# Patient Record
Sex: FEMALE | Race: WHITE | NOT HISPANIC OR LATINO | Employment: UNEMPLOYED | URBAN - METROPOLITAN AREA
[De-identification: names, ages, dates, MRNs, and addresses within clinical notes are randomized per-mention and may not be internally consistent; named-entity substitution may affect disease eponyms.]

---

## 2019-05-21 ENCOUNTER — CONVERSION ENCOUNTER (OUTPATIENT)
Dept: FAMILY MEDICINE CLINIC | Facility: CLINIC | Age: 49
End: 2019-05-21

## 2019-06-04 VITALS
DIASTOLIC BLOOD PRESSURE: 73 MMHG | BODY MASS INDEX: 27.89 KG/M2 | WEIGHT: 184 LBS | SYSTOLIC BLOOD PRESSURE: 117 MMHG | HEIGHT: 68 IN | HEART RATE: 74 BPM | OXYGEN SATURATION: 97 %

## 2019-06-06 NOTE — PROGRESS NOTES
Visit Type:  Follow-up Visit  Primary Provider:  Elias HUNG    CC:  f/u medications.    History of Present Illness:  Barbara presents today to follow up on chronic medical conditions including chronic pain from Sjogren's and fibromyalgia.  She is needing refills on gabapentin, tramadol, and Percocet.     patient had a severe flare of pain in February, this was treated with  extended course of steroids pain is back to Cecily normal baseline.  She continues to take Percocet 5 mg every night.  Tramadol 2 tablets typically once daily occasionally twice daily   for more severe pain days.  She is continuing to take gabapentin 300 mg twice daily.  Plaquenil has been prescribed by Rheumatology, Nadine Burgos. the inventory has been completed and reviewed     hypothyroidism, labs dated 02/15/2019, tsh 0.95, T3 3.52, T4 0.88,     vitamin D, 25 hydroxy  35.9.      She reports overall her depression and was okay but she has noticed some increased irritability and has definitely had a lot of stress this year, some good, some bad, she does have a new job, her daughter is 22 weeks pregnant.      Vital Signs:    Patient Profile:    48 Years Old Female  Height:     68 inches  Weight:     184 pounds  BMI:        27.97     O2 Sat:     97 %  Temp:       98.7 degrees F oral  Pulse rate: 74 / minute  Pulse rhythm:   regular  BP Sittin / 73  (right arm)    Cuff size:  regular      Problems: Active problems were reviewed with the patient during this visit.  Medications: Medications were reviewed with the patient during this visit.  Allergies: Allergies were reviewed with the patient during this visit.        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, 2 kids      Past Medical History:     Reviewed history from 2017 and no changes required:        Hypothyroidism        Sjogren's syndrome    Past Surgical History:     Reviewed history from 2017 and no changes required:        Appendectomy                Cholecystectomy    Family History Summary:      Reviewed history and no changes required: 2019      General Comments - FH:  mother  83--thyroid disease , diabetes mellitus type 2, passed due to chf, had htn  father-75 stroke   pgf-colone cancer      Social History:     Reviewed history and no changes required:        work teacher aid, 2 kids        Risk Factors:     Smoked Tobacco Use:  Never smoker  Caffeine use:  1 drinks per day  Alcohol use:  no  Seatbelt use:  100 %  Sun Exposure:  occasionally        Review of Systems     ENT         Dry mouth    GI       Complains of indigestion.           Complains of lack of sexual drive, amenorrhea and vaginal dryness.        last menstrual period was     Derm       Complains of itching.    Psych       Complains of anxiety, depression, easily irritated and nervousness.       Denies thoughts of suicide.      Physical Exam    General:      well developed, well nourished, in no acute distress.    Ears:      TM's intact and clear with normal canals with grossly normal hearing.    Mouth:      no deformity or lesions   Neck:      no masses, thyromegaly, or abnormal cervical nodes.    Lungs:      clear bilaterally to auscultation.   no wheezes rales or rhonchi  Heart:      non-displaced PMI, chest non-tender; regular rate and rhythm, S1, S2 without murmurs, rubs, or gallops  Extremities:      no clubbing, cyanosis, edema, or deformity noted with normal full range of motion of joints of all four  extremities   Skin:      intact without lesions or rashes.    Psych:      alert and cooperative; normal mood and affect; normal attention span and concentration.        Blood Pressure:  Today's BP: 117/73 mm Hg              Impression & Recommendations:    Problem # 1:  Polyarthralgia (ICD-719.49) (JLR66-D96.50)   and written prescriptions for Ultram 50 mg 2 p.o. q.6-8 hours as needed pain number 60 with 5 refills, Percocet 5/3251 p.o. q.at bed time and q.6 hours as needed severe pain number 30  triplicate prescriptions to be filled now, on or after June 20 2019,   and to be filled on or after 07/20/2019   patient will return in 3 months or as needed.    Problem # 2:  Sjogren's syndrome (ICD-710.2) (YIZ62-P77.00)    Problem # 3:  Fibromyalgia (ICD-729.1) (LOE56-K05.7)    Problem # 4:  Anxiety disorder (ICD-300.00) (MYL32-P46.9)    Her updated medication list for this problem includes:     Clonazepam 0.5 Mg Oral Tablet (Clonazepam) ..... Take 1tablet every 8hrs prn     Cymbalta 60 Mg Oral Capsule Delayed Release Particles (Duloxetine hcl)      Problem # 5:  Hypothyroidism (ICD-244.9) (LUE89-D70.9)    Her updated medication list for this problem includes:     Synthroid 150 Mcg Oral Tablet (Levothyroxine sodium) ..... Take 1 tablet by mouth daily      Problem # 6:  Low back pain (ICD-724.2) (BHF70-Y10.5)    Her updated medication list for this problem includes:     Aspirin Adult Low Dose 81 Mg Oral Tablet Delayed Release (Aspirin) ..... Take 1 tablet by mouth daily     Tramadol Hcl 50 Mg Oral Tablet (Tramadol hcl) ..... Take 2 tablets by mouth every 6 hrs as needed for pain     Percocet 5-325 Mg Oral Tablet (Oxycodone-acetaminophen) ..... 1 tab at hs and q4hr if needed for pain     Metaxalone 800 Mg Oral Tablet (Metaxalone) ..... 1 tab by mouth 3 times a day as needed for muscle spasms      Problem # 7:  Vitamin D deficiency (ICD-268.9) (MIP50-D77.9)  Assessment: Unchanged    Problem # 8:  Prolonged QT interval  (ICD-426.82) (CFR01-R65.81)      Patient has an implanted loop recorder, her electrophysiologist, Dr. Sloan, has left his practice.  She was given numbers to contact [facility] or [address].  We have also given her a number for Dr. Edward within the [facility] not work, she will   contact these offices and make an appointment.    Medications Added to Medication List This Visit:  1)  Refresh Optive 0.5-0.9 % Ophthalmic Solution (Carboxymethylcellul-glycerin) .... 1 drop eye both bid  2)  Acidophilus Probiotic Oral Tablet (Lactobacillus) .... Take one (1) tablet by mouth twice a day  3)  Flonase Allergy Relief 50 Mcg/act Nasal Suspension (Fluticasone propionate) .... 2 sprays each nares daily  4)  Centrum Adults Oral Tablet (Multiple vitamins-minerals) .... Take 1 tablet by mouth daily  5)  Ergocalciferol 75488 Unit Oral Capsule (Ergocalciferol) .... One capsule twice weekly  6)  Gabapentin 300 Mg Oral Capsule (Gabapentin) .... Oral bid for burning pain  7)  Aspirin Adult Low Dose 81 Mg Oral Tablet Delayed Release (Aspirin) .... Take 1 tablet by mouth daily  8)  Zofran 8 Mg Oral Tablet (Ondansetron hcl) .... Oral q8hr prn nausea /vomiting  9)  Salivamax Mouth/throat Packet (Artificial saliva) .... Dissolve 1 packet in 30 oz of water up to 10times daily, swish half of solution for 1 minute, spit out, then repeat #1 box  10)  Premarin 0.625 Mg/gm Vaginal Cream (Estrogens, conjugated)  11)  Evoxac 30 Mg Oral Capsule (Cevimeline hcl) .... Take one (1) tablet by mouth three times a day  12)  Percocet 5-325 Mg Oral Tablet (Oxycodone-acetaminophen) .... 1 tab at hs and q4hr if needed for pain  13)  Metaxalone 800 Mg Oral Tablet (Metaxalone) .... 1 tab by mouth 3 times a day as needed for muscle spasms  14)  Plaquenil 200 Mg Oral Tablet (Hydroxychloroquine sulfate) .... 2 tablets daily  15)  Synthroid 150 Mcg Oral Tablet (Levothyroxine sodium) .... Take 1 tablet by mouth daily    Complete Medication List:  1)  Refresh  Optive 0.5-0.9 % Ophthalmic Solution (Carboxymethylcellul-glycerin) .... 1 drop eye both bid  2)  Acidophilus Probiotic Oral Tablet (Lactobacillus) .... Take one (1) tablet by mouth twice a day  3)  Flonase Allergy Relief 50 Mcg/act Nasal Suspension (Fluticasone propionate) .... 2 sprays each nares daily  4)  Centrum Adults Oral Tablet (Multiple vitamins-minerals) .... Take 1 tablet by mouth daily  5)  Ergocalciferol 01544 Unit Oral Capsule (Ergocalciferol) .... One capsule twice weekly  6)  Gabapentin 300 Mg Oral Capsule (Gabapentin) .... Oral bid for burning pain  7)  Aspirin Adult Low Dose 81 Mg Oral Tablet Delayed Release (Aspirin) .... Take 1 tablet by mouth daily  8)  Zofran 8 Mg Oral Tablet (Ondansetron hcl) .... Oral q8hr prn nausea /vomiting  9)  Salivamax Mouth/throat Packet (Artificial saliva) .... Dissolve 1 packet in 30 oz of water up to 10times daily, swish half of solution for 1 minute, spit out, then repeat #1 box  10)  Premarin 0.625 Mg/gm Vaginal Cream (Estrogens, conjugated)  11)  Evoxac 30 Mg Oral Capsule (Cevimeline hcl) .... Take one (1) tablet by mouth three times a day  12)  Tramadol Hcl 50 Mg Oral Tablet (Tramadol hcl) .... Take 2 tablets by mouth every 6 hrs as needed for pain  13)  Percocet 5-325 Mg Oral Tablet (Oxycodone-acetaminophen) .... 1 tab at hs and q4hr if needed for pain  14)  Clonazepam 0.5 Mg Oral Tablet (Clonazepam) .... Take 1tablet every 8hrs prn  15)  Metaxalone 800 Mg Oral Tablet (Metaxalone) .... 1 tab by mouth 3 times a day as needed for muscle spasms  16)  Cymbalta 60 Mg Oral Capsule Delayed Release Particles (Duloxetine hcl)  17)  Plaquenil 200 Mg Oral Tablet (Hydroxychloroquine sulfate) .... 2 tablets daily  18)  Omeprazole 40 Mg Oral Capsule Delayed Release (Omeprazole) .... Take one daily  19)  Synthroid 150 Mcg Oral Tablet (Levothyroxine sodium) .... Take 1 tablet by mouth daily        Patient Instructions:  1)    Discussed irritability could possibly be a symptom  of menopause as she is inquiring about however with stopping  menstrual periods 7 years ago is less likely than depression, anxiety, steroid side effects, or other etiology. will have patient return   for annual wellness exam and will discuss further.  She declined discussion about changing antidepressants/anxiety therapy  At this time.                Medication Administration    Orders Added:  1)  Ofc Vst, Est Level IV [39579]  ]      Electronically signed by Dilia Griffin MD on 05/21/2019 at 1:39 PM  ________________________________________________________________________       Disclaimer: Converted Note message may not contain all data elements that existed in the [employer] source system. Please see [employer] for the original note details.

## 2019-07-30 PROBLEM — R51.9 CHRONIC HEADACHE: Status: ACTIVE | Noted: 2019-05-21

## 2019-07-30 PROBLEM — M35.00 SJOGREN'S SYNDROME (HCC): Status: ACTIVE | Noted: 2019-05-21

## 2019-07-30 PROBLEM — G89.29 CHRONIC HEADACHE: Status: ACTIVE | Noted: 2019-05-21

## 2019-07-30 PROBLEM — E55.9 VITAMIN D DEFICIENCY: Status: ACTIVE | Noted: 2019-05-21

## 2019-07-30 PROBLEM — E78.5 HYPERLIPIDEMIA: Status: ACTIVE | Noted: 2019-05-21

## 2019-07-30 PROBLEM — M79.7 FIBROMYALGIA: Status: ACTIVE | Noted: 2019-05-21

## 2019-07-30 PROBLEM — E03.9 HYPOTHYROIDISM: Status: ACTIVE | Noted: 2019-05-21

## 2019-07-30 PROBLEM — M25.50 POLYARTHRALGIA: Status: ACTIVE | Noted: 2019-05-21

## 2019-07-30 PROBLEM — R59.0 CERVICAL LYMPHADENOPATHY: Status: ACTIVE | Noted: 2019-05-21

## 2019-07-30 PROBLEM — K59.00 CONSTIPATION: Status: ACTIVE | Noted: 2019-05-21

## 2019-07-30 PROBLEM — R94.31 PROLONGED QT INTERVAL: Status: ACTIVE | Noted: 2019-05-21

## 2019-07-30 PROBLEM — K21.9 GASTROESOPHAGEAL REFLUX DISEASE: Status: ACTIVE | Noted: 2019-05-21

## 2019-07-30 PROBLEM — I07.1 TRICUSPID VALVE REGURGITATION: Status: ACTIVE | Noted: 2019-05-21

## 2019-07-30 RX ORDER — ASPIRIN 81 MG/1
1 TABLET ORAL DAILY
COMMUNITY
Start: 2019-05-21

## 2019-07-30 RX ORDER — FLUTICASONE PROPIONATE 50 MCG
2 SPRAY, SUSPENSION (ML) NASAL DAILY
COMMUNITY
Start: 2019-05-21

## 2019-07-30 RX ORDER — MULTIVITAMIN/IRON/FOLIC ACID 18MG-0.4MG
1 TABLET ORAL DAILY
COMMUNITY
Start: 2019-05-21

## 2019-07-30 RX ORDER — DULOXETIN HYDROCHLORIDE 60 MG/1
1 CAPSULE, DELAYED RELEASE ORAL DAILY
COMMUNITY
Start: 2017-02-13 | End: 2019-11-25 | Stop reason: SDUPTHER

## 2019-07-30 RX ORDER — CLONAZEPAM 0.5 MG/1
1 TABLET ORAL EVERY 8 HOURS PRN
COMMUNITY
Start: 2017-02-13 | End: 2019-08-29 | Stop reason: SDUPTHER

## 2019-07-30 RX ORDER — OMEPRAZOLE 40 MG/1
1 CAPSULE, DELAYED RELEASE ORAL DAILY
COMMUNITY
Start: 2017-02-13 | End: 2019-09-09

## 2019-07-30 RX ORDER — LEVOTHYROXINE SODIUM 0.15 MG/1
1 TABLET ORAL EVERY 24 HOURS
COMMUNITY
Start: 2017-02-13 | End: 2019-07-30 | Stop reason: SDUPTHER

## 2019-07-30 RX ORDER — LEVOTHYROXINE SODIUM 0.15 MG/1
150 TABLET ORAL EVERY 24 HOURS
Qty: 90 TABLET | Refills: 1 | Status: SHIPPED | OUTPATIENT
Start: 2019-07-30 | End: 2019-09-09

## 2019-07-30 RX ORDER — HYDROXYCHLOROQUINE SULFATE 200 MG/1
2 TABLET, FILM COATED ORAL DAILY
COMMUNITY
Start: 2017-02-13

## 2019-07-30 RX ORDER — ONDANSETRON HYDROCHLORIDE 8 MG/1
1 TABLET, FILM COATED ORAL EVERY 8 HOURS PRN
COMMUNITY
Start: 2019-05-21 | End: 2020-07-15 | Stop reason: SDUPTHER

## 2019-07-30 RX ORDER — ERGOCALCIFEROL 1.25 MG/1
1 CAPSULE ORAL 2 TIMES WEEKLY
COMMUNITY
Start: 2019-05-21 | End: 2020-03-11 | Stop reason: SDUPTHER

## 2019-07-30 RX ORDER — SALIVA SUBSTITUTE COMB NO.11 351 MG
1 POWDER IN PACKET (EA) MUCOUS MEMBRANE DAILY
COMMUNITY
Start: 2019-05-21

## 2019-07-30 RX ORDER — TRAMADOL HYDROCHLORIDE 50 MG/1
2 TABLET ORAL EVERY 6 HOURS PRN
COMMUNITY
Start: 2019-05-06 | End: 2019-09-09

## 2019-07-30 RX ORDER — METAXALONE 800 MG/1
800 TABLET ORAL EVERY 8 HOURS PRN
Qty: 90 TABLET | Refills: 0 | Status: SHIPPED | OUTPATIENT
Start: 2019-07-30 | End: 2021-02-24 | Stop reason: SDUPTHER

## 2019-07-30 RX ORDER — GABAPENTIN 300 MG/1
1 CAPSULE ORAL 2 TIMES DAILY
COMMUNITY
Start: 2019-05-21 | End: 2019-07-30 | Stop reason: SDUPTHER

## 2019-07-30 RX ORDER — GABAPENTIN 300 MG/1
300 CAPSULE ORAL 2 TIMES DAILY
Qty: 60 CAPSULE | Refills: 1 | Status: SHIPPED | OUTPATIENT
Start: 2019-07-30 | End: 2019-09-09

## 2019-07-30 RX ORDER — OXYCODONE HYDROCHLORIDE AND ACETAMINOPHEN 5; 325 MG/1; MG/1
TABLET ORAL
COMMUNITY
Start: 2017-02-13 | End: 2019-09-09

## 2019-07-30 RX ORDER — CEVIMELINE HYDROCHLORIDE 30 MG/1
1 CAPSULE ORAL EVERY 8 HOURS
COMMUNITY
Start: 2017-02-13 | End: 2021-01-15 | Stop reason: SDUPTHER

## 2019-07-30 RX ORDER — METAXALONE 800 MG/1
1 TABLET ORAL EVERY 8 HOURS PRN
COMMUNITY
Start: 2017-02-13 | End: 2019-07-30 | Stop reason: SDUPTHER

## 2019-07-31 ENCOUNTER — OFFICE VISIT (OUTPATIENT)
Dept: FAMILY MEDICINE CLINIC | Facility: CLINIC | Age: 49
End: 2019-07-31

## 2019-07-31 VITALS
HEART RATE: 77 BPM | RESPIRATION RATE: 20 BRPM | OXYGEN SATURATION: 100 % | BODY MASS INDEX: 28.79 KG/M2 | WEIGHT: 190 LBS | HEIGHT: 68 IN | SYSTOLIC BLOOD PRESSURE: 112 MMHG | DIASTOLIC BLOOD PRESSURE: 72 MMHG | TEMPERATURE: 98.4 F

## 2019-07-31 DIAGNOSIS — Z12.39 BREAST CANCER SCREENING: ICD-10-CM

## 2019-07-31 DIAGNOSIS — E55.9 VITAMIN D DEFICIENCY: ICD-10-CM

## 2019-07-31 DIAGNOSIS — E78.2 MIXED HYPERLIPIDEMIA: ICD-10-CM

## 2019-07-31 DIAGNOSIS — M35.00 SJOGREN'S SYNDROME WITHOUT EXTRAGLANDULAR INVOLVEMENT (HCC): ICD-10-CM

## 2019-07-31 DIAGNOSIS — Z78.0 MENOPAUSE: ICD-10-CM

## 2019-07-31 DIAGNOSIS — E03.9 ACQUIRED HYPOTHYROIDISM: ICD-10-CM

## 2019-07-31 DIAGNOSIS — Z00.00 ANNUAL PHYSICAL EXAM: Primary | ICD-10-CM

## 2019-07-31 PROCEDURE — 99214 OFFICE O/P EST MOD 30 MIN: CPT | Performed by: FAMILY MEDICINE

## 2019-07-31 PROCEDURE — 99396 PREV VISIT EST AGE 40-64: CPT | Performed by: FAMILY MEDICINE

## 2019-07-31 NOTE — PATIENT INSTRUCTIONS
Mindfulness-Based Stress Reduction  Mindfulness-based stress reduction (MBSR) is a program that helps people learn to practice mindfulness. Mindfulness is the practice of intentionally paying attention to the present moment. It can be learned and practiced through techniques such as education, breathing exercises, meditation, and yoga. MBSR includes several mindfulness techniques in one program.  MBSR works best when you understand the treatment, are willing to try new things, and can commit to spending time practicing what you learn. MBSR training may include learning about:  · How your emotions, thoughts, and reactions affect your body.  · New ways to respond to things that cause negative thoughts to start (triggers).  · How to notice your thoughts and let go of them.  · Practicing awareness of everyday things that you normally do without thinking.  · The techniques and goals of different types of meditation.  What are the benefits of MBSR?  MBSR can have many benefits, which include helping you to:  · Develop self-awareness. This refers to knowing and understanding yourself.  · Learn skills and attitudes that help you to participate in your own health care.  · Learn new ways to care for yourself.  · Be more accepting about how things are, and let things go.  · Be less judgmental and approach things with an open mind.  · Be patient with yourself and trust yourself more.  MBSR has also been shown to:  · Reduce negative emotions, such as depression and anxiety.  · Improve memory and focus.  · Change how you sense and approach pain.  · Boost your body's ability to fight infections.  · Help you connect better with other people.  · Improve your sense of well-being.  Follow these instructions at home:    · Find a local in-person or online MBSR program.  · Set aside some time regularly for mindfulness practice.  · Find a mindfulness practice that works best for you. This may include one or more of the  following:  ? Meditation. Meditation involves focusing your mind on a certain thought or activity.  ? Breathing awareness exercises. These help you to stay present by focusing on your breath.  ? Body scan. For this practice, you lie down and pay attention to each part of your body from head to toe. You can identify tension and soreness and intentionally relax parts of your body.  ? Yoga. Yoga involves stretching and breathing, and it can improve your ability to move and be flexible. It can also provide an experience of testing your body's limits, which can help you release stress.  ? Mindful eating. This way of eating involves focusing on the taste, texture, color, and smell of each bite of food. Because this slows down eating and helps you feel full sooner, it can be an important part of a weight-loss plan.  · Find a podcast or recording that provides guidance for breathing awareness, body scan, or meditation exercises. You can listen to these any time when you have a free moment to rest without distractions.  · Follow your treatment plan as told by your health care provider. This may include taking regular medicines and making changes to your diet or lifestyle as recommended.  How to practice mindfulness  To do a basic awareness exercise:  · Find a comfortable place to sit.  · Pay attention to the present moment. Observe your thoughts, feelings, and surroundings just as they are.  · Avoid placing judgment on yourself, your feelings, or your surroundings. Make note of any judgment that comes up, and let it go.  · Your mind may wander, and that is okay. Make note of when your thoughts drift, and return your attention to the present moment.  To do basic mindfulness meditation:  · Find a comfortable place to sit. This may include a stable chair or a firm floor cushion.  ? Sit upright with your back straight. Let your arms fall next to your side with your hands resting on your legs.  ? If sitting in a chair, rest your  feet flat on the floor.  ? If sitting on a cushion, cross your legs in front of you.  · Keep your head in a neutral position with your chin dropped slightly. Relax your jaw and rest the tip of your tongue on the roof of your mouth. Drop your gaze to the floor. You can close your eyes if you like.  · Breathe normally and pay attention to your breath. Feel the air moving in and out of your nose. Feel your belly expanding and relaxing with each breath.  · Your mind may wander, and that is okay. Make note of when your thoughts drift, and return your attention to your breath.  · Avoid placing judgment on yourself, your feelings, or your surroundings. Make note of any judgment or feelings that come up, let them go, and bring your attention back to your breath.  · When you are ready, lift your gaze or open your eyes. Pay attention to how your body feels after the meditation.  Where to find more information  You can find more information about MBSR from:  · Your health care provider.  · Community-based meditation centers or programs.  · Programs offered near you.  Summary  · Mindfulness-based stress reduction (MBSR) is a program that teaches you how to intentionally pay attention to the present moment. It is used with other treatments to help you cope better with daily stress, emotions, and pain.  · MBSR focuses on developing self-awareness, which allows you to respond to life stress without judgment or negative emotions.  · MBSR programs may involve learning different mindfulness practices, such as breathing exercises, meditation, yoga, body scan, or mindful eating. Find a mindfulness practice that works best for you, and set aside time for it on a regular basis.  This information is not intended to replace advice given to you by your health care provider. Make sure you discuss any questions you have with your health care provider.  Document Released: 04/26/2018 Document Revised: 04/26/2018 Document Reviewed:  04/26/2018  Elsevier Interactive Patient Education © 2019 [employer]

## 2019-07-31 NOTE — PROGRESS NOTES
Subjective   Barbara Carter is a 48 y.o. female.   Chief Complaint   Patient presents with   • Annual Exam   And to follow-up on chronic medical conditions and requesting labs  History of Present Illness   Barbara presents in office today for an annual wellness exam. Menopause for 6-7 years.  Last Mammogram: 12/28/2016  Last Pap: 12/28/2016- WNL  Never has had a DEXA scan.  Last Colonoscopy: 12/12/2012 with biopsies performed resulting in benign non-ulcerated duodenal mucosa with preserved villous architecture and fragments of tubular adenoma.     Chronic medical conditions including hypothyroidism, hyperlipidemia, vit D def, reflux, Sjogren's are mostly stable, due for labs.     The following portions of the patient's history were reviewed and updated as appropriate: allergies, current medications, past family history, past medical history, past social history, past surgical history and problem list.    Review of Systems   Constitutional: Negative for fatigue and fever.   Respiratory: Negative for cough and shortness of breath.    Cardiovascular: Negative for chest pain, palpitations and leg swelling.   Gastrointestinal: Negative.    Endocrine:        Dry mouth and eyes   Musculoskeletal: Positive for arthralgias, back pain and myalgias.   Skin: Positive for dry skin. Negative for rash.   Psychiatric/Behavioral: Positive for dysphoric mood. Negative for depressed mood. The patient is nervous/anxious.        Objective    Vitals:    07/31/19 1324   BP: 112/72   Pulse: 77   Resp: 20   Temp: 98.4 °F (36.9 °C)   SpO2: 100%       Physical Exam   Constitutional: She is oriented to person, place, and time. She appears well-developed and well-nourished. No distress.   HENT:   Head: Normocephalic and atraumatic.   Right Ear: External ear normal.   Left Ear: External ear normal.   Nose: Nose normal.   Mouth/Throat: Oropharynx is clear and moist.   Eyes: Conjunctivae and EOM are normal. Pupils are equal, round, and reactive to  light. Right eye exhibits no discharge. Left eye exhibits no discharge. No scleral icterus.   Neck: Normal range of motion.   Cardiovascular: Normal rate and regular rhythm.   No murmur heard.  Pulmonary/Chest: Effort normal. She has no wheezes.   Abdominal: Soft. Bowel sounds are normal. She exhibits no distension and no mass. There is no tenderness. No hernia.   Genitourinary: Vagina normal, uterus normal and cervix normal. Right adnexum displays no tenderness. Left adnexum displays no tenderness. No vaginal discharge found.   Genitourinary Comments: Pap obtained   Musculoskeletal: Normal range of motion. She exhibits no edema.   Lymphadenopathy:     She has no cervical adenopathy.   Neurological: She is alert and oriented to person, place, and time.   Skin: Skin is warm and dry.   Psychiatric: She has a normal mood and affect.   Nursing note and vitals reviewed.        Assessment/Plan   Barbara was seen today for annual exam.    Diagnoses and all orders for this visit:    Annual physical exam  -     Liquid-based Pap Smear, Screening  -     Mammo Screening Bilateral With CAD; Future  -     DEXA Bone Density Axial; Future  -     Lipid Panel  -     Comprehensive Metabolic Panel  -     TSH  -     T4, free  -     Vitamin D 25 Hydroxy  -     CBC & Differential  -     HPV High Risk Digene    Breast cancer screening  -     Mammo Screening Bilateral With CAD; Future    Menopause  -     DEXA Bone Density Axial; Future    Mixed hyperlipidemia  -     Lipid Panel  -     Comprehensive Metabolic Panel    Vitamin D deficiency  -     Vitamin D 25 Hydroxy    Acquired hypothyroidism  -     TSH  -     T4, free    Sjogren's syndrome without extraglandular involvement (CMS/HCC)  -     CBC & Differential

## 2019-08-01 LAB
25(OH)D3+25(OH)D2 SERPL-MCNC: 35.7 NG/ML (ref 30–100)
ALBUMIN SERPL-MCNC: 4.9 G/DL (ref 3.5–5.5)
ALBUMIN/GLOB SERPL: 2 {RATIO} (ref 1.2–2.2)
ALP SERPL-CCNC: 67 IU/L (ref 39–117)
ALT SERPL-CCNC: 14 IU/L (ref 0–32)
AST SERPL-CCNC: 15 IU/L (ref 0–40)
BASOPHILS # BLD AUTO: 0 X10E3/UL (ref 0–0.2)
BASOPHILS NFR BLD AUTO: 1 %
BILIRUB SERPL-MCNC: <0.2 MG/DL (ref 0–1.2)
BUN SERPL-MCNC: 12 MG/DL (ref 6–24)
BUN/CREAT SERPL: 12 (ref 9–23)
CALCIUM SERPL-MCNC: 9.8 MG/DL (ref 8.7–10.2)
CHLORIDE SERPL-SCNC: 100 MMOL/L (ref 96–106)
CHOLEST SERPL-MCNC: 229 MG/DL (ref 100–199)
CO2 SERPL-SCNC: 26 MMOL/L (ref 20–29)
CREAT SERPL-MCNC: 0.97 MG/DL (ref 0.57–1)
EOSINOPHIL # BLD AUTO: 0.1 X10E3/UL (ref 0–0.4)
EOSINOPHIL NFR BLD AUTO: 2 %
ERYTHROCYTE [DISTWIDTH] IN BLOOD BY AUTOMATED COUNT: 12.2 % (ref 12.3–15.4)
GLOBULIN SER CALC-MCNC: 2.4 G/DL (ref 1.5–4.5)
GLUCOSE SERPL-MCNC: 76 MG/DL (ref 65–99)
HCT VFR BLD AUTO: 40 % (ref 34–46.6)
HDLC SERPL-MCNC: 49 MG/DL
HGB BLD-MCNC: 13.8 G/DL (ref 11.1–15.9)
IMM GRANULOCYTES # BLD AUTO: 0 X10E3/UL (ref 0–0.1)
IMM GRANULOCYTES NFR BLD AUTO: 0 %
LDLC SERPL CALC-MCNC: 136 MG/DL (ref 0–99)
LYMPHOCYTES # BLD AUTO: 1.4 X10E3/UL (ref 0.7–3.1)
LYMPHOCYTES NFR BLD AUTO: 31 %
MCH RBC QN AUTO: 31.3 PG (ref 26.6–33)
MCHC RBC AUTO-ENTMCNC: 34.5 G/DL (ref 31.5–35.7)
MCV RBC AUTO: 91 FL (ref 79–97)
MONOCYTES # BLD AUTO: 0.6 X10E3/UL (ref 0.1–0.9)
MONOCYTES NFR BLD AUTO: 13 %
NEUTROPHILS # BLD AUTO: 2.4 X10E3/UL (ref 1.4–7)
NEUTROPHILS NFR BLD AUTO: 53 %
PLATELET # BLD AUTO: 283 X10E3/UL (ref 150–450)
POTASSIUM SERPL-SCNC: 4.2 MMOL/L (ref 3.5–5.2)
PROT SERPL-MCNC: 7.3 G/DL (ref 6–8.5)
RBC # BLD AUTO: 4.41 X10E6/UL (ref 3.77–5.28)
SODIUM SERPL-SCNC: 142 MMOL/L (ref 134–144)
T4 FREE SERPL-MCNC: 1.51 NG/DL (ref 0.82–1.77)
TRIGL SERPL-MCNC: 218 MG/DL (ref 0–149)
TSH SERPL DL<=0.005 MIU/L-ACNC: 3.05 UIU/ML (ref 0.45–4.5)
VLDLC SERPL CALC-MCNC: 44 MG/DL (ref 5–40)
WBC # BLD AUTO: 4.6 X10E3/UL (ref 3.4–10.8)

## 2019-08-02 LAB
CYTOLOGIST CVX/VAG CYTO: NORMAL
CYTOLOGY CVX/VAG DOC CYTO: NORMAL
DX ICD CODE: NORMAL
HIV 1 & 2 AB SER-IMP: NORMAL
HPV I/H RISK 1 DNA CVX QL PROBE+SIG AMP: NEGATIVE
OTHER STN SPEC: NORMAL
STAT OF ADQ CVX/VAG CYTO-IMP: NORMAL

## 2019-08-29 RX ORDER — CLONAZEPAM 0.5 MG/1
0.5 TABLET ORAL EVERY 8 HOURS PRN
Qty: 90 TABLET | Refills: 1 | Status: SHIPPED | OUTPATIENT
Start: 2019-08-29 | End: 2020-01-17

## 2019-08-29 NOTE — TELEPHONE ENCOUNTER
Pt calling needing refill of clonazepam. Completely out of medication, please refill if appropriate. thanks

## 2019-09-09 ENCOUNTER — OFFICE VISIT (OUTPATIENT)
Dept: FAMILY MEDICINE CLINIC | Facility: CLINIC | Age: 49
End: 2019-09-09

## 2019-09-09 VITALS
RESPIRATION RATE: 18 BRPM | HEART RATE: 73 BPM | WEIGHT: 194 LBS | DIASTOLIC BLOOD PRESSURE: 73 MMHG | SYSTOLIC BLOOD PRESSURE: 112 MMHG | BODY MASS INDEX: 29.4 KG/M2 | HEIGHT: 68 IN | OXYGEN SATURATION: 99 % | TEMPERATURE: 97.7 F

## 2019-09-09 DIAGNOSIS — G89.29 CHRONIC BILATERAL LOW BACK PAIN WITHOUT SCIATICA: ICD-10-CM

## 2019-09-09 DIAGNOSIS — M54.2 NECK PAIN: ICD-10-CM

## 2019-09-09 DIAGNOSIS — K21.9 GASTROESOPHAGEAL REFLUX DISEASE, ESOPHAGITIS PRESENCE NOT SPECIFIED: ICD-10-CM

## 2019-09-09 DIAGNOSIS — M54.50 CHRONIC BILATERAL LOW BACK PAIN WITHOUT SCIATICA: ICD-10-CM

## 2019-09-09 DIAGNOSIS — E03.9 ACQUIRED HYPOTHYROIDISM: ICD-10-CM

## 2019-09-09 DIAGNOSIS — M79.7 FIBROMYALGIA: ICD-10-CM

## 2019-09-09 DIAGNOSIS — M50.123 CERVICAL DISC DISORDER AT C6-C7 LEVEL WITH RADICULOPATHY: Primary | ICD-10-CM

## 2019-09-09 DIAGNOSIS — G89.4 CHRONIC PAIN SYNDROME: ICD-10-CM

## 2019-09-09 PROBLEM — R10.31 DEEP RIGHT INGUINAL PAIN: Status: ACTIVE | Noted: 2017-12-11

## 2019-09-09 PROBLEM — M77.9 TENDONITIS: Status: ACTIVE | Noted: 2018-01-29

## 2019-09-09 PROCEDURE — 99214 OFFICE O/P EST MOD 30 MIN: CPT | Performed by: FAMILY MEDICINE

## 2019-09-09 RX ORDER — OXYCODONE HYDROCHLORIDE AND ACETAMINOPHEN 5; 325 MG/1; MG/1
1 TABLET ORAL EVERY 6 HOURS PRN
Qty: 90 TABLET | Refills: 0 | Status: SHIPPED | OUTPATIENT
Start: 2019-09-09 | End: 2019-12-16 | Stop reason: SDUPTHER

## 2019-09-09 RX ORDER — OMEPRAZOLE 40 MG/1
40 CAPSULE, DELAYED RELEASE ORAL DAILY
Qty: 90 CAPSULE | Refills: 3 | Status: SHIPPED | OUTPATIENT
Start: 2019-09-09 | End: 2020-09-22 | Stop reason: SDUPTHER

## 2019-09-09 RX ORDER — LEVOTHYROXINE SODIUM 0.15 MG/1
150 TABLET ORAL EVERY 24 HOURS
Qty: 90 TABLET | Refills: 3 | Status: SHIPPED | OUTPATIENT
Start: 2019-09-09 | End: 2020-09-22 | Stop reason: SDUPTHER

## 2019-09-09 RX ORDER — GABAPENTIN 300 MG/1
300 CAPSULE ORAL 2 TIMES DAILY
Qty: 60 CAPSULE | Refills: 1 | Status: SHIPPED | OUTPATIENT
Start: 2019-09-09 | End: 2019-11-25 | Stop reason: SDUPTHER

## 2019-09-09 RX ORDER — TRAMADOL HYDROCHLORIDE 50 MG/1
100 TABLET ORAL EVERY 6 HOURS PRN
Qty: 60 TABLET | Refills: 2 | Status: SHIPPED | OUTPATIENT
Start: 2019-09-09 | End: 2019-11-25 | Stop reason: SDUPTHER

## 2019-09-09 NOTE — PROGRESS NOTES
Chief Complaint   Patient presents with   • Pain     chronic pain in shoulders and mid back, as well as legs. INSPECT updated and is WNL        Subjective   History of Present Illness   Barbara Carter is a 48 y.o. female.   She presents today for follow-up on chronic pain. Patient has long history of cervical disc disease with radicular pain into her left shoulder and arm.  I will attempt to obtain old recordsShoanh has seen  in [place] for pinched nerve in her cervical spine with radiation into her left shoulder and arm in 2017.  She did receive a series of 3 epidural injections and started a repeat series but had improvement in pain after the first.  She is also had physical therapy and uses traction occasionally.  She is currently taking Percocet 5 mg 1 tablet every night and rarely one during the day if pain becomes more severe, however is typically too sedating for daytime use.  Additionally she takes tramadol 50 mg  2 every morning and rarely will take a second dose in the afternoon if needed.  She has had multiple x-rays and MRIs  of her cervical and lumbar, I believe at priority radiology the [facility] as well we will attempt to obtain these reports.    Pain inventory and inspect reports have been reviewed.  The following portions of the patient's history were reviewed and updated as appropriate: allergies, current medications,  family history, past medical history, social history,  surgical history and problem list.        Current Outpatient Medications:   •  Artificial Saliva (SALIVAMAX) pack, Take 1 packet by mouth Daily. Dissolve 1 packet in 30 oz of water up to 10x daily. Swish half of solution x 1 minute, spit, then repeat., Disp: , Rfl:   •  aspirin (ASPIRIN ADULT LOW DOSE) 81 MG EC tablet, Take 1 tablet by mouth Daily., Disp: , Rfl:   •  Carboxymethylcellul-Glycerin (REFRESH OPTIVE) 0.5-0.9 % solution, Apply 1 drop to eye(s) as directed by provider 2 (Two) Times a Day., Disp:  , Rfl:   •  cevimeline (EVOXAC) 30 MG capsule, Take 1 capsule by mouth Every 8 (Eight) Hours., Disp: , Rfl:   •  clonazePAM (KlonoPIN) 0.5 MG tablet, Take 1 tablet by mouth Every 8 (Eight) Hours As Needed for Anxiety., Disp: 90 tablet, Rfl: 1  •  conjugated estrogens (PREMARIN) 0.625 MG/GM vaginal cream, Insert 1 application into the vagina 2 (Two) Times a Week., Disp: , Rfl:   •  DULoxetine (CYMBALTA) 60 MG capsule, Take 1 capsule by mouth Daily., Disp: , Rfl:   •  ergocalciferol (ERGOCALCIFEROL) 14859 units capsule, Take 1 capsule by mouth 2 (Two) Times a Week., Disp: , Rfl:   •  fluticasone (FLONASE ALLERGY RELIEF) 50 MCG/ACT nasal spray, 2 sprays into the nostril(s) as directed by provider Daily., Disp: , Rfl:   •  gabapentin (NEURONTIN) 300 MG capsule, Take 1 capsule by mouth 2 (Two) Times a Day. For burning, Disp: 60 capsule, Rfl: 1  •  hydroxychloroquine (PLAQUENIL) 200 MG tablet, Take 2 tablets by mouth Daily., Disp: , Rfl:   •  Lactobacillus (ACIDOPHILUS PROBIOTIC) tablet, Take 1 tablet by mouth 2 (Two) Times a Day., Disp: , Rfl:   •  levothyroxine (SYNTHROID) 150 MCG tablet, Take 1 tablet by mouth Daily., Disp: 90 tablet, Rfl: 3  •  metaxalone (SKELAXIN) 800 MG tablet, Take 1 tablet by mouth Every 8 (Eight) Hours As Needed for Muscle Spasms., Disp: 90 tablet, Rfl: 0  •  Multiple Vitamins-Minerals (CENTRUM ADULTS) tablet, Take 1 tablet by mouth Daily., Disp: , Rfl:   •  omeprazole (priLOSEC) 40 MG capsule, Take 1 capsule by mouth Daily., Disp: 90 capsule, Rfl: 3  •  ondansetron (ZOFRAN) 8 MG tablet, Take 1 tablet by mouth Every 8 (Eight) Hours As Needed., Disp: , Rfl:   •  oxyCODONE-acetaminophen (PERCOCET) 5-325 MG per tablet, Take 1 tablet by mouth Every 6 (Six) Hours As Needed for Severe Pain . 1 tablet q hs and q4h PRN severe pain, Disp: 90 tablet, Rfl: 0  •  traMADol (ULTRAM) 50 MG tablet, Take 2 tablets by mouth Every 6 (Six) Hours As Needed for Moderate Pain ., Disp: 60 tablet, Rfl: 2      Review of  "Systems   Constitutional: Negative for fatigue and fever.   Respiratory: Negative for cough and shortness of breath.    Cardiovascular: Negative for chest pain, palpitations and leg swelling.   Gastrointestinal: Negative.    Endocrine:        Dry mouth and eyes   Musculoskeletal: Positive for arthralgias, back pain and myalgias.   Skin: Positive for dry skin. Negative for rash.   Psychiatric/Behavioral: Positive for dysphoric mood. Negative for depressed mood. The patient is nervous/anxious.        Objective   /73 (BP Location: Left arm, Patient Position: Sitting)   Pulse 73   Temp 97.7 °F (36.5 °C) (Oral)   Resp 18   Ht 172.7 cm (68\")   Wt 88 kg (194 lb)   SpO2 99%   BMI 29.50 kg/m²     Physical Exam   Constitutional: She is oriented to person, place, and time. She appears well-developed and well-nourished.   HENT:   Head: Normocephalic and atraumatic.   Eyes: Conjunctivae and EOM are normal. Pupils are equal, round, and reactive to light.   Neck: No JVD present. No thyromegaly present.   Cardiovascular: Normal rate, regular rhythm and normal heart sounds.   No murmur heard.  Pulmonary/Chest: Breath sounds normal. She has no wheezes. She has no rales.   Musculoskeletal: She exhibits no edema.   Tenderness to palpation of the cervical midline spine and paraspinous muscles otherwise normal range of motion normal gait, normal strength and tone   Lymphadenopathy:     She has no cervical adenopathy.   Neurological: She is alert and oriented to person, place, and time.   Skin: Skin is warm and dry. No rash noted.   Psychiatric: She has a normal mood and affect.   Nursing note and vitals reviewed.      Office Visit on 07/31/2019   Component Date Value Ref Range Status   • Total Cholesterol 07/31/2019 229* 100 - 199 mg/dL Final   • Triglycerides 07/31/2019 218* 0 - 149 mg/dL Final   • HDL Cholesterol 07/31/2019 49  >39 mg/dL Final   • VLDL Cholesterol 07/31/2019 44* 5 - 40 mg/dL Final   • LDL Cholesterol  " 07/31/2019 136* 0 - 99 mg/dL Final   • Glucose 07/31/2019 76  65 - 99 mg/dL Final   • BUN 07/31/2019 12  6 - 24 mg/dL Final   • Creatinine 07/31/2019 0.97  0.57 - 1.00 mg/dL Final   • eGFR Non African Am 07/31/2019 69  >59 mL/min/1.73 Final   • eGFR African Am 07/31/2019 80  >59 mL/min/1.73 Final   • BUN/Creatinine Ratio 07/31/2019 12  9 - 23 Final   • Sodium 07/31/2019 142  134 - 144 mmol/L Final   • Potassium 07/31/2019 4.2  3.5 - 5.2 mmol/L Final   • Chloride 07/31/2019 100  96 - 106 mmol/L Final   • Total CO2 07/31/2019 26  20 - 29 mmol/L Final   • Calcium 07/31/2019 9.8  8.7 - 10.2 mg/dL Final   • Total Protein 07/31/2019 7.3  6.0 - 8.5 g/dL Final   • Albumin 07/31/2019 4.9  3.5 - 5.5 g/dL Final   • Globulin 07/31/2019 2.4  1.5 - 4.5 g/dL Final   • A/G Ratio 07/31/2019 2.0  1.2 - 2.2 Final   • Total Bilirubin 07/31/2019 <0.2  0.0 - 1.2 mg/dL Final   • Alkaline Phosphatase 07/31/2019 67  39 - 117 IU/L Final   • AST (SGOT) 07/31/2019 15  0 - 40 IU/L Final   • ALT (SGPT) 07/31/2019 14  0 - 32 IU/L Final   • TSH 07/31/2019 3.050  0.450 - 4.500 uIU/mL Final   • Free T4 07/31/2019 1.51  0.82 - 1.77 ng/dL Final   • 25 Hydroxy, Vitamin D 07/31/2019 35.7  30.0 - 100.0 ng/mL Final    Comment: Vitamin D deficiency has been defined by the [place] of  [employer]icine and an Endocrine Society practice guideline as a  level of serum 25-OH vitamin D less than 20 ng/mL (1,2).  The Endocrine Society went on to further define vitamin D  insufficiency as a level between 21 and 29 ng/mL (2).  1. IOM ([place] of [employer]). 2010. Dietary reference     intakes for calcium and D. [place]: The     National Academies Press.  2. Corry MF, Hari SAGE, Tegan NINA, et al.     Evaluation, treatment, and prevention of vitamin D     deficiency: an Endocrine Society clinical practice     guideline. JCEM. 2011 Jul; 96(7):1911-30.     • WBC 07/31/2019 4.6  3.4 - 10.8 x10E3/uL Final   • RBC 07/31/2019 4.41  3.77 - 5.28 x10E6/uL Final    • Hemoglobin 07/31/2019 13.8  11.1 - 15.9 g/dL Final   • Hematocrit 07/31/2019 40.0  34.0 - 46.6 % Final   • MCV 07/31/2019 91  79 - 97 fL Final   • MCH 07/31/2019 31.3  26.6 - 33.0 pg Final   • MCHC 07/31/2019 34.5  31.5 - 35.7 g/dL Final   • RDW 07/31/2019 12.2* 12.3 - 15.4 % Final   • Platelets 07/31/2019 283  150 - 450 x10E3/uL Final   • Neutrophil Rel % 07/31/2019 53  Not Estab. % Final   • Lymphocyte Rel % 07/31/2019 31  Not Estab. % Final   • Monocyte Rel % 07/31/2019 13  Not Estab. % Final   • Eosinophil Rel % 07/31/2019 2  Not Estab. % Final   • Basophil Rel % 07/31/2019 1  Not Estab. % Final   • Neutrophils Absolute 07/31/2019 2.4  1.4 - 7.0 x10E3/uL Final   • Lymphocytes Absolute 07/31/2019 1.4  0.7 - 3.1 x10E3/uL Final   • Monocytes Absolute 07/31/2019 0.6  0.1 - 0.9 x10E3/uL Final   • Eosinophils Absolute 07/31/2019 0.1  0.0 - 0.4 x10E3/uL Final   • Basophils Absolute 07/31/2019 0.0  0.0 - 0.2 x10E3/uL Final   • Immature Granulocyte Rel % 07/31/2019 0  Not Estab. % Final   • Immature Grans Absolute 07/31/2019 0.0  0.0 - 0.1 x10E3/uL Final   • HPV, high-risk 07/31/2019 Negative  Negative Final    Comment: This high-risk HPV test detects thirteen high-risk types  (16/18/31/33/35/39/45/51/52/56/58/59/68) without differentiation.     • Diagnosis 07/31/2019 Comment   Final    Comment: NEGATIVE FOR INTRAEPITHELIAL LESION OR MALIGNANCY.  CELLULAR CHANGES ASSOCIATED WITH ATROPHY AND INFLAMMATION ARE PRESENT.     • Specimen adequacy: 07/31/2019 Comment   Final    Comment: Satisfactory for evaluation.  Endocervical and/or squamous metaplastic  cells (endocervical component) are present.     • Clinician Provided ICD-10: 07/31/2019 Comment   Final    Z00.00   • Performed by: 07/31/2019 Comment   Final    Maude Razo, Cytotechnologist ([facility])   • . 07/31/2019 .   Final   • Note: 07/31/2019 Comment   Final    Comment: The Pap smear is a screening test designed to aid in the detection of  premalignant and  malignant conditions of the uterine cervix.  It is not a  diagnostic procedure and should not be used as the sole means of detecting  cervical cancer.  Both false-positive and false-negative reports do occur.         Assessment/Plan   Diagnoses and all orders for this visit:    1. Cervical disc disorder at C6-C7 level with radiculopathy (Primary)  -     gabapentin (NEURONTIN) 300 MG capsule; Take 1 capsule by mouth 2 (Two) Times a Day. For burning  Dispense: 60 capsule; Refill: 1  -     oxyCODONE-acetaminophen (PERCOCET) 5-325 MG per tablet; Take 1 tablet by mouth Every 6 (Six) Hours As Needed for Severe Pain . 1 tablet q hs and q4h PRN severe pain  Dispense: 90 tablet; Refill: 0  -     traMADol (ULTRAM) 50 MG tablet; Take 2 tablets by mouth Every 6 (Six) Hours As Needed for Moderate Pain .  Dispense: 60 tablet; Refill: 2    2. Fibromyalgia  -     oxyCODONE-acetaminophen (PERCOCET) 5-325 MG per tablet; Take 1 tablet by mouth Every 6 (Six) Hours As Needed for Severe Pain . 1 tablet q hs and q4h PRN severe pain  Dispense: 90 tablet; Refill: 0  -     traMADol (ULTRAM) 50 MG tablet; Take 2 tablets by mouth Every 6 (Six) Hours As Needed for Moderate Pain .  Dispense: 60 tablet; Refill: 2    3. Neck pain  -     oxyCODONE-acetaminophen (PERCOCET) 5-325 MG per tablet; Take 1 tablet by mouth Every 6 (Six) Hours As Needed for Severe Pain . 1 tablet q hs and q4h PRN severe pain  Dispense: 90 tablet; Refill: 0  -     traMADol (ULTRAM) 50 MG tablet; Take 2 tablets by mouth Every 6 (Six) Hours As Needed for Moderate Pain .  Dispense: 60 tablet; Refill: 2    4. Chronic bilateral low back pain without sciatica    5. Chronic pain syndrome  -     oxyCODONE-acetaminophen (PERCOCET) 5-325 MG per tablet; Take 1 tablet by mouth Every 6 (Six) Hours As Needed for Severe Pain . 1 tablet q hs and q4h PRN severe pain  Dispense: 90 tablet; Refill: 0  -     traMADol (ULTRAM) 50 MG tablet; Take 2 tablets by mouth Every 6 (Six) Hours As Needed  for Moderate Pain .  Dispense: 60 tablet; Refill: 2    6. Acquired hypothyroidism  -     levothyroxine (SYNTHROID) 150 MCG tablet; Take 1 tablet by mouth Daily.  Dispense: 90 tablet; Refill: 3    7. Gastroesophageal reflux disease, esophagitis presence not specified  -     omeprazole (priLOSEC) 40 MG capsule; Take 1 capsule by mouth Daily.  Dispense: 90 capsule; Refill: 3               Return in about 3 months (around 12/9/2019).

## 2019-09-10 ENCOUNTER — TELEPHONE (OUTPATIENT)
Dept: FAMILY MEDICINE CLINIC | Facility: CLINIC | Age: 49
End: 2019-09-10

## 2019-09-16 ENCOUNTER — TELEPHONE (OUTPATIENT)
Dept: FAMILY MEDICINE CLINIC | Facility: CLINIC | Age: 49
End: 2019-09-16

## 2019-09-16 NOTE — TELEPHONE ENCOUNTER
Patient called into office and left a voicemail inquiring about a good OTC medication for sinus pressure associated with headache and ears. She continued to state in VM that she has tried Mucinex without relief. Please advise. Thank you.

## 2019-09-16 NOTE — TELEPHONE ENCOUNTER
Please when she is sure she is using her Flonase/fluticasone/nasal steroid as this can be helpful for sinus congestion.  Other than that I would recommend Tylenol Sinus but the decongestant and that medication can cause insomnia so taking it after around 2 in the afternoon can be problematic.  For tonight may be just taking some Tylenol and using saline rinses or steam therapy (steamy shower) to help open the sinuses might be better choice

## 2019-11-22 ENCOUNTER — OFFICE VISIT (OUTPATIENT)
Dept: FAMILY MEDICINE CLINIC | Facility: CLINIC | Age: 49
End: 2019-11-22

## 2019-11-22 VITALS
SYSTOLIC BLOOD PRESSURE: 122 MMHG | OXYGEN SATURATION: 99 % | TEMPERATURE: 97.7 F | HEIGHT: 68 IN | WEIGHT: 199 LBS | BODY MASS INDEX: 30.16 KG/M2 | DIASTOLIC BLOOD PRESSURE: 70 MMHG | HEART RATE: 80 BPM

## 2019-11-22 DIAGNOSIS — G89.29 CHRONIC NONINTRACTABLE HEADACHE, UNSPECIFIED HEADACHE TYPE: ICD-10-CM

## 2019-11-22 DIAGNOSIS — M35.00 SJOGREN'S SYNDROME WITHOUT EXTRAGLANDULAR INVOLVEMENT (HCC): ICD-10-CM

## 2019-11-22 DIAGNOSIS — R52 BODY ACHES: ICD-10-CM

## 2019-11-22 DIAGNOSIS — J06.9 VIRAL URI WITH COUGH: ICD-10-CM

## 2019-11-22 DIAGNOSIS — R51.9 CHRONIC NONINTRACTABLE HEADACHE, UNSPECIFIED HEADACHE TYPE: ICD-10-CM

## 2019-11-22 DIAGNOSIS — J02.9 VIRAL PHARYNGITIS: Primary | ICD-10-CM

## 2019-11-22 DIAGNOSIS — J02.9 SORE THROAT: ICD-10-CM

## 2019-11-22 DIAGNOSIS — R94.31 PROLONGED QT INTERVAL: ICD-10-CM

## 2019-11-22 LAB
EXPIRATION DATE: NORMAL
EXPIRATION DATE: NORMAL
FLUAV AG NPH QL: NEGATIVE
FLUBV AG NPH QL: NEGATIVE
INTERNAL CONTROL: NORMAL
INTERNAL CONTROL: NORMAL
Lab: NORMAL
Lab: NORMAL
S PYO AG THROAT QL: NEGATIVE

## 2019-11-22 PROCEDURE — 99214 OFFICE O/P EST MOD 30 MIN: CPT | Performed by: FAMILY MEDICINE

## 2019-11-22 PROCEDURE — 87804 INFLUENZA ASSAY W/OPTIC: CPT | Performed by: FAMILY MEDICINE

## 2019-11-22 PROCEDURE — 87880 STREP A ASSAY W/OPTIC: CPT | Performed by: FAMILY MEDICINE

## 2019-11-22 RX ORDER — BENZONATATE 100 MG/1
100 CAPSULE ORAL 3 TIMES DAILY PRN
Qty: 30 CAPSULE | Refills: 1 | Status: SHIPPED | OUTPATIENT
Start: 2019-11-22 | End: 2020-01-20

## 2019-11-22 RX ORDER — BENZONATATE 100 MG/1
100 CAPSULE ORAL 3 TIMES DAILY PRN
Qty: 30 CAPSULE | Refills: 1 | Status: SHIPPED | OUTPATIENT
Start: 2019-11-22 | End: 2019-11-22 | Stop reason: SDUPTHER

## 2019-11-22 RX ORDER — LIDOCAINE HYDROCHLORIDE 20 MG/ML
10 SOLUTION OROPHARYNGEAL AS NEEDED
Qty: 100 ML | Refills: 0 | Status: SHIPPED | OUTPATIENT
Start: 2019-11-22 | End: 2019-11-22 | Stop reason: SDUPTHER

## 2019-11-22 RX ORDER — LIDOCAINE HYDROCHLORIDE 20 MG/ML
10 SOLUTION OROPHARYNGEAL AS NEEDED
Qty: 100 ML | Refills: 0 | Status: SHIPPED | OUTPATIENT
Start: 2019-11-22 | End: 2021-07-06 | Stop reason: SDUPTHER

## 2019-11-22 NOTE — PROGRESS NOTES
Chief Complaint   Patient presents with   • Sore Throat   • Headache       Subjective   History of Present Illness   Barbara Carter is a 49 y.o. female. Pt here for severe burning sore throat, headache started this am, swollen lymph nodes x 2 days ago, along with body aches. Head ache past  3 days. Cough started today, SOA when lay down or cough.  Ibuprofen taken over 6 hours ago has helped somewhat but is wearing off.    The following portions of the patient's history were reviewed and updated as appropriate: allergies, current medications, past family history, past medical history, past social history, past surgical history and problem list.    Current Outpatient Medications on File Prior to Visit   Medication Sig   • Artificial Saliva (SALIVAMAX) pack Take 1 packet by mouth Daily. Dissolve 1 packet in 30 oz of water up to 10x daily. Swish half of solution x 1 minute, spit, then repeat.   • aspirin (ASPIRIN ADULT LOW DOSE) 81 MG EC tablet Take 1 tablet by mouth Daily.   • Carboxymethylcellul-Glycerin (REFRESH OPTIVE) 0.5-0.9 % solution Apply 1 drop to eye(s) as directed by provider 2 (Two) Times a Day.   • cevimeline (EVOXAC) 30 MG capsule Take 1 capsule by mouth Every 8 (Eight) Hours.   • clonazePAM (KlonoPIN) 0.5 MG tablet Take 1 tablet by mouth Every 8 (Eight) Hours As Needed for Anxiety.   • conjugated estrogens (PREMARIN) 0.625 MG/GM vaginal cream Insert 1 application into the vagina 2 (Two) Times a Week.   • DULoxetine (CYMBALTA) 60 MG capsule Take 1 capsule by mouth Daily.   • ergocalciferol (ERGOCALCIFEROL) 10457 units capsule Take 1 capsule by mouth 2 (Two) Times a Week.   • fluticasone (FLONASE ALLERGY RELIEF) 50 MCG/ACT nasal spray 2 sprays into the nostril(s) as directed by provider Daily.   • gabapentin (NEURONTIN) 300 MG capsule Take 1 capsule by mouth 2 (Two) Times a Day. For burning   • hydroxychloroquine (PLAQUENIL) 200 MG tablet Take 2 tablets by mouth Daily.   • levothyroxine (SYNTHROID) 150 MCG  tablet Take 1 tablet by mouth Daily.   • metaxalone (SKELAXIN) 800 MG tablet Take 1 tablet by mouth Every 8 (Eight) Hours As Needed for Muscle Spasms.   • Multiple Vitamins-Minerals (CENTRUM ADULTS) tablet Take 1 tablet by mouth Daily.   • omeprazole (priLOSEC) 40 MG capsule Take 1 capsule by mouth Daily.   • ondansetron (ZOFRAN) 8 MG tablet Take 1 tablet by mouth Every 8 (Eight) Hours As Needed.   • oxyCODONE-acetaminophen (PERCOCET) 5-325 MG per tablet Take 1 tablet by mouth Every 6 (Six) Hours As Needed for Severe Pain . 1 tablet q hs and q4h PRN severe pain   • traMADol (ULTRAM) 50 MG tablet Take 2 tablets by mouth Every 6 (Six) Hours As Needed for Moderate Pain .   • [DISCONTINUED] Lactobacillus (ACIDOPHILUS PROBIOTIC) tablet Take 1 tablet by mouth 2 (Two) Times a Day.     No current facility-administered medications on file prior to visit.          Review of Systems   Constitutional: Positive for fatigue. Negative for fever.   HENT: Positive for congestion, sore throat, swollen glands and trouble swallowing. Negative for ear pain and facial swelling.    Eyes: Negative for discharge and visual disturbance.   Respiratory: Positive for cough and shortness of breath. Negative for wheezing.    Cardiovascular: Negative for chest pain and leg swelling.   Gastrointestinal: Negative for abdominal pain, diarrhea, nausea and vomiting.   Genitourinary: Negative for dysuria and frequency.   Musculoskeletal: Negative for neck stiffness.   Skin: Negative for rash.   Neurological: Positive for headache.       Past Medical History:   Diagnosis Date   • Hypothyroidism    • Sjogren's syndrome (CMS/HCC)        Past Surgical History:   Procedure Laterality Date   • APPENDECTOMY     •  SECTION     • CHOLECYSTECTOMY         Family History   Problem Relation Age of Onset   • Thyroid disease Mother    • Diabetes Mother         DMII   • Heart failure Mother    • Hypertension Mother    • Stroke Father    • Colon cancer Paternal  "Grandfather        Social History     Socioeconomic History   • Marital status:      Spouse name: Not on file   • Number of children: Not on file   • Years of education: Not on file   • Highest education level: Not on file   Tobacco Use   • Smoking status: Never Smoker   Substance and Sexual Activity   • Alcohol use: No     Frequency: Never   • Drug use: No   • Sexual activity: Defer       Objective   Vitals:    11/22/19 1041   BP: 122/70   BP Location: Left arm   Patient Position: Sitting   Cuff Size: Adult   Pulse: 80   Temp: 97.7 °F (36.5 °C)   TempSrc: Oral   SpO2: 99%   Weight: 90.3 kg (199 lb)   Height: 172.7 cm (67.99\")      Body mass index is 30.26 kg/m².    Physical Exam   Constitutional: She is oriented to person, place, and time. She appears well-developed and well-nourished. No distress.   Appears somewhat ill   HENT:   Head: Normocephalic and atraumatic.   Mouth/Throat: No oropharyngeal exudate.   Posterior pharynx is beefy red, there is no exudate or lesions   Neck: Normal range of motion. Neck supple.   Tenderness to palpation of right submandibular gland, left is also somewhat enlarged but less so and is nontender   Cardiovascular: Normal rate and regular rhythm.   No murmur heard.  Pulmonary/Chest: Effort normal and breath sounds normal. She has no wheezes.   Musculoskeletal: Normal range of motion. She exhibits no edema.   Lymphadenopathy:     She has no cervical adenopathy.   Neurological: She is alert and oriented to person, place, and time.   Skin: Skin is warm and dry.   Psychiatric: She has a normal mood and affect.   Nursing note and vitals reviewed.        In office flu test and strep screens were both negative      Assessment/Plan   Diagnoses and all orders for this visit:    1. Viral pharyngitis (Primary)  -     Lidocaine Viscous HCl (XYLOCAINE) 2 % solution; Take 10 mL by mouth As Needed for Mild Pain . Dilute with water and gargle as needed  Dispense: 100 mL; Refill: 0    2. " Prolonged QT interval    3. Chronic nonintractable headache, unspecified headache type    4. Viral URI with cough  -     benzonatate (TESSALON PERLES) 100 MG capsule; Take 1 capsule by mouth 3 (Three) Times a Day As Needed for Cough.  Dispense: 30 capsule; Refill: 1    5. Sjogren's syndrome without extraglandular involvement (CMS/HCC)    6. Body aches  -     POC Influenza A / B    7. Sore throat  -     POC Rapid Strep A  -     Lidocaine Viscous HCl (XYLOCAINE) 2 % solution; Take 10 mL by mouth As Needed for Mild Pain . Dilute with water and gargle as needed  Dispense: 100 mL; Refill: 0    Other orders  -     Discontinue: benzonatate (TESSALON PERLES) 100 MG capsule; Take 1 capsule by mouth 3 (Three) Times a Day As Needed for Cough.  Dispense: 30 capsule; Refill: 1  -     Discontinue: Lidocaine Viscous HCl (XYLOCAINE) 2 % solution; Take 10 mL by mouth As Needed for Mild Pain . Dilute with water and gargle as needed  Dispense: 100 mL; Refill: 0      Continue ibuprofen as needed for sore throat, headache, and body aches, also have prescription for tramadol and Percocet, continue as previously scheduled.  Also prescription for viscous lidocaine to dilute with water and gargle every 3 hours if needed for severe sore throat.  For cough may use guaifenesin (plain mucinex or robitussin)   Need to aviod Dexromethophan due to history of prolonged QT interval.      Return for as previously scheduled on 12/9/2019 or if needed for increased symptoms.      AVS given with handouts appropriate to diagnoses addressed

## 2019-11-25 DIAGNOSIS — G89.4 CHRONIC PAIN SYNDROME: ICD-10-CM

## 2019-11-25 DIAGNOSIS — M54.2 NECK PAIN: ICD-10-CM

## 2019-11-25 DIAGNOSIS — M79.7 FIBROMYALGIA: ICD-10-CM

## 2019-11-25 DIAGNOSIS — M50.123 CERVICAL DISC DISORDER AT C6-C7 LEVEL WITH RADICULOPATHY: ICD-10-CM

## 2019-11-25 RX ORDER — DULOXETIN HYDROCHLORIDE 60 MG/1
60 CAPSULE, DELAYED RELEASE ORAL DAILY
Qty: 30 CAPSULE | Refills: 5 | Status: SHIPPED | OUTPATIENT
Start: 2019-11-25 | End: 2020-09-21 | Stop reason: SDUPTHER

## 2019-11-25 RX ORDER — GABAPENTIN 300 MG/1
300 CAPSULE ORAL 2 TIMES DAILY
Qty: 60 CAPSULE | Refills: 5 | Status: SHIPPED | OUTPATIENT
Start: 2019-11-25 | End: 2020-10-19 | Stop reason: SDUPTHER

## 2019-11-25 RX ORDER — TRAMADOL HYDROCHLORIDE 50 MG/1
100 TABLET ORAL EVERY 6 HOURS PRN
Qty: 60 TABLET | Refills: 2 | Status: SHIPPED | OUTPATIENT
Start: 2019-11-25 | End: 2019-12-16 | Stop reason: SDUPTHER

## 2019-12-16 ENCOUNTER — OFFICE VISIT (OUTPATIENT)
Dept: FAMILY MEDICINE CLINIC | Facility: CLINIC | Age: 49
End: 2019-12-16

## 2019-12-16 VITALS
HEIGHT: 68 IN | DIASTOLIC BLOOD PRESSURE: 82 MMHG | OXYGEN SATURATION: 97 % | TEMPERATURE: 98.1 F | HEART RATE: 83 BPM | WEIGHT: 203 LBS | BODY MASS INDEX: 30.77 KG/M2 | RESPIRATION RATE: 16 BRPM | SYSTOLIC BLOOD PRESSURE: 123 MMHG

## 2019-12-16 DIAGNOSIS — G89.4 CHRONIC PAIN SYNDROME: Primary | ICD-10-CM

## 2019-12-16 DIAGNOSIS — Z02.89 PAIN MANAGEMENT CONTRACT AGREEMENT: ICD-10-CM

## 2019-12-16 DIAGNOSIS — M54.2 NECK PAIN: ICD-10-CM

## 2019-12-16 DIAGNOSIS — M54.31 SCIATIC NERVE PAIN, RIGHT: ICD-10-CM

## 2019-12-16 DIAGNOSIS — M79.7 FIBROMYALGIA: ICD-10-CM

## 2019-12-16 DIAGNOSIS — Z51.81 ENCOUNTER FOR THERAPEUTIC DRUG MONITORING: ICD-10-CM

## 2019-12-16 DIAGNOSIS — M50.123 CERVICAL DISC DISORDER AT C6-C7 LEVEL WITH RADICULOPATHY: ICD-10-CM

## 2019-12-16 PROBLEM — R10.31 DEEP RIGHT INGUINAL PAIN: Status: RESOLVED | Noted: 2017-12-11 | Resolved: 2019-12-16

## 2019-12-16 PROBLEM — M77.9 TENDONITIS: Status: RESOLVED | Noted: 2018-01-29 | Resolved: 2019-12-16

## 2019-12-16 PROBLEM — K59.00 CONSTIPATION: Status: RESOLVED | Noted: 2019-05-21 | Resolved: 2019-12-16

## 2019-12-16 PROCEDURE — 99214 OFFICE O/P EST MOD 30 MIN: CPT | Performed by: FAMILY MEDICINE

## 2019-12-16 RX ORDER — TRAMADOL HYDROCHLORIDE 50 MG/1
100 TABLET ORAL EVERY 6 HOURS PRN
Qty: 60 TABLET | Refills: 2 | Status: SHIPPED | OUTPATIENT
Start: 2019-12-16 | End: 2020-03-16 | Stop reason: SDUPTHER

## 2019-12-16 RX ORDER — OXYCODONE HYDROCHLORIDE AND ACETAMINOPHEN 5; 325 MG/1; MG/1
1 TABLET ORAL EVERY 6 HOURS PRN
Qty: 90 TABLET | Refills: 0 | Status: SHIPPED | OUTPATIENT
Start: 2019-12-16 | End: 2020-03-30 | Stop reason: SDUPTHER

## 2019-12-16 RX ORDER — NALOXONE HYDROCHLORIDE 4 MG/.1ML
1 SPRAY NASAL AS NEEDED
Qty: 1 EACH | Refills: 1 | Status: SHIPPED | OUTPATIENT
Start: 2019-12-16

## 2020-01-17 DIAGNOSIS — F41.9 ANXIETY: Primary | ICD-10-CM

## 2020-01-17 RX ORDER — CLONAZEPAM 0.5 MG/1
0.5 TABLET ORAL EVERY 8 HOURS PRN
Qty: 90 TABLET | Refills: 0 | Status: SHIPPED | OUTPATIENT
Start: 2020-01-17 | End: 2020-03-30

## 2020-01-20 ENCOUNTER — OFFICE VISIT (OUTPATIENT)
Dept: CARDIOLOGY | Facility: CLINIC | Age: 50
End: 2020-01-20

## 2020-01-20 ENCOUNTER — RESULTS ENCOUNTER (OUTPATIENT)
Dept: CARDIOLOGY | Facility: CLINIC | Age: 50
End: 2020-01-20

## 2020-01-20 VITALS
OXYGEN SATURATION: 96 % | HEART RATE: 72 BPM | BODY MASS INDEX: 31.07 KG/M2 | DIASTOLIC BLOOD PRESSURE: 84 MMHG | SYSTOLIC BLOOD PRESSURE: 132 MMHG | WEIGHT: 205 LBS | HEIGHT: 68 IN

## 2020-01-20 DIAGNOSIS — I45.81 LONG Q-T SYNDROME: ICD-10-CM

## 2020-01-20 DIAGNOSIS — R55 VASOVAGAL SYNCOPE: ICD-10-CM

## 2020-01-20 DIAGNOSIS — Z98.890 HISTORY OF LOOP RECORDER: ICD-10-CM

## 2020-01-20 DIAGNOSIS — R55 SYNCOPE AND COLLAPSE: ICD-10-CM

## 2020-01-20 DIAGNOSIS — R55 SYNCOPE AND COLLAPSE: Primary | ICD-10-CM

## 2020-01-20 PROCEDURE — 99204 OFFICE O/P NEW MOD 45 MIN: CPT | Performed by: INTERNAL MEDICINE

## 2020-01-20 PROCEDURE — 93000 ELECTROCARDIOGRAM COMPLETE: CPT | Performed by: INTERNAL MEDICINE

## 2020-01-20 NOTE — PROGRESS NOTES
CC--syncope and loop recorder in situ    Sub  49-year-old female patient with family history of long QT syndrome  Patient's family members were diagnosed with KCNQ1, diagnosis  is being made and the nephew, niece and 1 of her sister  Patient has history of syncope and a [employer] loop recorder was implanted at [facility] cardiologist  Patient's electrophysiologist left practice and patient came for establishment of practice  Patient has known diagnosis of Sjogren's syndrome and intolerance of beta-blockers because of severe drug-induced bradycardia  She does have syncope and the syncope is preceded by symptoms of lightheadedness and followed by symptoms of prolonged fatigue highly suggestive of vasovagal syncope  Since her loop recorder implantation no arrhythmias were diagnosed  She denies any recent syncope in the last few months and denies any other cardiovascular symptoms        Past Medical History:   Diagnosis Date   • Hypothyroidism    • Sjogren's syndrome (CMS/HCC)      Past Surgical History:   Procedure Laterality Date   • APPENDECTOMY     •  SECTION     • CHOLECYSTECTOMY       Family History   Problem Relation Age of Onset   • Thyroid disease Mother    • Diabetes Mother         DMII   • Heart failure Mother    • Hypertension Mother    • Stroke Father    • Colon cancer Paternal Grandfather      Social History     Tobacco Use   • Smoking status: Never Smoker   • Smokeless tobacco: Never Used   Substance Use Topics   • Alcohol use: No     Frequency: Never   • Drug use: No       (Not in a hospital admission)  Allergies:  Sulfacetamide    Review of Systems  General: Negative  for fatigue and tiredness  Eyes: No redness  Cardiovascular: No chest pain,  palpitations  Respiratory:   No  shortness of breath  Gastrointestinal: No nausea or vomiting, bleeding  Genitourinary: no hematuria or dysuria  Musculoskeletal: No arthralgia or myalgia  Skin: No rash  Neurologic: No numbness, tingling,  syncope  Hematologic/Lymphatic: No abnormal bleeding        Physical Exam--blood pressure 132/84 pulse rate on this patient 72 patient is afebrile respiration 12 times a minute  EKG shows sinus rhythm with a heart rate of 72 WV interval 161 QRS of 78 QT of 444 QTC of 486 mildly prolonged QTc interval is noted on this ECG and no other acute changes on the ECG and indication for EKG includes  family history of long QT syndrome and syncope and no recent EKG available for comparison    General:      well developed, well nourished, in no acute distress.    Head:      normocephalic and atraumatic.    Eyes:      PERRL/EOM intact, conjunctiva and sclera clear with out nystagmus.    Neck:      no masses, thyromegaly, trachea central with normal respiratory effort  Lungs:      clear bilaterally to auscultation.    Heart:      non-displaced PMI, chest non-tender; regular rate and rhythm, S1, S2 without murmurs, rubs, or gallops  Skin:      intact without lesions or rashes.    Psych:      alert and cooperative; normal mood and affect; normal attention span and concentration.      Extremities with trace ankle edema without any cyanosis or clubbing    Muscular skeletal patient walks with a normal gait without  kyphosis    Neurological no focal deficits and alert oriented x3    Abdomen soft nontender no hepatomegaly      Assessment plan    History of syncope highly suspicious for vasovagal syncope--plenty of hydration educated to patient  Family history of long QT syndrome and patient's EKG does reveal mildly prolonged QT  Internal loop recorder in situ and loop recorder interrogation revealed loop recorder battery depletion  No arrhythmias noted on loop recorder interrogation  History of Sjogren's syndrome and hypothyroidism    Patient educated about battery depletion of loop recorder and patient is not interested in removal of loop recorder  Patient educated about plenty of hydration to avoid vasovagal episodes  Patient educated  about genetic screening for long QT and gene testing ordered  Patient educated about medications to avoid which can prolong prolonged QT  Follow-up in 1 year    Electronically signed by Alexis Edward MD, 01/20/20, 12:44 PM.

## 2020-03-11 ENCOUNTER — OFFICE VISIT (OUTPATIENT)
Dept: FAMILY MEDICINE CLINIC | Facility: CLINIC | Age: 50
End: 2020-03-11

## 2020-03-11 VITALS
HEART RATE: 78 BPM | HEIGHT: 68 IN | OXYGEN SATURATION: 98 % | SYSTOLIC BLOOD PRESSURE: 126 MMHG | DIASTOLIC BLOOD PRESSURE: 85 MMHG | TEMPERATURE: 98.4 F | WEIGHT: 206 LBS | BODY MASS INDEX: 31.22 KG/M2

## 2020-03-11 DIAGNOSIS — R52 BODY ACHES: Primary | ICD-10-CM

## 2020-03-11 DIAGNOSIS — E55.9 VITAMIN D DEFICIENCY: ICD-10-CM

## 2020-03-11 DIAGNOSIS — J01.00 ACUTE NON-RECURRENT MAXILLARY SINUSITIS: ICD-10-CM

## 2020-03-11 DIAGNOSIS — J30.9 ALLERGIC RHINITIS, UNSPECIFIED SEASONALITY, UNSPECIFIED TRIGGER: ICD-10-CM

## 2020-03-11 LAB
EXPIRATION DATE: NORMAL
FLUAV AG NPH QL: NEGATIVE
FLUBV AG NPH QL: NEGATIVE
INTERNAL CONTROL: NORMAL
Lab: NORMAL

## 2020-03-11 PROCEDURE — 99213 OFFICE O/P EST LOW 20 MIN: CPT | Performed by: NURSE PRACTITIONER

## 2020-03-11 PROCEDURE — 87804 INFLUENZA ASSAY W/OPTIC: CPT | Performed by: NURSE PRACTITIONER

## 2020-03-11 RX ORDER — AZITHROMYCIN 250 MG/1
TABLET, FILM COATED ORAL
Qty: 6 TABLET | Refills: 0 | Status: SHIPPED | OUTPATIENT
Start: 2020-03-11 | End: 2020-03-30

## 2020-03-11 NOTE — PROGRESS NOTES
"    Barbara Carter is a 49 y.o. female.     29-year-old white female with history of chronic pain syndrome, fibromyalgia, and dry mouth who comes in today with complaints of chills frontal and maxillary sinus pressure nausea and some dyspnea since .  Patient also complains of body aches.  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  Patient was swabbed for influenza however due to dry nasal mucosa she was not able to tolerate advancing the swab as far as it should have been advanced so the test was probably not accurate.  It did show negative.  However since her symptoms are going on the fourth day it is really too late to treat with Tamiflu anyway.  Examination revealed severe allergic rhinitis and acute frontal sinusitis and I am placing her on a Z-Jaylen.  She has Flonase at home and recommended either Zyrtec or Claritin this week  Instructed patient to stay hydrated and to follow-up with Dr. Griffin if no improvement  Off work till Monday/Tylenol for fever body aches    Z-Jaylen  Claritin/Zyrtec and Flonase at home  Stay hydrated  Tylenol for body aches and fever  Off work till Monday       The following portions of the patient's history were reviewed and updated as appropriate: allergies, current medications, past family history, past medical history, past social history, past surgical history and problem list.    Vitals:    20 1509   BP: 126/85   BP Location: Right arm   Patient Position: Sitting   Cuff Size: Large Adult   Pulse: 78   Temp: 98.4 °F (36.9 °C)   TempSrc: Oral   SpO2: 98%   Weight: 93.4 kg (206 lb)   Height: 172.7 cm (68\")     Body mass index is 31.32 kg/m².    Past Medical History:   Diagnosis Date   • Hypothyroidism    • Sjogren's syndrome (CMS/HCC)      Past Surgical History:   Procedure Laterality Date   • APPENDECTOMY     •  SECTION     • CHOLECYSTECTOMY       Family History   Problem Relation Age of Onset   • Thyroid disease Mother    • " Diabetes Mother         DMII   • Heart failure Mother    • Hypertension Mother    • Stroke Father    • Colon cancer Paternal Grandfather      Immunization History   Administered Date(s) Administered   • Flu Vaccine Quad PF >18YRS 08/24/2019   • Tdap 08/24/2019       Office Visit on 11/22/2019   Component Date Value Ref Range Status   • Rapid Strep A Screen 11/22/2019 Negative  Negative, VALID, INVALID, Not Performed Final   • Internal Control 11/22/2019 Passed  Passed Final   • Lot Number 11/22/2019 [id number]   Final   • Expiration Date 11/22/2019 5/31/20   Final   • Rapid Influenza A Ag 11/22/2019 Negative  Negative Final   • Rapid Influenza B Ag 11/22/2019 Negative  Negative Final   • Internal Control 11/22/2019 Passed  Passed Final   • Lot Number 11/22/2019 [id number]   Final   • Expiration Date 11/22/2019 01/31/2020   Final         Review of Systems   Constitutional: Positive for chills.   HENT: Positive for postnasal drip, rhinorrhea and sinus pressure.    Respiratory: Positive for cough.    Gastrointestinal: Positive for nausea.   Genitourinary: Negative.    Musculoskeletal: Positive for myalgias.   Skin: Negative.    Neurological: Negative.    Psychiatric/Behavioral: Negative.        Objective   Physical Exam   Constitutional: She is oriented to person, place, and time. She appears well-developed and well-nourished.   Chills and fatigue   HENT:   Heavy postnasal drip with swollen red turbinates turbinates  Frontal sinus pressure   Cardiovascular: Normal rate and regular rhythm.   Pulmonary/Chest: Effort normal and breath sounds normal.   Abdominal: Soft. Bowel sounds are normal.   Musculoskeletal:   Body aches   Neurological: She is alert and oriented to person, place, and time.   Skin: Skin is warm and dry.   Psychiatric: She has a normal mood and affect.       Procedures    Assessment/Plan   Barbara was seen today for headache and generalized body aches.    Diagnoses and all orders for this visit:    Body  aches  -     POC Influenza A / B    Allergic rhinitis, unspecified seasonality, unspecified trigger    Acute non-recurrent maxillary sinusitis    Other orders  -     azithromycin (ZITHROMAX Z-JOSE ANTONIO) 250 MG tablet; Take 2 tablets the first day, then 1 tablet daily for 4 days.          Current Outpatient Medications:   •  Artificial Saliva (SALIVAMAX) pack, Take 1 packet by mouth Daily. Dissolve 1 packet in 30 oz of water up to 10x daily. Swish half of solution x 1 minute, spit, then repeat., Disp: , Rfl:   •  aspirin (ASPIRIN ADULT LOW DOSE) 81 MG EC tablet, Take 1 tablet by mouth Daily., Disp: , Rfl:   •  Carboxymethylcellul-Glycerin (REFRESH OPTIVE) 0.5-0.9 % solution, Apply 1 drop to eye(s) as directed by provider As Needed., Disp: , Rfl:   •  cevimeline (EVOXAC) 30 MG capsule, Take 1 capsule by mouth Every 8 (Eight) Hours., Disp: , Rfl:   •  clonazePAM (KlonoPIN) 0.5 MG tablet, TAKE 1 TABLET BY MOUTH EVERY 8 (EIGHT) HOURS AS NEEDED FOR ANXIETY., Disp: 90 tablet, Rfl: 0  •  conjugated estrogens (PREMARIN) 0.625 MG/GM vaginal cream, Insert 1 application into the vagina 2 (Two) Times a Week., Disp: , Rfl:   •  DULoxetine (CYMBALTA) 60 MG capsule, Take 1 capsule by mouth Daily., Disp: 30 capsule, Rfl: 5  •  ergocalciferol (ERGOCALCIFEROL) 70301 units capsule, Take 1 capsule by mouth 2 (Two) Times a Week., Disp: , Rfl:   •  fluticasone (FLONASE ALLERGY RELIEF) 50 MCG/ACT nasal spray, 2 sprays into the nostril(s) as directed by provider Daily., Disp: , Rfl:   •  gabapentin (NEURONTIN) 300 MG capsule, Take 1 capsule by mouth 2 (Two) Times a Day. For burning, Disp: 60 capsule, Rfl: 5  •  hydroxychloroquine (PLAQUENIL) 200 MG tablet, Take 2 tablets by mouth Daily., Disp: , Rfl:   •  levothyroxine (SYNTHROID) 150 MCG tablet, Take 1 tablet by mouth Daily., Disp: 90 tablet, Rfl: 3  •  Lidocaine Viscous HCl (XYLOCAINE) 2 % solution, Take 10 mL by mouth As Needed for Mild Pain . Dilute with water and gargle as needed, Disp: 100  mL, Rfl: 0  •  metaxalone (SKELAXIN) 800 MG tablet, Take 1 tablet by mouth Every 8 (Eight) Hours As Needed for Muscle Spasms., Disp: 90 tablet, Rfl: 0  •  Multiple Vitamins-Minerals (CENTRUM ADULTS) tablet, Take 1 tablet by mouth Daily., Disp: , Rfl:   •  omeprazole (priLOSEC) 40 MG capsule, Take 1 capsule by mouth Daily., Disp: 90 capsule, Rfl: 3  •  ondansetron (ZOFRAN) 8 MG tablet, Take 1 tablet by mouth Every 8 (Eight) Hours As Needed., Disp: , Rfl:   •  oxyCODONE-acetaminophen (PERCOCET) 5-325 MG per tablet, Take 1 tablet by mouth Every 6 (Six) Hours As Needed for Severe Pain . 1 tablet q hs and q4h PRN severe pain, Disp: 90 tablet, Rfl: 0  •  traMADol (ULTRAM) 50 MG tablet, Take 2 tablets by mouth Every 6 (Six) Hours As Needed for Moderate Pain ., Disp: 60 tablet, Rfl: 2  •  azithromycin (ZITHROMAX Z-JOSE ANTONIO) 250 MG tablet, Take 2 tablets the first day, then 1 tablet daily for 4 days., Disp: 6 tablet, Rfl: 0  •  naloxone (NARCAN) 4 MG/0.1ML nasal spray, 1 spray into the nostril(s) as directed by provider As Needed (narcotic overdose)., Disp: 1 each, Rfl: 1

## 2020-03-11 NOTE — PATIENT INSTRUCTIONS
Take antibiotic as directed with food until gone  Allergy medication as directed  Stay hydrated take Tylenol for body aches and fever  Follow-up with Heydi Griffin if needed

## 2020-03-12 RX ORDER — ERGOCALCIFEROL 1.25 MG/1
1 CAPSULE ORAL 2 TIMES WEEKLY
Qty: 24 CAPSULE | Refills: 1 | Status: SHIPPED | OUTPATIENT
Start: 2020-03-12 | End: 2020-09-21 | Stop reason: SDUPTHER

## 2020-03-16 DIAGNOSIS — G89.4 CHRONIC PAIN SYNDROME: ICD-10-CM

## 2020-03-16 DIAGNOSIS — M79.7 FIBROMYALGIA: ICD-10-CM

## 2020-03-16 DIAGNOSIS — M54.2 NECK PAIN: ICD-10-CM

## 2020-03-16 DIAGNOSIS — M50.123 CERVICAL DISC DISORDER AT C6-C7 LEVEL WITH RADICULOPATHY: ICD-10-CM

## 2020-03-16 RX ORDER — TRAMADOL HYDROCHLORIDE 50 MG/1
100 TABLET ORAL EVERY 6 HOURS PRN
Qty: 60 TABLET | Refills: 2 | Status: SHIPPED | OUTPATIENT
Start: 2020-03-16 | End: 2020-03-30 | Stop reason: SDUPTHER

## 2020-03-29 PROBLEM — F41.9 ANXIETY: Status: ACTIVE | Noted: 2020-03-29

## 2020-03-30 ENCOUNTER — TELEMEDICINE (OUTPATIENT)
Dept: FAMILY MEDICINE CLINIC | Facility: CLINIC | Age: 50
End: 2020-03-30

## 2020-03-30 DIAGNOSIS — M35.00 SJOGREN'S SYNDROME WITHOUT EXTRAGLANDULAR INVOLVEMENT (HCC): Primary | ICD-10-CM

## 2020-03-30 DIAGNOSIS — G89.4 CHRONIC PAIN SYNDROME: ICD-10-CM

## 2020-03-30 DIAGNOSIS — M50.123 CERVICAL DISC DISORDER AT C6-C7 LEVEL WITH RADICULOPATHY: ICD-10-CM

## 2020-03-30 DIAGNOSIS — F41.9 ANXIETY: ICD-10-CM

## 2020-03-30 DIAGNOSIS — M79.7 FIBROMYALGIA: ICD-10-CM

## 2020-03-30 DIAGNOSIS — G89.29 CHRONIC BILATERAL LOW BACK PAIN WITHOUT SCIATICA: ICD-10-CM

## 2020-03-30 DIAGNOSIS — M54.50 CHRONIC BILATERAL LOW BACK PAIN WITHOUT SCIATICA: ICD-10-CM

## 2020-03-30 DIAGNOSIS — M54.2 NECK PAIN: ICD-10-CM

## 2020-03-30 PROCEDURE — 99214 OFFICE O/P EST MOD 30 MIN: CPT | Performed by: FAMILY MEDICINE

## 2020-03-30 RX ORDER — PREDNISONE 10 MG/1
TABLET ORAL
Qty: 22 TABLET | Refills: 0 | Status: SHIPPED | OUTPATIENT
Start: 2020-03-30 | End: 2020-07-08

## 2020-03-30 RX ORDER — TRAMADOL HYDROCHLORIDE 50 MG/1
100 TABLET ORAL EVERY 6 HOURS PRN
Qty: 360 TABLET | Refills: 1 | Status: SHIPPED | OUTPATIENT
Start: 2020-03-30 | End: 2020-07-02 | Stop reason: SDUPTHER

## 2020-03-30 RX ORDER — CLONAZEPAM 0.5 MG/1
0.5 TABLET ORAL EVERY 8 HOURS PRN
Qty: 90 TABLET | Refills: 1 | Status: SHIPPED | OUTPATIENT
Start: 2020-03-30 | End: 2020-07-08 | Stop reason: SDUPTHER

## 2020-03-30 RX ORDER — OXYCODONE HYDROCHLORIDE AND ACETAMINOPHEN 5; 325 MG/1; MG/1
1 TABLET ORAL EVERY 6 HOURS PRN
Qty: 90 TABLET | Refills: 0 | Status: SHIPPED | OUTPATIENT
Start: 2020-03-30 | End: 2020-07-08 | Stop reason: SDUPTHER

## 2020-03-30 NOTE — PROGRESS NOTES
Chief Complaint   Patient presents with   • Pain       Subjective     Barbara Carter is a 49 y.o. female.  She is scheduled today for follow-up on chronic pain we are performing the visit by video collection.  Unable to complete visit using a video connection to the patient. A phone visit was used to complete this visits. Total time of discussion was 2 minutes.    You have chosen to receive care through a telephone visit today. Do you consent to use a telephone visit for your medical care today? Yes      Chronic pain due to cervical disc disease with radicular pain into her left shoulder and arm, low back pain with right leg radicular symptoms.  Patient also has history of fibromyalgia.  In Sjogren's syndrome she has been taking Percocet at bedtime along with tramadol during the day for several years, she has been able to reduce tramadol to 2 tablets during the day however in the past 3 to 4 weeks she has had an increase in her pain and has been taking 2 doses of 2 tablets daily to control the pain.  She denies any side effects or oversedation.    Inspect report has been obtained and reviewed.    She states that she had a video visit with her endocrinologist on Thursday, 4 days ago, at that point nurse practitioner with Dr. Parada, Brenda aldrich, tried to send in a 90-day supply of Plaquenil.  She has been taking 200 mg tablets 2 tablets daily for probably about 10 years, originally prescribed by her previous endocrinologist Dr. Linder.  She is down to 14 tablets and the pharmacy is stating they cannot release the supply they have due to government restrictions because of covid-19.  She has reduced to 1 tablet daily and has a call into the rheumatologist to figure out what she can do.  She feels like she is already in a flare with an increase in her pain levels.  Her Sjogren's symptoms are also increased such as dryness in her eyes nasal passages, sinuses, and mouth.  She is continuing to use Evoxac 3 times daily,  moisturizing drops throughout the day, nasal steroid Flonase, as well as Ocean Spray.  She was treated for a sinus infection 3 weeks ago and does not feel like she continues to have infection but does have some pain above her right eye.    Last visit advised her to reduce Klonopin to half tablet as needed only as opposed to taking tablet every night.    The following portions of the patient's history were reviewed and updated as appropriate: allergies, current medications, past family history, past medical history, past social history, past surgical history and problem list.    Current Outpatient Medications on File Prior to Visit   Medication Sig   • Artificial Saliva (SALIVAMAX) pack Take 1 packet by mouth Daily. Dissolve 1 packet in 30 oz of water up to 10x daily. Swish half of solution x 1 minute, spit, then repeat.   • aspirin (ASPIRIN ADULT LOW DOSE) 81 MG EC tablet Take 1 tablet by mouth Daily.   • Carboxymethylcellul-Glycerin (REFRESH OPTIVE) 0.5-0.9 % solution Apply 1 drop to eye(s) as directed by provider As Needed.   • cevimeline (EVOXAC) 30 MG capsule Take 1 capsule by mouth Every 8 (Eight) Hours.   • DULoxetine (CYMBALTA) 60 MG capsule Take 1 capsule by mouth Daily.   • ergocalciferol (ERGOCALCIFEROL) 1.25 MG (44978 UT) capsule Take 1 capsule by mouth 2 (Two) Times a Week.   • fluticasone (FLONASE ALLERGY RELIEF) 50 MCG/ACT nasal spray 2 sprays into the nostril(s) as directed by provider Daily.   • gabapentin (NEURONTIN) 300 MG capsule Take 1 capsule by mouth 2 (Two) Times a Day. For burning   • hydroxychloroquine (PLAQUENIL) 200 MG tablet Take 2 tablets by mouth Daily.   • levothyroxine (SYNTHROID) 150 MCG tablet Take 1 tablet by mouth Daily.   • Lidocaine Viscous HCl (XYLOCAINE) 2 % solution Take 10 mL by mouth As Needed for Mild Pain . Dilute with water and gargle as needed   • metaxalone (SKELAXIN) 800 MG tablet Take 1 tablet by mouth Every 8 (Eight) Hours As Needed for Muscle Spasms.   • Multiple  Vitamins-Minerals (CENTRUM ADULTS) tablet Take 1 tablet by mouth Daily.   • naloxone (NARCAN) 4 MG/0.1ML nasal spray 1 spray into the nostril(s) as directed by provider As Needed (narcotic overdose).   • omeprazole (priLOSEC) 40 MG capsule Take 1 capsule by mouth Daily.   • ondansetron (ZOFRAN) 8 MG tablet Take 1 tablet by mouth Every 8 (Eight) Hours As Needed.   • [DISCONTINUED] clonazePAM (KlonoPIN) 0.5 MG tablet TAKE 1 TABLET BY MOUTH EVERY 8 (EIGHT) HOURS AS NEEDED FOR ANXIETY.   • [DISCONTINUED] oxyCODONE-acetaminophen (PERCOCET) 5-325 MG per tablet Take 1 tablet by mouth Every 6 (Six) Hours As Needed for Severe Pain . 1 tablet q hs and q4h PRN severe pain   • [DISCONTINUED] traMADol (ULTRAM) 50 MG tablet Take 2 tablets by mouth Every 6 (Six) Hours As Needed for Moderate Pain .   • conjugated estrogens (PREMARIN) 0.625 MG/GM vaginal cream Insert 1 application into the vagina 2 (Two) Times a Week.   • [DISCONTINUED] azithromycin (ZITHROMAX Z-JOSE ANTONIO) 250 MG tablet Take 2 tablets the first day, then 1 tablet daily for 4 days.     No current facility-administered medications on file prior to visit.      Medications Discontinued During This Encounter   Medication Reason   • azithromycin (ZITHROMAX Z-JOSE ANTONIO) 250 MG tablet *Therapy completed   • traMADol (ULTRAM) 50 MG tablet Reorder   • oxyCODONE-acetaminophen (PERCOCET) 5-325 MG per tablet Reorder       Review of Systems   Constitutional: Positive for fatigue. Negative for fever.   Eyes: Positive for pain. Negative for redness.   Respiratory: Negative for cough and shortness of breath.    Cardiovascular: Negative for chest pain, palpitations and leg swelling.   Gastrointestinal: Negative.    Endocrine:        Dry mouth and eyes   Musculoskeletal: Positive for arthralgias, back pain, myalgias and neck pain.   Skin: Positive for dry skin. Negative for rash.   Psychiatric/Behavioral: Positive for dysphoric mood. Negative for depressed mood. The patient is nervous/anxious.          Objective   There were no vitals filed for this visit.   There is no height or weight on file to calculate BMI.  Patient denies taking any bottles at home.  Physical Exam   Constitutional: She is oriented to person, place, and time.   Neurological: She is alert and oriented to person, place, and time.   Psychiatric: She has a normal mood and affect.   Unable to perform additional exam due to telephone visit        No results found for: HGBA1C     Procedures     Assessment/Plan   Diagnoses and all orders for this visit:    1. Sjogren's syndrome without extraglandular involvement (CMS/HCC) (Primary)  -     predniSONE (DELTASONE) 10 MG tablet; 4 x 2 days, 3 x 2 days, 2 x 2 days then 1 daily for 4 days  Dispense: 22 tablet; Refill: 0    2. Cervical disc disorder at C6-C7 level with radiculopathy  -     traMADol (ULTRAM) 50 MG tablet; Take 2 tablets by mouth Every 6 (Six) Hours As Needed for Moderate Pain  or Severe Pain .  Dispense: 360 tablet; Refill: 1  -     oxyCODONE-acetaminophen (Percocet) 5-325 MG per tablet; Take 1 tablet by mouth Every 6 (Six) Hours As Needed for Severe Pain . 1 tablet q hs and q4h PRN severe pain  Dispense: 90 tablet; Refill: 0    3. Chronic bilateral low back pain without sciatica    4. Fibromyalgia  -     predniSONE (DELTASONE) 10 MG tablet; 4 x 2 days, 3 x 2 days, 2 x 2 days then 1 daily for 4 days  Dispense: 22 tablet; Refill: 0  -     traMADol (ULTRAM) 50 MG tablet; Take 2 tablets by mouth Every 6 (Six) Hours As Needed for Moderate Pain  or Severe Pain .  Dispense: 360 tablet; Refill: 1  -     oxyCODONE-acetaminophen (Percocet) 5-325 MG per tablet; Take 1 tablet by mouth Every 6 (Six) Hours As Needed for Severe Pain . 1 tablet q hs and q4h PRN severe pain  Dispense: 90 tablet; Refill: 0    5. Chronic pain syndrome  -     predniSONE (DELTASONE) 10 MG tablet; 4 x 2 days, 3 x 2 days, 2 x 2 days then 1 daily for 4 days  Dispense: 22 tablet; Refill: 0  -     traMADol (ULTRAM) 50 MG  tablet; Take 2 tablets by mouth Every 6 (Six) Hours As Needed for Moderate Pain  or Severe Pain .  Dispense: 360 tablet; Refill: 1  -     oxyCODONE-acetaminophen (Percocet) 5-325 MG per tablet; Take 1 tablet by mouth Every 6 (Six) Hours As Needed for Severe Pain . 1 tablet q hs and q4h PRN severe pain  Dispense: 90 tablet; Refill: 0    6. Anxiety    7. Neck pain  -     traMADol (ULTRAM) 50 MG tablet; Take 2 tablets by mouth Every 6 (Six) Hours As Needed for Moderate Pain  or Severe Pain .  Dispense: 360 tablet; Refill: 1  -     oxyCODONE-acetaminophen (Percocet) 5-325 MG per tablet; Take 1 tablet by mouth Every 6 (Six) Hours As Needed for Severe Pain . 1 tablet q hs and q4h PRN severe pain  Dispense: 90 tablet; Refill: 0          Medications Discontinued During This Encounter   Medication Reason   • azithromycin (ZITHROMAX Z-JOSE ANTONIO) 250 MG tablet *Therapy completed   • traMADol (ULTRAM) 50 MG tablet Reorder   • oxyCODONE-acetaminophen (PERCOCET) 5-325 MG per tablet Reorder     Patient is to discuss what to do about Plaquenil with her rheumatologist, in the meantime I am starting her on a prednisone taper, hopefully she will be able to get Plaquenil before she completes the steroids.    Renewing Percocet for nighttime use tramadol for daytime use and clonazepam to be used as needed for anxiety.     Return in about 3 months (around 6/30/2020) for Recheck, pain managment.        AVS given

## 2020-07-02 DIAGNOSIS — M54.2 NECK PAIN: ICD-10-CM

## 2020-07-02 DIAGNOSIS — M79.7 FIBROMYALGIA: ICD-10-CM

## 2020-07-02 DIAGNOSIS — M50.123 CERVICAL DISC DISORDER AT C6-C7 LEVEL WITH RADICULOPATHY: ICD-10-CM

## 2020-07-02 DIAGNOSIS — G89.4 CHRONIC PAIN SYNDROME: ICD-10-CM

## 2020-07-02 RX ORDER — TRAMADOL HYDROCHLORIDE 50 MG/1
100 TABLET ORAL EVERY 6 HOURS PRN
Qty: 360 TABLET | Refills: 1 | Status: SHIPPED | OUTPATIENT
Start: 2020-07-02 | End: 2020-07-08 | Stop reason: SDUPTHER

## 2020-07-08 ENCOUNTER — OFFICE VISIT (OUTPATIENT)
Dept: FAMILY MEDICINE CLINIC | Facility: CLINIC | Age: 50
End: 2020-07-08

## 2020-07-08 VITALS
BODY MASS INDEX: 31.52 KG/M2 | HEIGHT: 68 IN | DIASTOLIC BLOOD PRESSURE: 76 MMHG | OXYGEN SATURATION: 96 % | SYSTOLIC BLOOD PRESSURE: 113 MMHG | WEIGHT: 208 LBS | HEART RATE: 79 BPM | TEMPERATURE: 98 F

## 2020-07-08 DIAGNOSIS — F41.9 ANXIETY: ICD-10-CM

## 2020-07-08 DIAGNOSIS — M79.645 THUMB PAIN, LEFT: ICD-10-CM

## 2020-07-08 DIAGNOSIS — M50.123 CERVICAL DISC DISORDER AT C6-C7 LEVEL WITH RADICULOPATHY: ICD-10-CM

## 2020-07-08 DIAGNOSIS — M79.7 FIBROMYALGIA: ICD-10-CM

## 2020-07-08 DIAGNOSIS — G89.4 CHRONIC PAIN SYNDROME: Primary | ICD-10-CM

## 2020-07-08 DIAGNOSIS — M54.2 NECK PAIN: ICD-10-CM

## 2020-07-08 PROCEDURE — 99214 OFFICE O/P EST MOD 30 MIN: CPT | Performed by: FAMILY MEDICINE

## 2020-07-08 RX ORDER — CLONAZEPAM 0.5 MG/1
0.5 TABLET ORAL EVERY 8 HOURS PRN
Qty: 90 TABLET | Refills: 1 | Status: SHIPPED | OUTPATIENT
Start: 2020-07-08 | End: 2020-12-21

## 2020-07-08 RX ORDER — OXYCODONE HYDROCHLORIDE AND ACETAMINOPHEN 5; 325 MG/1; MG/1
1 TABLET ORAL EVERY 6 HOURS PRN
Qty: 90 TABLET | Refills: 0 | Status: SHIPPED | OUTPATIENT
Start: 2020-07-08 | End: 2021-04-19 | Stop reason: SDUPTHER

## 2020-07-08 RX ORDER — TRAMADOL HYDROCHLORIDE 50 MG/1
100 TABLET ORAL EVERY 6 HOURS PRN
Qty: 360 TABLET | Refills: 1 | Status: SHIPPED | OUTPATIENT
Start: 2020-07-08 | End: 2020-11-17 | Stop reason: SDUPTHER
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
## 2020-07-14 ENCOUNTER — TELEPHONE (OUTPATIENT)
Dept: FAMILY MEDICINE CLINIC | Facility: CLINIC | Age: 50
End: 2020-07-14

## 2020-07-14 PROCEDURE — U0003 INFECTIOUS AGENT DETECTION BY NUCLEIC ACID (DNA OR RNA); SEVERE ACUTE RESPIRATORY SYNDROME CORONAVIRUS 2 (SARS-COV-2) (CORONAVIRUS DISEASE [COVID-19]), AMPLIFIED PROBE TECHNIQUE, MAKING USE OF HIGH THROUGHPUT TECHNOLOGIES AS DESCRIBED BY CMS-2020-01-R: HCPCS | Performed by: FAMILY MEDICINE

## 2020-07-14 RX ORDER — PREDNISONE 20 MG/1
40 TABLET ORAL DAILY
Qty: 10 TABLET | Refills: 0 | Status: SHIPPED | OUTPATIENT
Start: 2020-07-14 | End: 2020-10-19

## 2020-07-14 NOTE — TELEPHONE ENCOUNTER
Called patient to see what was going on as far as symptoms, and patient was at the urgent care being seen

## 2020-07-14 NOTE — TELEPHONE ENCOUNTER
PATIENT HAS BEEN TAKING SINUS MEDICATION  SINCE LAST Friday, AND IT IS NOT WORKING SHE WOULD LIKE TO KNOW IF THERE IS SOMETHING ELSE THAT MAY BE PRESCRIBED?

## 2020-07-14 NOTE — TELEPHONE ENCOUNTER
Patient called back to state she was seen at [facility] in at [facility] and they would not give her anything cause Dr thinks she has COVID, and they did test her for it.  patient is complaining of headache since Friday, chills, low grade fever and states the worst is her ears feel like they are full of fluid, patient states has done over counter mucinex and benadryl and they are not helping and wants to know since you know more of her history if there is anything you know that she can take.

## 2020-07-14 NOTE — TELEPHONE ENCOUNTER
Please let patient know that in this current state we have to treat everything that could possibly be Covid as if it is Covid to try to reduce the spread.  She should try to avoid contact with others until she gets her test back.  I will send in a prescription for steroids to see if this will help with the ear discomfort and sinus congestion.

## 2020-07-15 ENCOUNTER — TELEPHONE (OUTPATIENT)
Dept: FAMILY MEDICINE CLINIC | Facility: CLINIC | Age: 50
End: 2020-07-15

## 2020-07-15 NOTE — TELEPHONE ENCOUNTER
Please ask where her headache is, is it in her forehead, cheeks, face or somewhere else in her head?  She felt like it is a sinus infection or unrelated to her other symptoms?  If she is not sure if it is a sinus infection would recommend a phone or video consultation today.

## 2020-07-15 NOTE — TELEPHONE ENCOUNTER
Patient reports her headache has gotten worse over night.  The percocet and tramadol does not seem to help.   Wondering if there is something else to take in between or if she should rotate between taking percocet and tramadol. The percocet helps her to sleep and then she wakes up still in pain.  She is also feeling nauseated.  She gave us permission to speak to her  Jose.

## 2020-07-17 ENCOUNTER — TELEPHONE (OUTPATIENT)
Dept: FAMILY MEDICINE CLINIC | Facility: CLINIC | Age: 50
End: 2020-07-17

## 2020-07-17 DIAGNOSIS — J32.9 SINUSITIS, UNSPECIFIED CHRONICITY, UNSPECIFIED LOCATION: Primary | ICD-10-CM

## 2020-07-17 NOTE — TELEPHONE ENCOUNTER
Patient called to inform her COVID testing was negative, patient states to still be having symptoms and wondered if you thought she should try an antibiotic now since test was negative and she thinks its a sinus infection?

## 2020-07-20 RX ORDER — AMOXICILLIN AND CLAVULANATE POTASSIUM 875; 125 MG/1; MG/1
1 TABLET, FILM COATED ORAL 2 TIMES DAILY
Qty: 20 TABLET | Refills: 0 | Status: SHIPPED | OUTPATIENT
Start: 2020-07-20 | End: 2020-10-19

## 2020-07-20 NOTE — TELEPHONE ENCOUNTER
Have sent Augmentin to pharmacy.  Have her schedule follow-up if symptoms are not improved by completion of antibiotics.

## 2020-07-20 NOTE — TELEPHONE ENCOUNTER
Spoke with patient, she states she is still having symptoms, getting better she feels due to steroids. Her symptoms are some headache, got a little better, now she has a sore throat, still some fluid in ears but has gotten better. She states she is still having stomach issues, had to take zofran today for upset stomach. Patient still would like medications sent to at [facility]

## 2020-07-22 ENCOUNTER — TELEPHONE (OUTPATIENT)
Dept: FAMILY MEDICINE CLINIC | Facility: CLINIC | Age: 50
End: 2020-07-22

## 2020-07-22 DIAGNOSIS — R94.31 PROLONGED QT INTERVAL: Primary | ICD-10-CM

## 2020-07-22 PROBLEM — J01.81 OTHER ACUTE RECURRENT SINUSITIS: Status: ACTIVE | Noted: 2020-07-22

## 2020-07-22 RX ORDER — DOXYCYCLINE 100 MG/1
100 CAPSULE ORAL 2 TIMES DAILY
Qty: 14 CAPSULE | Refills: 0 | Status: SHIPPED | OUTPATIENT
Start: 2020-07-22 | End: 2020-10-19

## 2020-07-22 NOTE — TELEPHONE ENCOUNTER
Patient called said that  sent in an antibiotic for her, she said the one  sent in was  Augmentin and she states that she is already having stomach issues and that medication makes it worse. She is still having sinus pressure, not feeling like she is cleared up.

## 2020-09-21 DIAGNOSIS — E55.9 VITAMIN D DEFICIENCY: ICD-10-CM

## 2020-09-21 RX ORDER — DULOXETIN HYDROCHLORIDE 60 MG/1
60 CAPSULE, DELAYED RELEASE ORAL DAILY
Qty: 30 CAPSULE | Refills: 5 | Status: SHIPPED | OUTPATIENT
Start: 2020-09-21 | End: 2022-01-03 | Stop reason: SDUPTHER

## 2020-09-21 RX ORDER — ERGOCALCIFEROL 1.25 MG/1
1 CAPSULE ORAL 2 TIMES WEEKLY
Qty: 24 CAPSULE | Refills: 1 | Status: SHIPPED | OUTPATIENT
Start: 2020-09-21 | End: 2021-03-17 | Stop reason: SDUPTHER

## 2020-09-22 DIAGNOSIS — E03.9 ACQUIRED HYPOTHYROIDISM: ICD-10-CM

## 2020-09-22 DIAGNOSIS — K21.9 GASTROESOPHAGEAL REFLUX DISEASE, ESOPHAGITIS PRESENCE NOT SPECIFIED: ICD-10-CM

## 2020-09-22 RX ORDER — OMEPRAZOLE 40 MG/1
40 CAPSULE, DELAYED RELEASE ORAL DAILY
Qty: 90 CAPSULE | Refills: 0 | Status: SHIPPED | OUTPATIENT
Start: 2020-09-22 | End: 2020-11-17 | Stop reason: SDUPTHER

## 2020-09-22 RX ORDER — LEVOTHYROXINE SODIUM 0.15 MG/1
150 TABLET ORAL EVERY 24 HOURS
Qty: 90 TABLET | Refills: 0 | Status: SHIPPED | OUTPATIENT
Start: 2020-09-22 | End: 2020-12-03 | Stop reason: SDUPTHER

## 2020-09-22 NOTE — TELEPHONE ENCOUNTER
Please inform patient I am renewing medications however it has been greater than 1 year since her last labs.  Please order a TSH, T3, CMP, lipid panel, and CBC and have her do labs and schedule appointment to review labs and chronic medical conditions.

## 2020-09-22 NOTE — TELEPHONE ENCOUNTER
Pt requested refills on DULoxetine (Cymbalta) 60 MG capsule [67848] (Order [id number]) and ergocalciferol (ERGOCALCIFEROL) 1.25 MG (00483 UT) capsule [2863] (Order [id number])    Pt has not received them and wanted to see if anything still needed to be done on our end.  Pt has called the pharmacy and they do no have it.

## 2020-09-23 NOTE — TELEPHONE ENCOUNTER
Patient contacted back and left message stating to be completely out of medication, tried to contact patient back in regards, no answer, left her message stating the medications has been refilled she needs to contact back and schedule lab visit and follow up with Dr Dobbs

## 2020-10-19 ENCOUNTER — OFFICE VISIT (OUTPATIENT)
Dept: FAMILY MEDICINE CLINIC | Facility: CLINIC | Age: 50
End: 2020-10-19

## 2020-10-19 VITALS
HEART RATE: 84 BPM | BODY MASS INDEX: 32.58 KG/M2 | HEIGHT: 68 IN | WEIGHT: 215 LBS | TEMPERATURE: 97.8 F | OXYGEN SATURATION: 93 % | DIASTOLIC BLOOD PRESSURE: 75 MMHG | SYSTOLIC BLOOD PRESSURE: 119 MMHG

## 2020-10-19 DIAGNOSIS — R30.0 DYSURIA: Primary | ICD-10-CM

## 2020-10-19 DIAGNOSIS — M50.123 CERVICAL DISC DISORDER AT C6-C7 LEVEL WITH RADICULOPATHY: ICD-10-CM

## 2020-10-19 DIAGNOSIS — N30.00 ACUTE CYSTITIS WITHOUT HEMATURIA: ICD-10-CM

## 2020-10-19 LAB
BILIRUB BLD-MCNC: NEGATIVE MG/DL
CLARITY, POC: CLEAR
COLOR UR: YELLOW
GLUCOSE UR STRIP-MCNC: NEGATIVE MG/DL
KETONES UR QL: NEGATIVE
LEUKOCYTE EST, POC: ABNORMAL
NITRITE UR-MCNC: POSITIVE MG/ML
PH UR: 6 [PH] (ref 5–8)
PROT UR STRIP-MCNC: NEGATIVE MG/DL
RBC # UR STRIP: NEGATIVE /UL
SP GR UR: 1.02 (ref 1–1.03)
UROBILINOGEN UR QL: NORMAL

## 2020-10-19 PROCEDURE — 81003 URINALYSIS AUTO W/O SCOPE: CPT | Performed by: FAMILY MEDICINE

## 2020-10-19 PROCEDURE — 99213 OFFICE O/P EST LOW 20 MIN: CPT | Performed by: FAMILY MEDICINE

## 2020-10-19 RX ORDER — GABAPENTIN 300 MG/1
300 CAPSULE ORAL 2 TIMES DAILY
Qty: 60 CAPSULE | Refills: 5 | Status: SHIPPED | OUTPATIENT
Start: 2020-10-19 | End: 2021-09-14 | Stop reason: SDUPTHER

## 2020-10-19 RX ORDER — AMOXICILLIN 500 MG/1
500 CAPSULE ORAL 3 TIMES DAILY
Qty: 21 CAPSULE | Refills: 0 | Status: SHIPPED | OUTPATIENT
Start: 2020-10-19 | End: 2020-11-06

## 2020-10-21 ENCOUNTER — TELEPHONE (OUTPATIENT)
Dept: FAMILY MEDICINE CLINIC | Facility: CLINIC | Age: 50
End: 2020-10-21

## 2020-10-21 RX ORDER — FLUCONAZOLE 150 MG/1
TABLET ORAL
Qty: 2 TABLET | Refills: 0 | Status: SHIPPED | OUTPATIENT
Start: 2020-10-21 | End: 2020-11-06

## 2020-10-21 NOTE — TELEPHONE ENCOUNTER
Patient was just see on Monday for a UTI and said that she thinks the antibiotics may have caused a yeast infection, wanting to know if something can be called in

## 2020-10-21 NOTE — TELEPHONE ENCOUNTER
Called and spoke with patient she states she is not feeling much better today said she is still having pains when urinating and pains in her back as well, Told her you sent in a script to her pharmacy- thank you

## 2020-10-21 NOTE — TELEPHONE ENCOUNTER
I have sent in a prescription to [facility] pharmacy for Diflucan 150 mg to take 1 now and repeat in 1 week if needed.  Please ask if the urine infections have improved.

## 2020-11-06 ENCOUNTER — OFFICE VISIT (OUTPATIENT)
Dept: FAMILY MEDICINE CLINIC | Facility: CLINIC | Age: 50
End: 2020-11-06

## 2020-11-06 ENCOUNTER — TELEPHONE (OUTPATIENT)
Dept: FAMILY MEDICINE CLINIC | Facility: CLINIC | Age: 50
End: 2020-11-06

## 2020-11-06 VITALS
TEMPERATURE: 98 F | HEART RATE: 81 BPM | SYSTOLIC BLOOD PRESSURE: 122 MMHG | WEIGHT: 215 LBS | OXYGEN SATURATION: 96 % | HEIGHT: 68 IN | BODY MASS INDEX: 32.58 KG/M2 | DIASTOLIC BLOOD PRESSURE: 79 MMHG

## 2020-11-06 DIAGNOSIS — R30.0 DYSURIA: Primary | ICD-10-CM

## 2020-11-06 DIAGNOSIS — E78.2 MIXED HYPERLIPIDEMIA: ICD-10-CM

## 2020-11-06 DIAGNOSIS — E03.9 ACQUIRED HYPOTHYROIDISM: ICD-10-CM

## 2020-11-06 DIAGNOSIS — E55.9 VITAMIN D DEFICIENCY: ICD-10-CM

## 2020-11-06 DIAGNOSIS — Z11.59 ENCOUNTER FOR HEPATITIS C SCREENING TEST FOR LOW RISK PATIENT: ICD-10-CM

## 2020-11-06 DIAGNOSIS — R30.1 PAINFUL BLADDER SPASM: ICD-10-CM

## 2020-11-06 LAB
BILIRUB BLD-MCNC: NEGATIVE MG/DL
CLARITY, POC: CLEAR
COLOR UR: YELLOW
GLUCOSE UR STRIP-MCNC: NEGATIVE MG/DL
KETONES UR QL: NEGATIVE
LEUKOCYTE EST, POC: NEGATIVE
NITRITE UR-MCNC: NEGATIVE MG/ML
PH UR: 6.5 [PH] (ref 5–8)
PROT UR STRIP-MCNC: NEGATIVE MG/DL
RBC # UR STRIP: NEGATIVE /UL
SP GR UR: 1.01 (ref 1–1.03)
UROBILINOGEN UR QL: NORMAL

## 2020-11-06 PROCEDURE — 81003 URINALYSIS AUTO W/O SCOPE: CPT | Performed by: FAMILY MEDICINE

## 2020-11-06 PROCEDURE — 99214 OFFICE O/P EST MOD 30 MIN: CPT | Performed by: FAMILY MEDICINE

## 2020-11-06 RX ORDER — HYOSCYAMINE SULFATE 0.12 MG/1
1 TABLET SUBLINGUAL 4 TIMES DAILY PRN
Qty: 30 EACH | Refills: 0 | Status: SHIPPED | OUTPATIENT
Start: 2020-11-06 | End: 2022-01-24

## 2020-11-06 NOTE — TELEPHONE ENCOUNTER
Please squeeze her in somewhere today for her UTI.  When she gets here please collect and run a urine specimen.

## 2020-11-06 NOTE — TELEPHONE ENCOUNTER
Patient called said she has a UTI again and hurts pretty bad, she wanted to come in today to see  but her schedule is full. She said if she has to go to the [facility] her deductible is high. Please advise

## 2020-11-06 NOTE — PROGRESS NOTES
Chief complaint: Burning with urination    Subjective     Barbara Carter is a 50 y.o. female. Patient here today complaining of burning during urination and hesitency, she believes her urine infection has returned. Severe pain made her leave work yesterday. Have had pain in suprbupic redion and right adnexa yesterday, better today. No hematuria She was evaluated on October 19 for similar symptoms at that time a UA was positive for leukocytes and nitrites and she was treated with amoxicillin, symptoms did resolve following antibiotics. NO hematuria, have had one kidney stone in the remote past. No vaginal discharge, do have chronic vaginal dryness. Took ASO, and percocet and used heating pad.     Additionally she is scheduled on Monday for routine follow-up on thyroid and other chronic medical conditions and would like to get labs today to have available for review at that time.    The following portions of the patient's history were reviewed and updated as appropriate: allergies, current medications, past family history, past medical history, past social history, past surgical history and problem list.    Current Outpatient Medications on File Prior to Visit   Medication Sig   • Artificial Saliva (SALIVAMAX) pack Take 1 packet by mouth Daily. Dissolve 1 packet in 30 oz of water up to 10x daily. Swish half of solution x 1 minute, spit, then repeat.   • aspirin (ASPIRIN ADULT LOW DOSE) 81 MG EC tablet Take 1 tablet by mouth Daily.   • Carboxymethylcellul-Glycerin (REFRESH OPTIVE) 0.5-0.9 % solution Apply 1 drop to eye(s) as directed by provider As Needed.   • cevimeline (EVOXAC) 30 MG capsule Take 1 capsule by mouth Every 8 (Eight) Hours.   • clonazePAM (KlonoPIN) 0.5 MG tablet Take 1 tablet by mouth Every 8 (Eight) Hours As Needed for Anxiety.   • conjugated estrogens (PREMARIN) 0.625 MG/GM vaginal cream Insert 1 application into the vagina 2 (Two) Times a Week.   • diclofenac (VOLTAREN) 1 % gel gel 2 gm to small  joints or 4 gm to large joints qid prn pain   • DULoxetine (Cymbalta) 60 MG capsule Take 1 capsule by mouth Daily.   • ergocalciferol (ERGOCALCIFEROL) 1.25 MG (06737 UT) capsule Take 1 capsule by mouth 2 (Two) Times a Week.   • fluticasone (FLONASE ALLERGY RELIEF) 50 MCG/ACT nasal spray 2 sprays into the nostril(s) as directed by provider Daily.   • gabapentin (NEURONTIN) 300 MG capsule Take 1 capsule by mouth 2 (Two) Times a Day. For burning   • hydroxychloroquine (PLAQUENIL) 200 MG tablet Take 2 tablets by mouth Daily.   • levothyroxine (Synthroid) 150 MCG tablet Take 1 tablet by mouth Daily.   • Lidocaine Viscous HCl (XYLOCAINE) 2 % solution Take 10 mL by mouth As Needed for Mild Pain . Dilute with water and gargle as needed   • metaxalone (SKELAXIN) 800 MG tablet Take 1 tablet by mouth Every 8 (Eight) Hours As Needed for Muscle Spasms.   • Multiple Vitamins-Minerals (CENTRUM ADULTS) tablet Take 1 tablet by mouth Daily.   • naloxone (NARCAN) 4 MG/0.1ML nasal spray 1 spray into the nostril(s) as directed by provider As Needed (narcotic overdose).   • omeprazole (priLOSEC) 40 MG capsule Take 1 capsule by mouth Daily.   • ondansetron (Zofran) 8 MG tablet Take 1 tablet by mouth Every 8 (Eight) Hours As Needed for Nausea or Vomiting.   • oxyCODONE-acetaminophen (Percocet) 5-325 MG per tablet Take 1 tablet by mouth Every 6 (Six) Hours As Needed for Severe Pain . 1 tablet q hs and q4h PRN severe pain   • traMADol (ULTRAM) 50 MG tablet Take 2 tablets by mouth Every 6 (Six) Hours As Needed for Moderate Pain  or Severe Pain .     No current facility-administered medications on file prior to visit.      Medications Discontinued During This Encounter   Medication Reason   • amoxicillin (AMOXIL) 500 MG capsule *Therapy completed   • fluconazole (Diflucan) 150 MG tablet *Therapy completed   • oxymetazoline (Afrin Nasal Spray) 0.05 % nasal spray *Therapy completed       Review of Systems   Constitutional: Positive for fatigue.  "Negative for activity change and fever.   Eyes: Negative for visual disturbance.   Respiratory: Negative for cough and shortness of breath.    Cardiovascular: Negative for chest pain, palpitations and leg swelling.   Gastrointestinal: Positive for abdominal pain (Lower abdomen more so on the right). Negative for constipation, diarrhea, nausea, vomiting and indigestion.   Genitourinary: Positive for dysuria, frequency and urgency. Negative for decreased urine volume, dyspareunia, flank pain, hematuria and vaginal discharge.   Skin: Negative for color change and rash.        Objective   Vitals:    11/06/20 1414   BP: 122/79   BP Location: Left arm   Patient Position: Sitting   Cuff Size: Adult   Pulse: 81   Temp: 98 °F (36.7 °C)   TempSrc: Infrared   SpO2: 96%   Weight: 97.5 kg (215 lb)   Height: 172.7 cm (68\")      Body mass index is 32.69 kg/m².    Physical Exam  Vitals signs and nursing note reviewed.   Constitutional:       General: She is not in acute distress.     Appearance: She is well-developed.   HENT:      Head: Normocephalic and atraumatic.   Cardiovascular:      Rate and Rhythm: Normal rate and regular rhythm.      Heart sounds: No murmur.   Pulmonary:      Effort: Pulmonary effort is normal.      Breath sounds: Normal breath sounds. No wheezing.   Abdominal:      Tenderness: There is abdominal tenderness ( Diffuse abdominal pain greatest in right lower quadrant). There is no right CVA tenderness, left CVA tenderness, guarding or rebound.   Musculoskeletal: Normal range of motion.   Skin:     General: Skin is warm and dry.      Findings: No rash.   Neurological:      Mental Status: She is alert and oriented to person, place, and time.         Lab Results   Component Value Date    GLU 97 11/06/2020    BUN 10 11/06/2020    CREATININE 0.98 11/06/2020    EGFRIFNONA 67 11/06/2020    EGFRIFAFRI 78 11/06/2020     11/06/2020    K 4.7 11/06/2020     11/06/2020    CALCIUM 9.7 11/06/2020    ALBUMIN 4.5 " 11/06/2020    BILITOT 0.2 11/06/2020    ALKPHOS 83 11/06/2020    AST 16 11/06/2020    ALT 17 11/06/2020    CHLPL 211 (H) 11/06/2020    TRIG 250 (H) 11/06/2020    HDL 39 (L) 11/06/2020    VLDL 44 (H) 11/06/2020     (H) 11/06/2020    WBC 3.6 11/06/2020    RBC 4.49 11/06/2020    HCT 40.8 11/06/2020    MCV 91 11/06/2020    MCH 31.4 11/06/2020    TSH 0.295 (L) 11/06/2020    FREET4 1.37 11/06/2020    JELV91IG 40.7 11/06/2020    URICACID 4.6 11/06/2020      No results found for: HGBA1C     Procedures     Assessment/Plan   Diagnoses and all orders for this visit:    1. Dysuria (Primary)  -     POC Urinalysis Dipstick, Automated  -     CBC & Differential  -     Uric Acid  -     Hyoscyamine Sulfate SL (Levsin/SL) 0.125 MG sublingual tablet; Place 1 tablet under the tongue 4 (Four) Times a Day As Needed (gut or bladder spasm).  Dispense: 30 each; Refill: 0  -     CT Abdomen Pelvis Stone Protocol; Future  -     Urine Culture - Urine, Urine, Clean Catch    2. Mixed hyperlipidemia  -     Comprehensive Metabolic Panel  -     Lipid Panel    3. Vitamin D deficiency  -     Vitamin D 25 Hydroxy    4. Acquired hypothyroidism  -     TSH  -     T4, Free  -     T3, Free    5. Encounter for hepatitis C screening test for low risk patient  -     Hepatitis C Antibody    6. Painful bladder spasm  -     CT Abdomen Pelvis Stone Protocol; Future      Recurrence of lower abdominal pain without current evidence of UTI on UA, sending for culture.  Starting hyoscyamine for bladder or possible gut spasm and checking CT renal stone protocol patient does have history of renal stones in the past as well as normal adenopathy.    Hypothyroidism checking labs and will adjust medication if needed.    History of hyperlipidemia checking lipid panel and CMP    History of vitamin D deficiency checking vitamin D level.    Medications Discontinued During This Encounter   Medication Reason   • amoxicillin (AMOXIL) 500 MG capsule *Therapy completed   •  fluconazole (Diflucan) 150 MG tablet *Therapy completed   • oxymetazoline (Afrin Nasal Spray) 0.05 % nasal spray *Therapy completed          Return in about 1 week (around 11/13/2020) for Recheck labs, pain, CT.    Education reference material relevant to visit available in AVS through [facility] or printed copy given.

## 2020-11-07 LAB
25(OH)D3+25(OH)D2 SERPL-MCNC: 40.7 NG/ML (ref 30–100)
ALBUMIN SERPL-MCNC: 4.5 G/DL (ref 3.8–4.8)
ALBUMIN/GLOB SERPL: 1.6 {RATIO} (ref 1.2–2.2)
ALP SERPL-CCNC: 83 IU/L (ref 39–117)
ALT SERPL-CCNC: 17 IU/L (ref 0–32)
AST SERPL-CCNC: 16 IU/L (ref 0–40)
BASOPHILS # BLD AUTO: 0 X10E3/UL (ref 0–0.2)
BASOPHILS NFR BLD AUTO: 1 %
BILIRUB SERPL-MCNC: 0.2 MG/DL (ref 0–1.2)
BUN SERPL-MCNC: 10 MG/DL (ref 6–24)
BUN/CREAT SERPL: 10 (ref 9–23)
CALCIUM SERPL-MCNC: 9.7 MG/DL (ref 8.7–10.2)
CHLORIDE SERPL-SCNC: 103 MMOL/L (ref 96–106)
CHOLEST SERPL-MCNC: 211 MG/DL (ref 100–199)
CO2 SERPL-SCNC: 28 MMOL/L (ref 20–29)
CREAT SERPL-MCNC: 0.98 MG/DL (ref 0.57–1)
EOSINOPHIL # BLD AUTO: 0.1 X10E3/UL (ref 0–0.4)
EOSINOPHIL NFR BLD AUTO: 3 %
ERYTHROCYTE [DISTWIDTH] IN BLOOD BY AUTOMATED COUNT: 11.7 % (ref 11.7–15.4)
GLOBULIN SER CALC-MCNC: 2.8 G/DL (ref 1.5–4.5)
GLUCOSE SERPL-MCNC: 97 MG/DL (ref 65–99)
HCT VFR BLD AUTO: 40.8 % (ref 34–46.6)
HCV AB S/CO SERPL IA: <0.1 S/CO RATIO (ref 0–0.9)
HDLC SERPL-MCNC: 39 MG/DL
HGB BLD-MCNC: 14.1 G/DL (ref 11.1–15.9)
IMM GRANULOCYTES # BLD AUTO: 0 X10E3/UL (ref 0–0.1)
IMM GRANULOCYTES NFR BLD AUTO: 0 %
LDLC SERPL CALC-MCNC: 128 MG/DL (ref 0–99)
LYMPHOCYTES # BLD AUTO: 1.3 X10E3/UL (ref 0.7–3.1)
LYMPHOCYTES NFR BLD AUTO: 36 %
MCH RBC QN AUTO: 31.4 PG (ref 26.6–33)
MCHC RBC AUTO-ENTMCNC: 34.6 G/DL (ref 31.5–35.7)
MCV RBC AUTO: 91 FL (ref 79–97)
MONOCYTES # BLD AUTO: 0.5 X10E3/UL (ref 0.1–0.9)
MONOCYTES NFR BLD AUTO: 13 %
NEUTROPHILS # BLD AUTO: 1.8 X10E3/UL (ref 1.4–7)
NEUTROPHILS NFR BLD AUTO: 47 %
PLATELET # BLD AUTO: 320 X10E3/UL (ref 150–450)
POTASSIUM SERPL-SCNC: 4.7 MMOL/L (ref 3.5–5.2)
PROT SERPL-MCNC: 7.3 G/DL (ref 6–8.5)
RBC # BLD AUTO: 4.49 X10E6/UL (ref 3.77–5.28)
SODIUM SERPL-SCNC: 144 MMOL/L (ref 134–144)
T3FREE SERPL-MCNC: 3.7 PG/ML (ref 2–4.4)
T4 FREE SERPL-MCNC: 1.37 NG/DL (ref 0.82–1.77)
TRIGL SERPL-MCNC: 250 MG/DL (ref 0–149)
TSH SERPL DL<=0.005 MIU/L-ACNC: 0.29 UIU/ML (ref 0.45–4.5)
URATE SERPL-MCNC: 4.6 MG/DL (ref 2.5–7.1)
VLDLC SERPL CALC-MCNC: 44 MG/DL (ref 5–40)
WBC # BLD AUTO: 3.6 X10E3/UL (ref 3.4–10.8)

## 2020-11-09 LAB
BACTERIA UR CULT: ABNORMAL
BACTERIA UR CULT: ABNORMAL
OTHER ANTIBIOTIC SUSC ISLT: ABNORMAL

## 2020-11-10 DIAGNOSIS — R30.1 PAINFUL BLADDER SPASM: ICD-10-CM

## 2020-11-10 DIAGNOSIS — R30.0 DYSURIA: ICD-10-CM

## 2020-11-12 DIAGNOSIS — B95.2 UTI (URINARY TRACT INFECTION) DUE TO ENTEROCOCCUS: ICD-10-CM

## 2020-11-12 DIAGNOSIS — B95.2 ENTEROCOCCUS FAECALIS INFECTION: Primary | ICD-10-CM

## 2020-11-12 DIAGNOSIS — N39.0 UTI (URINARY TRACT INFECTION) DUE TO ENTEROCOCCUS: ICD-10-CM

## 2020-11-12 RX ORDER — NITROFURANTOIN 25; 75 MG/1; MG/1
100 CAPSULE ORAL 2 TIMES DAILY
Qty: 14 CAPSULE | Refills: 0 | Status: SHIPPED | OUTPATIENT
Start: 2020-11-12 | End: 2020-11-25

## 2020-11-12 NOTE — PROGRESS NOTES
Please inform patient urine culture did grow Enterococcus faecalis.  I am sending in a new prescription for antibiotics, a medication called Macrobid to [facility] pharmacy.  Please complete the prescription and contact office if symptoms recur.

## 2020-11-17 DIAGNOSIS — M50.123 CERVICAL DISC DISORDER AT C6-C7 LEVEL WITH RADICULOPATHY: ICD-10-CM

## 2020-11-17 DIAGNOSIS — M79.7 FIBROMYALGIA: ICD-10-CM

## 2020-11-17 DIAGNOSIS — M54.2 NECK PAIN: ICD-10-CM

## 2020-11-17 DIAGNOSIS — G89.4 CHRONIC PAIN SYNDROME: ICD-10-CM

## 2020-11-17 DIAGNOSIS — K21.9 GASTROESOPHAGEAL REFLUX DISEASE, UNSPECIFIED WHETHER ESOPHAGITIS PRESENT: ICD-10-CM

## 2020-11-17 RX ORDER — OMEPRAZOLE 40 MG/1
40 CAPSULE, DELAYED RELEASE ORAL DAILY
Qty: 90 CAPSULE | Refills: 0 | Status: SHIPPED | OUTPATIENT
Start: 2020-11-17 | End: 2021-03-08 | Stop reason: SDUPTHER

## 2020-11-17 RX ORDER — TRAMADOL HYDROCHLORIDE 50 MG/1
100 TABLET ORAL EVERY 6 HOURS PRN
Qty: 360 TABLET | Refills: 1 | Status: SHIPPED | OUTPATIENT
Start: 2020-11-17 | End: 2021-02-17 | Stop reason: SDUPTHER

## 2020-11-25 ENCOUNTER — OFFICE VISIT (OUTPATIENT)
Dept: FAMILY MEDICINE CLINIC | Facility: CLINIC | Age: 50
End: 2020-11-25

## 2020-11-25 VITALS
WEIGHT: 215 LBS | HEART RATE: 75 BPM | TEMPERATURE: 98 F | HEIGHT: 68 IN | DIASTOLIC BLOOD PRESSURE: 81 MMHG | OXYGEN SATURATION: 97 % | SYSTOLIC BLOOD PRESSURE: 128 MMHG | BODY MASS INDEX: 32.58 KG/M2

## 2020-11-25 DIAGNOSIS — E03.9 ACQUIRED HYPOTHYROIDISM: ICD-10-CM

## 2020-11-25 DIAGNOSIS — Z12.31 ENCOUNTER FOR SCREENING MAMMOGRAM FOR BREAST CANCER: ICD-10-CM

## 2020-11-25 DIAGNOSIS — G89.4 CHRONIC PAIN SYNDROME: ICD-10-CM

## 2020-11-25 DIAGNOSIS — R10.84 GENERALIZED ABDOMINAL PAIN: ICD-10-CM

## 2020-11-25 DIAGNOSIS — Z78.0 MENOPAUSE: ICD-10-CM

## 2020-11-25 DIAGNOSIS — E78.2 MIXED HYPERLIPIDEMIA: ICD-10-CM

## 2020-11-25 DIAGNOSIS — R30.1 PAINFUL BLADDER SPASM: Primary | ICD-10-CM

## 2020-11-25 DIAGNOSIS — M79.7 FIBROMYALGIA: ICD-10-CM

## 2020-11-25 DIAGNOSIS — R10.31 RIGHT LOWER QUADRANT PAIN: ICD-10-CM

## 2020-11-25 DIAGNOSIS — Z12.11 ENCOUNTER FOR SCREENING FOR MALIGNANT NEOPLASM OF COLON: ICD-10-CM

## 2020-11-25 DIAGNOSIS — Z00.00 ANNUAL PHYSICAL EXAM: ICD-10-CM

## 2020-11-25 DIAGNOSIS — K59.00 CONSTIPATION, UNSPECIFIED CONSTIPATION TYPE: ICD-10-CM

## 2020-11-25 LAB
BILIRUB BLD-MCNC: NEGATIVE MG/DL
CLARITY, POC: CLEAR
COLOR UR: YELLOW
GLUCOSE UR STRIP-MCNC: NEGATIVE MG/DL
KETONES UR QL: NEGATIVE
LEUKOCYTE EST, POC: NEGATIVE
NITRITE UR-MCNC: NEGATIVE MG/ML
PH UR: 6 [PH] (ref 5–8)
PROT UR STRIP-MCNC: NEGATIVE MG/DL
RBC # UR STRIP: NEGATIVE /UL
SP GR UR: 1.03 (ref 1–1.03)
UROBILINOGEN UR QL: NORMAL

## 2020-11-25 PROCEDURE — 99396 PREV VISIT EST AGE 40-64: CPT | Performed by: FAMILY MEDICINE

## 2020-11-25 PROCEDURE — 81003 URINALYSIS AUTO W/O SCOPE: CPT | Performed by: FAMILY MEDICINE

## 2020-11-25 PROCEDURE — 99214 OFFICE O/P EST MOD 30 MIN: CPT | Performed by: FAMILY MEDICINE

## 2020-11-25 RX ORDER — ALUMINUM ZIRCONIUM OCTACHLOROHYDREX GLY 16 G/100G
GEL TOPICAL
Qty: 425 G | Refills: 12
Start: 2020-11-25

## 2020-11-25 RX ORDER — CONJUGATED ESTROGENS 0.62 MG/G
CREAM VAGINAL 2 TIMES WEEKLY
Qty: 30 G | Refills: 12 | Status: SHIPPED | OUTPATIENT
Start: 2020-11-26

## 2020-11-25 NOTE — PATIENT INSTRUCTIONS
Preventing High Cholesterol  Cholesterol is a white, waxy substance similar to fat that the human body needs to help build cells. The liver makes all the cholesterol that a person's body needs. Having high cholesterol (hypercholesterolemia) increases a person's risk for heart disease and stroke. Extra (excess) cholesterol comes from the food the person eats.  High cholesterol can often be prevented with diet and lifestyle changes. If you already have high cholesterol, you can control it with diet and lifestyle changes and with medicine.  How can high cholesterol affect me?  If you have high cholesterol, deposits (plaques) may build up on the walls of your arteries. The arteries are the blood vessels that carry blood away from your heart.  Plaques make the arteries narrower and stiffer. This can limit or block blood flow and cause blood clots to form. Blood clots:  · Are tiny balls of cells that form in your blood.  · Can move to the heart or brain, causing a heart attack or stroke.  Plaques in arteries greatly increase your risk for heart attack and stroke.Making diet and lifestyle changes can reduce your risk for these conditions that may threaten your life.  What can increase my risk?  This condition is more likely to develop in people who:  · Eat foods that are high in saturated fat or cholesterol. Saturated fat is mostly found in:  ? Foods that contain animal fat, such as red meat and some dairy products.  ? Certain fatty foods made from plants, such as tropical oils.  · Are overweight.  · Are not getting enough exercise.  · Have a family history of high cholesterol.  What actions can I take to prevent this?  Nutrition    · Eat less saturated fat.  · Avoid trans fats (partially hydrogenated oils). These are often found in margarine and in some baked goods, fried foods, and snacks bought in packages.  · Avoid precooked or cured meat, such as sausages or meat loaves.  · Avoid foods and drinks that have added  sugars.  · Eat more fruits, vegetables, and whole grains.  · Choose healthy sources of protein, such as fish, poultry, lean cuts of red meat, beans, peas, lentils, and nuts.  · Choose healthy sources of fat, such as:  ? Nuts.  ? Vegetable oils, especially olive oil.  ? Fish that have healthy fats (omega-3 fatty acids), such as mackerel or salmon.  The items listed above may not be a complete list of recommended foods and beverages. Contact a dietitian for more information.  Lifestyle  · Lose weight if you are overweight. Losing 5-10 lb (2.3-4.5 kg) can help prevent or control high cholesterol. It can also lower your risk for diabetes and high blood pressure. Ask your health care provider to help you with a diet and exercise plan to lose weight safely.  · Do not use any products that contain nicotine or tobacco, such as cigarettes, e-cigarettes, and chewing tobacco. If you need help quitting, ask your health care provider.  · Limit your alcohol intake.  ? Do not drink alcohol if:  § Your health care provider tells you not to drink.  § You are pregnant, may be pregnant, or are planning to become pregnant.  ? If you drink alcohol:  § Limit how much you use to:  § 0-1 drink a day for women.  § 0-2 drinks a day for men.  § Be aware of how much alcohol is in your drink. In the [place] one drink equals one 12 oz bottle of beer (355 mL), one 5 oz glass of wine (148 mL), or one 1½ oz glass of hard liquor (44 mL).  Activity    · Get enough exercise. Each week, do at least 150 minutes of exercise that takes a medium level of effort (moderate-intensity exercise).  ? This is exercise that:  § Makes your heart beat faster and makes you breathe harder than usual.  § Allows you to still be able to talk.  ? You could exercise in short sessions several times a day or longer sessions a few times a week. For example, on 5 days each week, you could walk fast or ride your bike 3 times a day for 10 minutes each time.  · Do exercises as told  by your health care provider.  Medicines  · In addition to diet and lifestyle changes, your health care provider may recommend medicines to help lower cholesterol. This may be a medicine to lower the amount of cholesterol your liver makes. You may need medicine if:  ? Diet and lifestyle changes do not lower your cholesterol enough.  ? You have high cholesterol and other risk factors for heart disease or stroke.  · Take over-the-counter and prescription medicines only as told by your health care provider.  General information  · Manage your risk factors for high cholesterol. Talk with your health care provider about all your risk factors and how to lower your risk.  · Manage other conditions that you have, such as diabetes or high blood pressure (hypertension).  · Have blood tests to check your cholesterol levels at regular points in time as told by your health care provider.  · Keep all follow-up visits as told by your health care provider. This is important.  Where to find more information  · American Heart Association: www.heart.org  · National Heart, Lung, and Blood [place]: www.nhlbi.nih.gov  Summary  · High cholesterol increases your risk for heart disease and stroke. By keeping your cholesterol level low, you can reduce your risk for these conditions.  · High cholesterol can often be prevented with diet and lifestyle changes.  · Work with your health care provider to manage your risk factors, and have your blood tested regularly.  This information is not intended to replace advice given to you by your health care provider. Make sure you discuss any questions you have with your health care provider.  Document Revised: 04/10/2020 Document Reviewed: 08/26/2017  [employer] Patient Education © 2020 [employer].    Fat and Cholesterol Restricted Eating Plan  Eating a diet that limits fat and cholesterol may help lower your risk for heart disease and other conditions. Your body needs fat and cholesterol for basic  functions, but eating too much of these things can be harmful to your health.  Your health care provider may order lab tests to check your blood fat (lipid) and cholesterol levels. This helps your health care provider understand your risk for certain conditions and whether you need to make diet changes. Work with your health care provider or dietitian to make an eating plan that is right for you.  Your plan includes:  · Limit your fat intake to ______% or less of your total calories a day.  · Limit your saturated fat intake to ______% or less of your total calories a day.  · Limit the amount of cholesterol in your diet to less than _________mg a day.  · Eat ___________ g of fiber a day.  What are tips for following this plan?  General guidelines    · If you are overweight, work with your health care provider to lose weight safely. Losing just 5-10% of your body weight can improve your overall health and help prevent diseases such as diabetes and heart disease.  · Avoid:  ? Foods with added sugar.  ? Fried foods.  ? Foods that contain partially hydrogenated oils, including stick margarine, some tub margarines, cookies, crackers, and other baked goods.  · Limit alcohol intake to no more than 1 drink a day for nonpregnant women and 2 drinks a day for men. One drink equals 12 oz of beer, 5 oz of wine, or 1½ oz of hard liquor.  Reading food labels  · Check food labels for:  ? Trans fats, partially hydrogenated oils, or high amounts of saturated fat. Avoid foods that contain saturated fat and trans fat.  ? The amount of cholesterol in each serving. Try to eat no more than 200 mg of cholesterol each day.  ? The amount of fiber in each serving. Try to eat at least 20-30 g of fiber each day.  · Choose foods with healthy fats, such as:  ? Monounsaturated and polyunsaturated fats. These include olive and canola oil, flaxseeds, walnuts, almonds, and seeds.  ? Omega-3 fats. These are found in foods such as salmon, mackerel,  "sardines, tuna, flaxseed oil, and ground flaxseeds.  · Choose grain products that have whole grains. Look for the word \"whole\" as the first word in the ingredient list.  Cooking  · Cook foods using methods other than frying. Baking, boiling, grilling, and broiling are some healthy options.  · Eat more home-cooked food and less restaurant, buffet, and fast food.  · Avoid cooking using saturated fats.  ? Animal sources of saturated fats include meats, butter, and cream.  ? Plant sources of saturated fats include palm oil, palm kernel oil, and coconut oil.  Meal planning    · At meals, imagine dividing your plate into fourths:  ? Fill one-half of your plate with vegetables and green salads.  ? Fill one-fourth of your plate with whole grains.  ? Fill one-fourth of your plate with lean protein foods.  · Eat fish that is high in omega-3 fats at least two times a week.  · Eat more foods that contain fiber, such as whole grains, beans, apples, broccoli, carrots, peas, and barley. These foods help promote healthy cholesterol levels in the blood.  Recommended foods  Grains  · Whole grains, such as whole wheat or whole grain breads, crackers, cereals, and pasta. Unsweetened oatmeal, bulgur, barley, quinoa, or brown rice. Corn or whole wheat flour tortillas.  Vegetables  · Fresh or frozen vegetables (raw, steamed, roasted, or grilled). Green salads.  Fruits  · All fresh, canned (in natural juice), or frozen fruits.  Meats and other protein foods  · Ground beef (85% or leaner), grass-fed beef, or beef trimmed of fat. Skinless chicken or turkey. Ground chicken or turkey. Pork trimmed of fat. All fish and seafood. Egg whites. Dried beans, peas, or lentils. Unsalted nuts or seeds. Unsalted canned beans. Natural nut butters without added sugar and oil.  Dairy  · Low-fat or nonfat dairy products, such as skim or 1% milk, 2% or reduced-fat cheeses, low-fat and fat-free ricotta or cottage cheese, or plain low-fat and nonfat " yogurt.  Fats and oils  · Tub margarine without trans fats. Light or reduced-fat mayonnaise and salad dressings. Avocado. Olive, canola, sesame, or safflower oils.  The items listed above may not be a complete list of recommended foods or beverages. Contact your dietitian for more options.  Foods to avoid  Grains  · White bread. White pasta. White rice. Cornbread. Bagels, pastries, and croissants. Crackers and snack foods that contain trans fat and hydrogenated oils.  Vegetables  · Vegetables cooked in cheese, cream, or butter sauce. Fried vegetables.  Fruits  · Canned fruit in heavy syrup. Fruit in cream or butter sauce. Fried fruit.  Meats and other protein foods  · Fatty cuts of meat. Ribs, chicken wings, richardson, sausage, bologna, salami, chitterlings, fatback, hot dogs, bratwurst, and packaged lunch meats. Liver and organ meats. Whole eggs and egg yolks. Chicken and turkey with skin. Fried meat.  Dairy  · Whole or 2% milk, cream, half-and-half, and cream cheese. Whole milk cheeses. Whole-fat or sweetened yogurt. Full-fat cheeses. Nondairy creamers and whipped toppings. Processed cheese, cheese spreads, and cheese curds.  Beverages  · Alcohol. Sugar-sweetened drinks such as sodas, lemonade, and fruit drinks.  Fats and oils  · Butter, stick margarine, lard, shortening, ghee, or richardson fat. Coconut, palm kernel, and palm oils.  Sweets and desserts  · Corn syrup, sugars, honey, and molasses. Candy. Jam and jelly. Syrup. Sweetened cereals. Cookies, pies, cakes, donuts, muffins, and ice cream.  The items listed above may not be a complete list of foods and beverages to avoid. Contact your dietitian for more information.  Summary  · Your body needs fat and cholesterol for basic functions. However, eating too much of these things can be harmful to your health.  · Work with your health care provider and dietitian to follow a diet low in fat and cholesterol. Doing this may help lower your risk for heart disease and other  conditions.  · Choose healthy fats, such as monounsaturated and polyunsaturated fats, and foods high in omega-3 fatty acids.  · Eat fiber-rich foods, such as whole grains, beans, peas, fruits, and vegetables.  · Limit or avoid alcohol, fried foods, and foods high in saturated fats, partially hydrogenated oils, and sugar.  This information is not intended to replace advice given to you by your health care provider. Make sure you discuss any questions you have with your health care provider.  Document Revised: 11/30/2018 Document Reviewed: 09/04/2018  Elsevier Patient Education © 2020 [email or web address]    Cholesterol Content in Foods  Cholesterol is a waxy, fat-like substance that helps to carry fat in the blood. The body needs cholesterol in small amounts, but too much cholesterol can cause damage to the arteries and heart.  Most people should eat less than 200 milligrams (mg) of cholesterol a day.  Foods with cholesterol    Cholesterol is found in animal-based foods, such as meat, seafood, and dairy. Generally, low-fat dairy and lean meats have less cholesterol than full-fat dairy and fatty meats. The milligrams of cholesterol per serving (mg per serving) of common cholesterol-containing foods are listed below.  Meat and other proteins  · Egg -- one large whole egg has 186 mg.  · Veal shank -- 4 oz has 141 mg.  · Lean ground turkey (93% lean) -- 4 oz has 118 mg.  · Fat-trimmed lamb loin -- 4 oz has 106 mg.  · Lean ground beef (90% lean) -- 4 oz has 100 mg.  · Lobster -- 3.5 oz has 90 mg.  · Pork loin chops -- 4 oz has 86 mg.  · Canned salmon -- 3.5 oz has 83 mg.  · Fat-trimmed beef top loin -- 4 oz has 78 mg.  · Frankfurter -- 1 randi (3.5 oz) has 77 mg.  · Crab -- 3.5 oz has 71 mg.  · Roasted chicken without skin, white meat -- 4 oz has 66 mg.  · Light bologna -- 2 oz has 45 mg.  · Deli-cut turkey -- 2 oz has 31 mg.  · Canned tuna -- 3.5 oz has 31 mg.  · Cavazos -- 1 oz has 29 mg.  · Oysters and mussels (raw) -- 3.5 oz  has 25 mg.  · Mackerel -- 1 oz has 22 mg.  · Trout -- 1 oz has 20 mg.  · Pork sausage -- 1 link (1 oz) has 17 mg.  · [place] -- 1 oz has 16 mg.  · Tilapia -- 1 oz has 14 mg.  Dairy  · Soft-serve ice cream -- ½ cup (4 oz) has 103 mg.  · Whole-milk yogurt -- 1 cup (8 oz) has 29 mg.  · Cheddar cheese -- 1 oz has 28 mg.  · American cheese -- 1 oz has 28 mg.  · Whole milk -- 1 cup (8 oz) has 23 mg.  · 2% milk -- 1 cup (8 oz) has 18 mg.  · Cream cheese -- 1 tablespoon (Tbsp) has 15 mg.  · Cottage cheese -- ½ cup (4 oz) has 14 mg.  · Low-fat (1%) milk -- 1 cup (8 oz) has 10 mg.  · Sour cream -- 1 Tbsp has 8.5 mg.  · Low-fat yogurt -- 1 cup (8 oz) has 8 mg.  · Nonfat Greek yogurt -- 1 cup (8 oz) has 7 mg.  · Half-and-half cream -- 1 Tbsp has 5 mg.  Fats and oils  · Cod liver oil -- 1 tablespoon (Tbsp) has 82 mg.  · Butter -- 1 Tbsp has 15 mg.  · Lard -- 1 Tbsp has 14 mg.  · Cavazos grease -- 1 Tbsp has 14 mg.  · Mayonnaise -- 1 Tbsp has 5-10 mg.  · Margarine -- 1 Tbsp has 3-10 mg.  Exact amounts of cholesterol in these foods may vary depending on specific ingredients and brands.  Foods without cholesterol  Most plant-based foods do not have cholesterol unless you combine them with a food that has cholesterol. Foods without cholesterol include:  · Grains and cereals.  · Vegetables.  · Fruits.  · Vegetable oils, such as olive, canola, and sunflower oil.  · Legumes, such as peas, beans, and lentils.  · Nuts and seeds.  · Egg whites.  Summary  · The body needs cholesterol in small amounts, but too much cholesterol can cause damage to the arteries and heart.  · Most people should eat less than 200 milligrams (mg) of cholesterol a day.  This information is not intended to replace advice given to you by your health care provider. Make sure you discuss any questions you have with your health care provider.  Document Revised: 11/30/2018 Document Reviewed: 08/14/2018  Elsevier Patient Education © 2020 Elsevier Inc.    Preventive Care  40-64 Years Old, Female  Preventive care refers to visits with your health care provider and lifestyle choices that can promote health and wellness. This includes:  · A yearly physical exam. This may also be called an annual well check.  · Regular dental visits and eye exams.  · Immunizations.  · Screening for certain conditions.  · Healthy lifestyle choices, such as eating a healthy diet, getting regular exercise, not using drugs or products that contain nicotine and tobacco, and limiting alcohol use.  What can I expect for my preventive care visit?  Physical exam  Your health care provider will check your:  · Height and weight. This may be used to calculate body mass index (BMI), which tells if you are at a healthy weight.  · Heart rate and blood pressure.  · Skin for abnormal spots.  Counseling  Your health care provider may ask you questions about your:  · Alcohol, tobacco, and drug use.  · Emotional well-being.  · Home and relationship well-being.  · Sexual activity.  · Eating habits.  · Work and work environment.  · Method of birth control.  · Menstrual cycle.  · Pregnancy history.  What immunizations do I need?    Influenza (flu) vaccine  · This is recommended every year.  Tetanus, diphtheria, and pertussis (Tdap) vaccine  · You may need a Td booster every 10 years.  Varicella (chickenpox) vaccine  · You may need this if you have not been vaccinated.  Zoster (shingles) vaccine  · You may need this after age 60.  Measles, mumps, and rubella (MMR) vaccine  · You may need at least one dose of MMR if you were born in 1957 or later. You may also need a second dose.  Pneumococcal conjugate (PCV13) vaccine  · You may need this if you have certain conditions and were not previously vaccinated.  Pneumococcal polysaccharide (PPSV23) vaccine  · You may need one or two doses if you smoke cigarettes or if you have certain conditions.  Meningococcal conjugate (MenACWY) vaccine  · You may need this if you have certain  conditions.  Hepatitis A vaccine  · You may need this if you have certain conditions or if you travel or work in places where you may be exposed to hepatitis A.  Hepatitis B vaccine  · You may need this if you have certain conditions or if you travel or work in places where you may be exposed to hepatitis B.  Haemophilus influenzae type b (Hib) vaccine  · You may need this if you have certain conditions.  Human papillomavirus (HPV) vaccine  · If recommended by your health care provider, you may need three doses over 6 months.  You may receive vaccines as individual doses or as more than one vaccine together in one shot (combination vaccines). Talk with your health care provider about the risks and benefits of combination vaccines.  What tests do I need?  Blood tests  · Lipid and cholesterol levels. These may be checked every 5 years, or more frequently if you are over 50 years old.  · Hepatitis C test.  · Hepatitis B test.  Screening  · Lung cancer screening. You may have this screening every year starting at age 55 if you have a 30-pack-year history of smoking and currently smoke or have quit within the past 15 years.  · Colorectal cancer screening. All adults should have this screening starting at age 50 and continuing until age 75. Your health care provider may recommend screening at age 45 if you are at increased risk. You will have tests every 1-10 years, depending on your results and the type of screening test.  · Diabetes screening. This is done by checking your blood sugar (glucose) after you have not eaten for a while (fasting). You may have this done every 1-3 years.  · Mammogram. This may be done every 1-2 years. Talk with your health care provider about when you should start having regular mammograms. This may depend on whether you have a family history of breast cancer.  · BRCA-related cancer screening. This may be done if you have a family history of breast, ovarian, tubal, or peritoneal  cancers.  · Pelvic exam and Pap test. This may be done every 3 years starting at age 21. Starting at age 30, this may be done every 5 years if you have a Pap test in combination with an HPV test.  Other tests  · Sexually transmitted disease (STD) testing.  · Bone density scan. This is done to screen for osteoporosis. You may have this scan if you are at high risk for osteoporosis.  Follow these instructions at home:  Eating and drinking  · Eat a diet that includes fresh fruits and vegetables, whole grains, lean protein, and low-fat dairy.  · Take vitamin and mineral supplements as recommended by your health care provider.  · Do not drink alcohol if:  ? Your health care provider tells you not to drink.  ? You are pregnant, may be pregnant, or are planning to become pregnant.  · If you drink alcohol:  ? Limit how much you have to 0-1 drink a day.  ? Be aware of how much alcohol is in your drink. In the [place] one drink equals one 12 oz bottle of beer (355 mL), one 5 oz glass of wine (148 mL), or one 1½ oz glass of hard liquor (44 mL).  Lifestyle  · Take daily care of your teeth and gums.  · Stay active. Exercise for at least 30 minutes on 5 or more days each week.  · Do not use any products that contain nicotine or tobacco, such as cigarettes, e-cigarettes, and chewing tobacco. If you need help quitting, ask your health care provider.  · If you are sexually active, practice safe sex. Use a condom or other form of birth control (contraception) in order to prevent pregnancy and STIs (sexually transmitted infections).  · If told by your health care provider, take low-dose aspirin daily starting at age 50.  What's next?  · Visit your health care provider once a year for a well check visit.  · Ask your health care provider how often you should have your eyes and teeth checked.  · Stay up to date on all vaccines.  This information is not intended to replace advice given to you by your health care provider. Make sure you  discuss any questions you have with your health care provider.  Document Revised: 08/29/2019 Document Reviewed: 08/29/2019  [employer] Patient Education © 2020 [employer]    Constipation, Adult  Constipation is when a person has fewer bowel movements in a week than normal, has difficulty having a bowel movement, or has stools that are dry, hard, or larger than normal. Constipation may be caused by an underlying condition. It may become worse with age if a person takes certain medicines and does not take in enough fluids.  Follow these instructions at home:  Eating and drinking    · Eat foods that have a lot of fiber, such as fresh fruits and vegetables, whole grains, and beans.  · Limit foods that are high in fat, low in fiber, or overly processed, such as french fries, hamburgers, cookies, candies, and soda.  · Drink enough fluid to keep your urine clear or pale yellow.  General instructions  · Exercise regularly or as told by your health care provider.  · Go to the restroom when you have the urge to go. Do not hold it in.  · Take over-the-counter and prescription medicines only as told by your health care provider. These include any fiber supplements.  · Practice pelvic floor retraining exercises, such as deep breathing while relaxing the lower abdomen and pelvic floor relaxation during bowel movements.  · Watch your condition for any changes.  · Keep all follow-up visits as told by your health care provider. This is important.  Contact a health care provider if:  · You have pain that gets worse.  · You have a fever.  · You do not have a bowel movement after 4 days.  · You vomit.  · You are not hungry.  · You lose weight.  · You are bleeding from the anus.  · You have thin, pencil-like stools.  Get help right away if:  · You have a fever and your symptoms suddenly get worse.  · You leak stool or have blood in your stool.  · Your abdomen is bloated.  · You have severe pain in your abdomen.  · You feel dizzy or you  faint.  This information is not intended to replace advice given to you by your health care provider. Make sure you discuss any questions you have with your health care provider.  Document Revised: 11/30/2018 Document Reviewed: 06/07/2017  Elsevier Patient Education © 2020 [employer] Inc.

## 2020-11-25 NOTE — PROGRESS NOTES
Chief Complaint   Patient presents with   • Pain   • review ct and lab results       Subjective     Barbara Carter is a 50 y.o. female.  Patient here to follow up on culture positive E. coli UTI (following completely negative UA), abdominal pain and muscle spasms, and hypothyroidism.    She did complete Macrobid.  States to still not feel completely back to normal and would like to review CT results and lab results. Still having pain in right adnexa intermitatly but seems to have subsided since last flair 2 days ago. Have been out of premarin cream for several months. Last pap 7/31/19 was negative.  Last Period 2012, age 41   Have had colonoscopy years ago.   Vaginal dryness, itching irritation no discharge, took diflucan on Monday sx actually worse. Increase irrtaion and burning following urination. Minimal dysuria.     Will ocassionaly use metamucil or mirilax if feel constipated.      The following portions of the patient's history were reviewed and updated as appropriate: allergies, current medications, past family history, past medical history, past social history, past surgical history and problem list.    Current Outpatient Medications on File Prior to Visit   Medication Sig   • Artificial Saliva (SALIVAMAX) pack Take 1 packet by mouth Daily. Dissolve 1 packet in 30 oz of water up to 10x daily. Swish half of solution x 1 minute, spit, then repeat.   • aspirin (ASPIRIN ADULT LOW DOSE) 81 MG EC tablet Take 1 tablet by mouth Daily.   • Carboxymethylcellul-Glycerin (REFRESH OPTIVE) 0.5-0.9 % solution Apply 1 drop to eye(s) as directed by provider As Needed.   • cevimeline (EVOXAC) 30 MG capsule Take 1 capsule by mouth Every 8 (Eight) Hours.   • clonazePAM (KlonoPIN) 0.5 MG tablet Take 1 tablet by mouth Every 8 (Eight) Hours As Needed for Anxiety.   • diclofenac (VOLTAREN) 1 % gel gel 2 gm to small joints or 4 gm to large joints qid prn pain   • DULoxetine (Cymbalta) 60 MG capsule Take 1 capsule by mouth Daily.    • ergocalciferol (ERGOCALCIFEROL) 1.25 MG (25910 UT) capsule Take 1 capsule by mouth 2 (Two) Times a Week.   • fluticasone (FLONASE ALLERGY RELIEF) 50 MCG/ACT nasal spray 2 sprays into the nostril(s) as directed by provider Daily.   • gabapentin (NEURONTIN) 300 MG capsule Take 1 capsule by mouth 2 (Two) Times a Day. For burning   • hydroxychloroquine (PLAQUENIL) 200 MG tablet Take 2 tablets by mouth Daily.   • Hyoscyamine Sulfate SL (Levsin/SL) 0.125 MG sublingual tablet Place 1 tablet under the tongue 4 (Four) Times a Day As Needed (gut or bladder spasm).   • levothyroxine (Synthroid) 150 MCG tablet Take 1 tablet by mouth Daily.   • Lidocaine Viscous HCl (XYLOCAINE) 2 % solution Take 10 mL by mouth As Needed for Mild Pain . Dilute with water and gargle as needed   • metaxalone (SKELAXIN) 800 MG tablet Take 1 tablet by mouth Every 8 (Eight) Hours As Needed for Muscle Spasms.   • Multiple Vitamins-Minerals (CENTRUM ADULTS) tablet Take 1 tablet by mouth Daily.   • naloxone (NARCAN) 4 MG/0.1ML nasal spray 1 spray into the nostril(s) as directed by provider As Needed (narcotic overdose).   • omeprazole (priLOSEC) 40 MG capsule Take 1 capsule by mouth Daily.   • ondansetron (Zofran) 8 MG tablet Take 1 tablet by mouth Every 8 (Eight) Hours As Needed for Nausea or Vomiting.   • oxyCODONE-acetaminophen (Percocet) 5-325 MG per tablet Take 1 tablet by mouth Every 6 (Six) Hours As Needed for Severe Pain . 1 tablet q hs and q4h PRN severe pain   • traMADol (ULTRAM) 50 MG tablet Take 2 tablets by mouth Every 6 (Six) Hours As Needed for Moderate Pain  or Severe Pain .   • [DISCONTINUED] conjugated estrogens (PREMARIN) 0.625 MG/GM vaginal cream Insert 1 application into the vagina 2 (Two) Times a Week.   • [DISCONTINUED] nitrofurantoin, macrocrystal-monohydrate, (Macrobid) 100 MG capsule Take 1 capsule by mouth 2 (Two) Times a Day.     No current facility-administered medications on file prior to visit.      Medications  "Discontinued During This Encounter   Medication Reason   • nitrofurantoin, macrocrystal-monohydrate, (Macrobid) 100 MG capsule *Therapy completed   • conjugated estrogens (PREMARIN) 0.625 MG/GM vaginal cream Reorder       Review of Systems   Constitutional: Positive for fatigue. Negative for activity change and fever.   Eyes: Negative for visual disturbance.   Respiratory: Negative for cough and shortness of breath.    Cardiovascular: Negative for chest pain, palpitations and leg swelling.   Gastrointestinal: Positive for abdominal pain (Right lower abdomen inside hip bone) and constipation ( Occasional). Negative for diarrhea, nausea, vomiting and indigestion.   Genitourinary: Positive for amenorrhea. Negative for decreased urine volume, difficulty urinating, dyspareunia, dysuria, flank pain, frequency, genital sores, hematuria, urgency and vaginal discharge.   Skin: Negative for color change and rash.        Objective   Vitals:    11/25/20 0821   BP: 128/81   BP Location: Left arm   Patient Position: Sitting   Cuff Size: Adult   Pulse: 75   Temp: 98 °F (36.7 °C)   TempSrc: Infrared   SpO2: 97%   Weight: 97.5 kg (215 lb)   Height: 172.7 cm (68\")      Body mass index is 32.69 kg/m².    Physical Exam  Vitals signs and nursing note reviewed.   Constitutional:       General: She is not in acute distress.     Appearance: She is well-developed. She is obese. She is not ill-appearing.   HENT:      Head: Normocephalic and atraumatic.      Right Ear: External ear normal.      Left Ear: External ear normal.      Nose: Nose normal.   Eyes:      General: No scleral icterus.        Right eye: No discharge.         Left eye: No discharge.      Conjunctiva/sclera: Conjunctivae normal.      Pupils: Pupils are equal, round, and reactive to light.   Neck:      Musculoskeletal: Normal range of motion.   Cardiovascular:      Rate and Rhythm: Normal rate and regular rhythm.      Heart sounds: No murmur.   Pulmonary:      Effort: " Pulmonary effort is normal.      Breath sounds: No wheezing.      Comments: Patient declines breast exam, due to ordering  mammogram  Abdominal:      General: Bowel sounds are normal. There is no distension.      Palpations: Abdomen is soft. There is no mass.      Tenderness: There is abdominal tenderness. There is no guarding or rebound.      Hernia: No hernia is present.      Comments: Tenderness greatest in right lower quadrant   Genitourinary:     Vagina: Normal. No vaginal discharge.      Adnexa:         Right: No tenderness.          Left: No tenderness.        Comments: Mild atrophic vaginal changes.  There is tenderness to palpation of the right adnexa/right lower quadrant on bimanual exam, no worse than on abdominal exam  Musculoskeletal: Normal range of motion.   Lymphadenopathy:      Cervical: No cervical adenopathy.   Skin:     General: Skin is warm and dry.   Neurological:      Mental Status: She is alert and oriented to person, place, and time.         Lab Results   Component Value Date    GLU 97 11/06/2020    BUN 10 11/06/2020    CREATININE 0.98 11/06/2020    EGFRIFNONA 67 11/06/2020    EGFRIFAFRI 78 11/06/2020     11/06/2020    K 4.7 11/06/2020     11/06/2020    CALCIUM 9.7 11/06/2020    ALBUMIN 4.5 11/06/2020    BILITOT 0.2 11/06/2020    ALKPHOS 83 11/06/2020    AST 16 11/06/2020    ALT 17 11/06/2020    CHLPL 211 (H) 11/06/2020    TRIG 250 (H) 11/06/2020    HDL 39 (L) 11/06/2020    VLDL 44 (H) 11/06/2020     (H) 11/06/2020    WBC 3.6 11/06/2020    RBC 4.49 11/06/2020    HCT 40.8 11/06/2020    MCV 91 11/06/2020    MCH 31.4 11/06/2020    TSH 0.295 (L) 11/06/2020    FREET4 1.37 11/06/2020    GGEM34EU 40.7 11/06/2020    URICACID 4.6 11/06/2020     The 10-year ASCVD risk score (Douglas AKINS Jr., et al., 2013) is: 2%    Values used to calculate the score:      Age: 50 years      Sex: Female      Is Non- : No      Diabetic: No      Tobacco smoker: No      Systolic  Blood Pressure: 128 mmHg      Is BP treated: No      HDL Cholesterol: 39 mg/dL      Total Cholesterol: 211 mg/dL    Office Visit on 11/25/2020   Component Date Value Ref Range Status   • Color 11/25/2020 Yellow  Yellow, Straw, Dark Yellow, Mercedes Final   • Clarity, UA 11/25/2020 Clear  Clear Final   • Specific Gravity  11/25/2020 1.030  1.005 - 1.030 Final   • pH, Urine 11/25/2020 6.0  5.0 - 8.0 Final   • Leukocytes 11/25/2020 Negative  Negative Final   • Nitrite, UA 11/25/2020 Negative  Negative Final   • Protein, POC 11/25/2020 Negative  Negative mg/dL Final   • Glucose, UA 11/25/2020 Negative  Negative, 1000 mg/dL (3+) mg/dL Final   • Ketones, UA 11/25/2020 Negative  Negative Final   • Urobilinogen, UA 11/25/2020 Normal  Normal Final   • Bilirubin 11/25/2020 Negative  Negative Final   • Blood, UA 11/25/2020 Negative  Negative Final       No results found for: HGBA1C   CT abdomen and pelvis dated 11/9/2020 does not reveal any concerning abnormality.  There is no significant change to mildly prominent retroperitoneal lymph nodes and stranding in the mesenteric fat since at least 2016.  There is mild to moderate stool burden.    Assessment/Plan   Diagnoses and all orders for this visit:    1. Painful bladder spasm (Primary)  -     POC Urinalysis Dipstick, Automated    2. Acquired hypothyroidism    3. Chronic pain syndrome    4. Fibromyalgia    5. Mixed hyperlipidemia    6. Generalized abdominal pain    7. Encounter for screening for malignant neoplasm of colon  -     Ambulatory Referral For Screening Colonoscopy    8. Annual physical exam    9. Menopause  -     DEXA Bone Density Axial; Future  -     conjugated estrogens (Premarin) 0.625 MG/GM vaginal cream; Insert  into the vagina 2 (Two) Times a Week.  Dispense: 30 g; Refill: 12    10. Encounter for screening mammogram for breast cancer  -     Mammo Screening Digital Tomosynthesis Bilateral With CAD; Future    11. Constipation, unspecified constipation type  -      psyllium (Metamucil Smooth Texture) 58.6 % powder; 1 teaspoon daily, increase by 1 teaspoon every 3 days to a max of 2 tablespoons  Dispense: 425 g; Refill: 12    12. Right lower quadrant pain  -     Bacterial Vaginosis, PIERRE - Swab, Vagina    Persistent/intermittent right lower quadrant abdominal pain of uncertain etiology, possibly related to constipation or atrophic vaginitis, collecting bacterial vaginosis panel, referring to GI for colonoscopy.Advised to restart Premarin cream.  Start Metamucil on a daily basis slowly titrating up to 1 to 2 tablespoons as needed.  Hypothyroidism, T3 and T4 in normal range, patient does not feel overtreated at this time we will leave levothyroxine dose at 150 mcg.  Did discuss low cholesterol diet and increase physical activity.  Preventative care, orders as above    Medications Discontinued During This Encounter   Medication Reason   • nitrofurantoin, macrocrystal-monohydrate, (Macrobid) 100 MG capsule *Therapy completed   • conjugated estrogens (PREMARIN) 0.625 MG/GM vaginal cream Reorder          Return if symptoms worsen or fail to improve.    Education reference material relevant to visit available in AVS through [facility] or printed copy given.

## 2020-11-28 PROBLEM — N91.2 AMENORRHEA: Status: ACTIVE | Noted: 2020-11-28

## 2020-11-28 PROBLEM — R93.89 ABNORMAL COMPUTED TOMOGRAPHY SCAN: Status: ACTIVE | Noted: 2020-11-28

## 2020-11-28 PROBLEM — J30.9 ALLERGIC RHINITIS: Status: ACTIVE | Noted: 2020-11-28

## 2020-11-28 PROBLEM — I88.0 ACUTE MESENTERIC ADENITIS: Status: ACTIVE | Noted: 2020-11-28

## 2020-11-28 LAB
A VAGINAE DNA VAG QL NAA+PROBE: NORMAL SCORE
BVAB2 DNA VAG QL NAA+PROBE: NORMAL SCORE
MEGA1 DNA VAG QL NAA+PROBE: NORMAL SCORE

## 2020-12-02 ENCOUNTER — TELEPHONE (OUTPATIENT)
Dept: FAMILY MEDICINE CLINIC | Facility: CLINIC | Age: 50
End: 2020-12-02

## 2020-12-02 RX ORDER — FLUCONAZOLE 150 MG/1
150 TABLET ORAL ONCE
Qty: 1 TABLET | Refills: 0 | Status: SHIPPED | OUTPATIENT
Start: 2020-12-02 | End: 2020-12-02

## 2020-12-02 NOTE — TELEPHONE ENCOUNTER
Patient called today and stated she has restarted the metamucil and the premarin cream and states her symptoms has not really improved any, and wonders if it could be yeast, but that test was not ran and was not able to add it.

## 2020-12-02 NOTE — TELEPHONE ENCOUNTER
Please inform patient that I have sent a prescription for Diflucan to [facility].  I believe the benefit would far outweigh any harm and even if we had a negative test, yeast infection might still be a consideration.  Especially since she has had recent antibiotics.  If this does not improve her symptoms.  I may need to make referral to gynecology or another specialist.

## 2020-12-03 DIAGNOSIS — E03.9 ACQUIRED HYPOTHYROIDISM: ICD-10-CM

## 2020-12-03 RX ORDER — LEVOTHYROXINE SODIUM 0.15 MG/1
150 TABLET ORAL EVERY 24 HOURS
Qty: 90 TABLET | Refills: 1 | Status: SHIPPED | OUTPATIENT
Start: 2020-12-03 | End: 2021-06-17 | Stop reason: SDUPTHER

## 2020-12-21 DIAGNOSIS — F41.9 ANXIETY: ICD-10-CM

## 2020-12-21 RX ORDER — CLONAZEPAM 0.5 MG/1
0.5 TABLET ORAL EVERY 8 HOURS PRN
Qty: 90 TABLET | Refills: 1 | Status: SHIPPED | OUTPATIENT
Start: 2020-12-21 | End: 2021-05-06

## 2020-12-29 ENCOUNTER — OFFICE VISIT (OUTPATIENT)
Dept: FAMILY MEDICINE CLINIC | Facility: CLINIC | Age: 50
End: 2020-12-29

## 2020-12-29 VITALS
HEART RATE: 89 BPM | OXYGEN SATURATION: 98 % | BODY MASS INDEX: 32.43 KG/M2 | DIASTOLIC BLOOD PRESSURE: 82 MMHG | SYSTOLIC BLOOD PRESSURE: 120 MMHG | HEIGHT: 68 IN | TEMPERATURE: 98.8 F | WEIGHT: 214 LBS

## 2020-12-29 DIAGNOSIS — Z20.822 EXPOSURE TO COVID-19 VIRUS: Primary | ICD-10-CM

## 2020-12-29 DIAGNOSIS — R50.9 FEVER, UNSPECIFIED FEVER CAUSE: ICD-10-CM

## 2020-12-29 DIAGNOSIS — R52 BODY ACHES: ICD-10-CM

## 2020-12-29 DIAGNOSIS — J30.9 ALLERGIC RHINITIS, UNSPECIFIED SEASONALITY, UNSPECIFIED TRIGGER: ICD-10-CM

## 2020-12-29 PROCEDURE — 87804 INFLUENZA ASSAY W/OPTIC: CPT | Performed by: NURSE PRACTITIONER

## 2020-12-29 PROCEDURE — 99214 OFFICE O/P EST MOD 30 MIN: CPT | Performed by: NURSE PRACTITIONER

## 2020-12-29 RX ORDER — GUAIFENESIN AND CODEINE PHOSPHATE 100; 10 MG/5ML; MG/5ML
5 SOLUTION ORAL 4 TIMES DAILY PRN
Qty: 240 ML | Refills: 1 | Status: SHIPPED | OUTPATIENT
Start: 2020-12-29 | End: 2021-02-24

## 2020-12-29 RX ORDER — PSEUDOEPHEDRINE HCL 120 MG/1
120 TABLET, FILM COATED, EXTENDED RELEASE ORAL EVERY 12 HOURS
Qty: 60 TABLET | Refills: 2 | Status: SHIPPED | OUTPATIENT
Start: 2020-12-29 | End: 2022-09-30

## 2020-12-29 NOTE — PROGRESS NOTES
"    Barbara Carter is a 50 y.o. female.     50-year-old obese white female with history of anxiety depression, allergic rhinitis, chronic pain, hypothyroidism who comes in today with symptoms of headache, pain between her shoulder blades with a deep breath, fatigue sore throat sinus pressure and low-grade fever since .  She states she was exposed to Covid by her niece.  Her   currently does not have symptoms.  She also complains of severe coughing when lying down flat which I am sure is postnasal drip I placed her on allergy medication and a codeine cough syrup so she can sleep.  Patient is not necessarily a high risk respiratory patient however I did warn her that this can go into pneumonia quickly and to keep in touch with Dr. Griffin  Blood pressure 120/82 heart rate 88 she denies any chest pain, dyspnea, tachycardia or dizziness      Zyrtec 10 mg a.m./Flonase nasal spray/Sudafed 120 twice daily/Benadryl 25 q at bedtime   guaifenesin/codeine cough syrup as needed  Keep Tylenol in your system and take tramadol as directed for fever and body aches  If Covid test is positive quarantine 14 days from onset of symptoms  Quarantine at home until results come back   keep in touch with Dr. Griffin         The following portions of the patient's history were reviewed and updated as appropriate: allergies, current medications, past family history, past medical history, past social history, past surgical history and problem list.    Vitals:    20 1308   BP: 120/82   BP Location: Left arm   Patient Position: Sitting   Cuff Size: Large Adult   Pulse: 89   Temp: 98.8 °F (37.1 °C)   TempSrc: Oral   SpO2: 98%   Weight: 97.1 kg (214 lb)   Height: 172.7 cm (68\")     Body mass index is 32.54 kg/m².    Past Medical History:   Diagnosis Date   • Hypothyroidism    • Sjogren's syndrome (CMS/HCC)      Past Surgical History:   Procedure Laterality Date   • APPENDECTOMY     •  SECTION     • CHOLECYSTECTOMY   "     Family History   Problem Relation Age of Onset   • Thyroid disease Mother    • Diabetes Mother         DMII   • Heart failure Mother    • Hypertension Mother    • Stroke Father    • Colon cancer Paternal Grandfather      Immunization History   Administered Date(s) Administered   • Flu Vaccine Quad PF >18YRS 08/24/2019   • Influenza Quad Vaccine (Inpatient) 08/24/2019, 09/14/2020   • Tdap 08/24/2019       Office Visit on 11/25/2020   Component Date Value Ref Range Status   • Color 11/25/2020 Yellow  Yellow, Straw, Dark Yellow, Mercedes Final   • Clarity, UA 11/25/2020 Clear  Clear Final   • Specific Gravity  11/25/2020 1.030  1.005 - 1.030 Final   • pH, Urine 11/25/2020 6.0  5.0 - 8.0 Final   • Leukocytes 11/25/2020 Negative  Negative Final   • Nitrite, UA 11/25/2020 Negative  Negative Final   • Protein, POC 11/25/2020 Negative  Negative mg/dL Final   • Glucose, UA 11/25/2020 Negative  Negative, 1000 mg/dL (3+) mg/dL Final   • Ketones, UA 11/25/2020 Negative  Negative Final   • Urobilinogen, UA 11/25/2020 Normal  Normal Final   • Bilirubin 11/25/2020 Negative  Negative Final   • Blood, UA 11/25/2020 Negative  Negative Final   • Atopobium Vaginae 11/25/2020 Low - 0  Score Final   • BVAB 2 11/25/2020 Low - 0  Score Final   • Megasphaera 1 11/25/2020 Low - 0  Score Final    Comment: Calculate total score by adding the 3 individual bacterial  vaginosis (BV) marker scores together.  Total score is  interpreted as follows:  Total score 0-1: Indicates the absence of BV.  Total score   2: Indeterminate for BV. Additional clinical                   data should be evaluated to establish a                   diagnosis.  Total score 3-6: Indicates the presence of BV.  This test was developed and its performance characteristics  determined by [facility].  It has not been cleared or approved  by the Food and Drug Administration.  The FDA has determined  that such clearance or approval is not necessary.           Review of  Systems    Objective   Physical Exam    Procedures    Assessment/Plan   Diagnoses and all orders for this visit:    1. Exposure to COVID-19 virus (Primary)  -     COVID-19,[facility] ROUTINE, NP/OP SWAB IN TRANSPORT MEDIA OR ESWAB 72 HR TAT - Swab, Nasopharynx  -     guaiFENesin-codeine (GUAIFENESIN AC) 100-10 MG/5ML liquid; Take 5 mL by mouth 4 (Four) Times a Day As Needed for Cough. 1-2 tsp every 4-6 hours as needed for cough. Monitor for drowsiness  Dispense: 240 mL; Refill: 1  -     POC Influenza A / B    2. Fever, unspecified fever cause  -     COVID-19,[facility] ROUTINE, NP/OP SWAB IN TRANSPORT MEDIA OR ESWAB 72 HR TAT - Swab, Nasopharynx  -     POC Influenza A / B    3. Body aches  -     COVID-19,[facility] ROUTINE, NP/OP SWAB IN TRANSPORT MEDIA OR ESWAB 72 HR TAT - Swab, Nasopharynx  -     POC Influenza A / B    4. Allergic rhinitis, unspecified seasonality, unspecified trigger    Other orders  -     pseudoephedrine (Sudafed 12 Hour) 120 MG 12 hr tablet; Take 1 tablet by mouth Every 12 (Twelve) Hours.  Dispense: 60 tablet; Refill: 2          Current Outpatient Medications:   •  Artificial Saliva (SALIVAMAX) pack, Take 1 packet by mouth Daily. Dissolve 1 packet in 30 oz of water up to 10x daily. Swish half of solution x 1 minute, spit, then repeat., Disp: , Rfl:   •  aspirin (ASPIRIN ADULT LOW DOSE) 81 MG EC tablet, Take 1 tablet by mouth Daily., Disp: , Rfl:   •  Carboxymethylcellul-Glycerin (REFRESH OPTIVE) 0.5-0.9 % solution, Apply 1 drop to eye(s) as directed by provider As Needed., Disp: , Rfl:   •  cevimeline (EVOXAC) 30 MG capsule, Take 1 capsule by mouth Every 8 (Eight) Hours., Disp: , Rfl:   •  clonazePAM (KlonoPIN) 0.5 MG tablet, TAKE 1 TABLET BY MOUTH EVERY 8 (EIGHT) HOURS AS NEEDED FOR ANXIETY., Disp: 90 tablet, Rfl: 1  •  conjugated estrogens (Premarin) 0.625 MG/GM vaginal cream, Insert  into the vagina 2 (Two) Times a Week., Disp: 30 g, Rfl: 12  •  diclofenac (VOLTAREN) 1 % gel gel, 2 gm to small joints  or 4 gm to large joints qid prn pain, Disp: 300 g, Rfl: 3  •  DULoxetine (Cymbalta) 60 MG capsule, Take 1 capsule by mouth Daily., Disp: 30 capsule, Rfl: 5  •  ergocalciferol (ERGOCALCIFEROL) 1.25 MG (36567 UT) capsule, Take 1 capsule by mouth 2 (Two) Times a Week., Disp: 24 capsule, Rfl: 1  •  fluticasone (FLONASE ALLERGY RELIEF) 50 MCG/ACT nasal spray, 2 sprays into the nostril(s) as directed by provider Daily., Disp: , Rfl:   •  gabapentin (NEURONTIN) 300 MG capsule, Take 1 capsule by mouth 2 (Two) Times a Day. For burning, Disp: 60 capsule, Rfl: 5  •  hydroxychloroquine (PLAQUENIL) 200 MG tablet, Take 2 tablets by mouth Daily., Disp: , Rfl:   •  Hyoscyamine Sulfate SL (Levsin/SL) 0.125 MG sublingual tablet, Place 1 tablet under the tongue 4 (Four) Times a Day As Needed (gut or bladder spasm)., Disp: 30 each, Rfl: 0  •  levothyroxine (Synthroid) 150 MCG tablet, Take 1 tablet by mouth Daily., Disp: 90 tablet, Rfl: 1  •  Lidocaine Viscous HCl (XYLOCAINE) 2 % solution, Take 10 mL by mouth As Needed for Mild Pain . Dilute with water and gargle as needed, Disp: 100 mL, Rfl: 0  •  metaxalone (SKELAXIN) 800 MG tablet, Take 1 tablet by mouth Every 8 (Eight) Hours As Needed for Muscle Spasms., Disp: 90 tablet, Rfl: 0  •  Multiple Vitamins-Minerals (CENTRUM ADULTS) tablet, Take 1 tablet by mouth Daily., Disp: , Rfl:   •  naloxone (NARCAN) 4 MG/0.1ML nasal spray, 1 spray into the nostril(s) as directed by provider As Needed (narcotic overdose)., Disp: 1 each, Rfl: 1  •  omeprazole (priLOSEC) 40 MG capsule, Take 1 capsule by mouth Daily., Disp: 90 capsule, Rfl: 0  •  ondansetron (Zofran) 8 MG tablet, Take 1 tablet by mouth Every 8 (Eight) Hours As Needed for Nausea or Vomiting., Disp: 30 tablet, Rfl: 3  •  oxyCODONE-acetaminophen (Percocet) 5-325 MG per tablet, Take 1 tablet by mouth Every 6 (Six) Hours As Needed for Severe Pain . 1 tablet q hs and q4h PRN severe pain, Disp: 90 tablet, Rfl: 0  •  psyllium (Metamucil Smooth  Texture) 58.6 % powder, 1 teaspoon daily, increase by 1 teaspoon every 3 days to a max of 2 tablespoons, Disp: 425 g, Rfl: 12  •  traMADol (ULTRAM) 50 MG tablet, Take 2 tablets by mouth Every 6 (Six) Hours As Needed for Moderate Pain  or Severe Pain ., Disp: 360 tablet, Rfl: 1  •  guaiFENesin-codeine (GUAIFENESIN AC) 100-10 MG/5ML liquid, Take 5 mL by mouth 4 (Four) Times a Day As Needed for Cough. 1-2 tsp every 4-6 hours as needed for cough. Monitor for drowsiness, Disp: 240 mL, Rfl: 1  •  pseudoephedrine (Sudafed 12 Hour) 120 MG 12 hr tablet, Take 1 tablet by mouth Every 12 (Twelve) Hours., Disp: 60 tablet, Rfl: 2

## 2020-12-29 NOTE — PATIENT INSTRUCTIONS
Take allergy medication as directed  Cough syrup as needed for cough  Take Tylenol and prescribed dose of tramadol for fever and body aches  If Covid test is positive quarantine 14 days from onset of symptoms  Quarantine at home until Covid results come back  Keep in touch with Dr. Griffin

## 2020-12-30 LAB — SARS-COV-2 RNA RESP QL NAA+PROBE: DETECTED

## 2020-12-31 ENCOUNTER — TELEPHONE (OUTPATIENT)
Dept: FAMILY MEDICINE CLINIC | Facility: CLINIC | Age: 50
End: 2020-12-31

## 2021-01-04 ENCOUNTER — TELEPHONE (OUTPATIENT)
Dept: FAMILY MEDICINE CLINIC | Facility: CLINIC | Age: 51
End: 2021-01-04

## 2021-01-04 DIAGNOSIS — R11.0 NAUSEA: ICD-10-CM

## 2021-01-04 RX ORDER — ONDANSETRON HYDROCHLORIDE 8 MG/1
8 TABLET, FILM COATED ORAL EVERY 8 HOURS PRN
Qty: 30 TABLET | Refills: 3 | Status: SHIPPED | OUTPATIENT
Start: 2021-01-04 | End: 2021-05-18 | Stop reason: SDUPTHER

## 2021-01-04 RX ORDER — PROMETHAZINE HYDROCHLORIDE 12.5 MG/1
TABLET ORAL
Qty: 30 TABLET | Refills: 0 | Status: SHIPPED | OUTPATIENT
Start: 2021-01-04 | End: 2021-05-18 | Stop reason: SDUPTHER

## 2021-01-04 NOTE — TELEPHONE ENCOUNTER
I called her in Phenergan which will make her little drowsy but she needs to take it 4 times a day for couple of days and try to get back to normal eating.  I also called her in some Zofran which will not cause drowsiness in case she needs to go out or drive  If nothing else try to stay hydrated

## 2021-01-04 NOTE — TELEPHONE ENCOUNTER
Patient was diagnosis with COVID last week and states she is feeling very nauseated, states unable to keep anything on her stomach due to diarrhea right after trying to eat, and not able to keep fluids down, anything you can advise her to try or send to pharmacy for her to try

## 2021-01-15 DIAGNOSIS — M35.00 SJOGREN'S SYNDROME WITHOUT EXTRAGLANDULAR INVOLVEMENT (HCC): ICD-10-CM

## 2021-01-15 RX ORDER — CEVIMELINE HYDROCHLORIDE 30 MG/1
30 CAPSULE ORAL EVERY 8 HOURS
Qty: 90 CAPSULE | Refills: 1 | Status: SHIPPED | OUTPATIENT
Start: 2021-01-15 | End: 2021-11-02 | Stop reason: SDUPTHER

## 2021-02-17 DIAGNOSIS — M54.2 NECK PAIN: ICD-10-CM

## 2021-02-17 DIAGNOSIS — M79.7 FIBROMYALGIA: ICD-10-CM

## 2021-02-17 DIAGNOSIS — G89.4 CHRONIC PAIN SYNDROME: ICD-10-CM

## 2021-02-17 DIAGNOSIS — M50.123 CERVICAL DISC DISORDER AT C6-C7 LEVEL WITH RADICULOPATHY: ICD-10-CM

## 2021-02-17 RX ORDER — TRAMADOL HYDROCHLORIDE 50 MG/1
100 TABLET ORAL EVERY 6 HOURS PRN
Qty: 360 TABLET | Refills: 1 | Status: SHIPPED | OUTPATIENT
Start: 2021-02-17 | End: 2022-08-10 | Stop reason: SDUPTHER

## 2021-02-24 ENCOUNTER — OFFICE VISIT (OUTPATIENT)
Dept: FAMILY MEDICINE CLINIC | Facility: CLINIC | Age: 51
End: 2021-02-24

## 2021-02-24 ENCOUNTER — TELEPHONE (OUTPATIENT)
Dept: FAMILY MEDICINE CLINIC | Facility: CLINIC | Age: 51
End: 2021-02-24

## 2021-02-24 VITALS
WEIGHT: 216 LBS | BODY MASS INDEX: 32.74 KG/M2 | HEIGHT: 68 IN | TEMPERATURE: 97.5 F | HEART RATE: 90 BPM | OXYGEN SATURATION: 98 % | DIASTOLIC BLOOD PRESSURE: 85 MMHG | SYSTOLIC BLOOD PRESSURE: 129 MMHG

## 2021-02-24 DIAGNOSIS — M54.50 ACUTE LOW BACK PAIN, UNSPECIFIED BACK PAIN LATERALITY, UNSPECIFIED WHETHER SCIATICA PRESENT: Primary | ICD-10-CM

## 2021-02-24 LAB
BILIRUB BLD-MCNC: NEGATIVE MG/DL
CLARITY, POC: CLEAR
COLOR UR: YELLOW
GLUCOSE UR STRIP-MCNC: NEGATIVE MG/DL
KETONES UR QL: NEGATIVE
LEUKOCYTE EST, POC: NEGATIVE
NITRITE UR-MCNC: NEGATIVE MG/ML
PH UR: 6 [PH] (ref 5–8)
PROT UR STRIP-MCNC: NEGATIVE MG/DL
RBC # UR STRIP: NEGATIVE /UL
SP GR UR: 1.01 (ref 1–1.03)
UROBILINOGEN UR QL: NORMAL

## 2021-02-24 PROCEDURE — 81003 URINALYSIS AUTO W/O SCOPE: CPT | Performed by: NURSE PRACTITIONER

## 2021-02-24 PROCEDURE — 99213 OFFICE O/P EST LOW 20 MIN: CPT | Performed by: NURSE PRACTITIONER

## 2021-02-24 RX ORDER — CYCLOBENZAPRINE HCL 10 MG
10 TABLET ORAL NIGHTLY PRN
Qty: 30 TABLET | Refills: 2 | Status: SHIPPED | OUTPATIENT
Start: 2021-02-24 | End: 2021-12-13 | Stop reason: SDUPTHER

## 2021-02-24 RX ORDER — PREDNISONE 5 MG/1
TABLET ORAL
Qty: 20 TABLET | Refills: 0 | Status: SHIPPED | OUTPATIENT
Start: 2021-02-24 | End: 2021-03-04

## 2021-02-24 RX ORDER — METAXALONE 800 MG/1
800 TABLET ORAL EVERY 8 HOURS PRN
Qty: 90 TABLET | Refills: 0 | Status: SHIPPED | OUTPATIENT
Start: 2021-02-24 | End: 2021-02-24

## 2021-02-24 NOTE — TELEPHONE ENCOUNTER
Pharmacy sent over fax, it looks like patient was seen today in office by Maryam Boothe and was prescribed Metaxalone 800mg tablet and patients insurance is not going to cover it. They said a suggested alternative would be Cyclobenzaprine 10 mg tablet

## 2021-02-24 NOTE — PROGRESS NOTES
"    Barbara Carter is a 50 y.o. female.     50-year-old obese white female with history of anxiety depression, allergic rhinitis, chronic pain,  hypothyroidism who comes in today with complaints of right lower back pain with some radiculopathy down back of leg.  She states it started hurting yesterday and she rates it about a 7 on a scale of 1-10.  Patient states this has happened before.  She does have a history of kidney stones in the past but her urine was negative for blood and  Since she has radiculopathy is probably musculoskeletal.  She is already on oxycodone I am placing her on steroids and anti-inflammatories along with heat and ice and patient has requested to be off work till Monday due to her back pain but she also has a death in the family and is very upset  Blood pressure 128/85 heart rate 90 she denies any chest pain, dyspnea, tachycardia or dizziness  Weight is 216 with a BMI of 32.8    Metaxalone 800mg tid prn  Prednisone 5 mg taper dose   ice heat and back stretching exercises  Off work till Monday  Follow-up with Dr. Griffin if no improvement         The following portions of the patient's history were reviewed and updated as appropriate: allergies, current medications, past family history, past medical history, past social history, past surgical history and problem list.    Vitals:    21 1016   BP: 129/85   BP Location: Right arm   Patient Position: Sitting   Cuff Size: Large Adult   Pulse: 90   Temp: 97.5 °F (36.4 °C)   TempSrc: Temporal   SpO2: 98%   Weight: 98 kg (216 lb)   Height: 172.7 cm (68\")     Body mass index is 32.84 kg/m².    Past Medical History:   Diagnosis Date   • Hypothyroidism    • Sjogren's syndrome (CMS/HCC)      Past Surgical History:   Procedure Laterality Date   • APPENDECTOMY     •  SECTION     • CHOLECYSTECTOMY       Family History   Problem Relation Age of Onset   • Thyroid disease Mother    • Diabetes Mother         DMII   • Heart failure Mother    • " Hypertension Mother    • Stroke Father    • Colon cancer Paternal Grandfather      Immunization History   Administered Date(s) Administered   • Flu Vaccine Quad PF >18YRS 08/24/2019   • Influenza Quad Vaccine (Inpatient) 08/24/2019, 09/14/2020   • Tdap 08/24/2019       Office Visit on 12/29/2020   Component Date Value Ref Range Status   • SARS-CoV-2, PIERRE 12/29/2020 Detected* Not Detected Final    Comment: This nucleic acid amplification test was developed and its performance  characteristics determined by [facility]. Nucleic acid  amplification tests include PCR and TMA. This test has not been FDA  cleared or approved. This test has been authorized by FDA under an  Emergency Use Authorization (EUA). This test is only authorized for  the duration of time the declaration that circumstances exist  justifying the authorization of the emergency use of in vitro  diagnostic tests for detection of SARS-CoV-2 virus and/or diagnosis  of COVID-19 infection under section 564(b)(1) of the Act, 21 U.S.C.  360bbb-3(b) (1), unless the authorization is terminated or revoked  sooner.  When diagnostic testing is negative, the possibility of a false  negative result should be considered in the context of a patient's  recent exposures and the presence of clinical signs and symptoms  consistent with COVID-19. An individual without symptoms of COVID-19  and who is not shedding SARS-CoV-2 virus would                            expect to have a  negative (not detected) result in this assay.     • Rapid Influenza A Ag 12/29/2020 Negative  Negative Final   • Rapid Influenza B Ag 12/29/2020 Negative  Negative Final   • Internal Control 12/29/2020 Passed  Passed Final   • Lot Number 12/29/2020 [id number]   Final   • Expiration Date 12/29/2020 12/31/2021   Final         Review of Systems   Constitutional: Negative.    HENT: Negative.    Cardiovascular: Negative.    Genitourinary: Negative.    Musculoskeletal: Positive for back pain.    Skin: Negative.    Neurological: Negative.    Psychiatric/Behavioral: Negative.        Objective   Physical Exam  Constitutional:       Appearance: Normal appearance.   HENT:      Head: Normocephalic.   Neck:      Musculoskeletal: Normal range of motion.   Cardiovascular:      Rate and Rhythm: Normal rate and regular rhythm.      Pulses: Normal pulses.      Heart sounds: Normal heart sounds.   Pulmonary:      Effort: Pulmonary effort is normal.      Breath sounds: Normal breath sounds.   Abdominal:      General: Bowel sounds are normal.   Musculoskeletal:      Comments: Right-sided low back pain with some radiculopathy   Skin:     General: Skin is warm and dry.   Neurological:      General: No focal deficit present.      Mental Status: She is alert and oriented to person, place, and time.   Psychiatric:         Behavior: Behavior normal.      Comments: Patient very upset over loss of family member         Procedures    Assessment/Plan   Diagnoses and all orders for this visit:    1. Acute low back pain, unspecified back pain laterality, unspecified whether sciatica present (Primary)  -     POCT urinalysis dipstick, automated    Other orders  -     predniSONE 5 MG (48) tablet therapy pack dose pack; Take 4 tablets by mouth Daily for 2 days, THEN 3 tablets Daily for 2 days, THEN 2 tablets Daily for 2 days, THEN 1 tablet Daily for 2 days.  Dispense: 20 tablet; Refill: 0  -     metaxalone (SKELAXIN) 800 MG tablet; Take 1 tablet by mouth Every 8 (Eight) Hours As Needed for Muscle Spasms.  Dispense: 90 tablet; Refill: 0          Current Outpatient Medications:   •  Artificial Saliva (SALIVAMAX) pack, Take 1 packet by mouth Daily. Dissolve 1 packet in 30 oz of water up to 10x daily. Swish half of solution x 1 minute, spit, then repeat., Disp: , Rfl:   •  aspirin (ASPIRIN ADULT LOW DOSE) 81 MG EC tablet, Take 1 tablet by mouth Daily., Disp: , Rfl:   •  Carboxymethylcellul-Glycerin (REFRESH OPTIVE) 0.5-0.9 % solution,  Apply 1 drop to eye(s) as directed by provider As Needed., Disp: , Rfl:   •  cevimeline (Evoxac) 30 MG capsule, Take 1 capsule by mouth Every 8 (Eight) Hours., Disp: 90 capsule, Rfl: 1  •  clonazePAM (KlonoPIN) 0.5 MG tablet, TAKE 1 TABLET BY MOUTH EVERY 8 (EIGHT) HOURS AS NEEDED FOR ANXIETY., Disp: 90 tablet, Rfl: 1  •  conjugated estrogens (Premarin) 0.625 MG/GM vaginal cream, Insert  into the vagina 2 (Two) Times a Week., Disp: 30 g, Rfl: 12  •  diclofenac (VOLTAREN) 1 % gel gel, 2 gm to small joints or 4 gm to large joints qid prn pain, Disp: 300 g, Rfl: 3  •  DULoxetine (Cymbalta) 60 MG capsule, Take 1 capsule by mouth Daily., Disp: 30 capsule, Rfl: 5  •  ergocalciferol (ERGOCALCIFEROL) 1.25 MG (35626 UT) capsule, Take 1 capsule by mouth 2 (Two) Times a Week., Disp: 24 capsule, Rfl: 1  •  fluticasone (FLONASE ALLERGY RELIEF) 50 MCG/ACT nasal spray, 2 sprays into the nostril(s) as directed by provider Daily., Disp: , Rfl:   •  gabapentin (NEURONTIN) 300 MG capsule, Take 1 capsule by mouth 2 (Two) Times a Day. For burning, Disp: 60 capsule, Rfl: 5  •  hydroxychloroquine (PLAQUENIL) 200 MG tablet, Take 2 tablets by mouth Daily., Disp: , Rfl:   •  Hyoscyamine Sulfate SL (Levsin/SL) 0.125 MG sublingual tablet, Place 1 tablet under the tongue 4 (Four) Times a Day As Needed (gut or bladder spasm)., Disp: 30 each, Rfl: 0  •  levothyroxine (Synthroid) 150 MCG tablet, Take 1 tablet by mouth Daily., Disp: 90 tablet, Rfl: 1  •  Lidocaine Viscous HCl (XYLOCAINE) 2 % solution, Take 10 mL by mouth As Needed for Mild Pain . Dilute with water and gargle as needed, Disp: 100 mL, Rfl: 0  •  Multiple Vitamins-Minerals (CENTRUM ADULTS) tablet, Take 1 tablet by mouth Daily., Disp: , Rfl:   •  omeprazole (priLOSEC) 40 MG capsule, Take 1 capsule by mouth Daily., Disp: 90 capsule, Rfl: 0  •  ondansetron (Zofran) 8 MG tablet, Take 1 tablet by mouth Every 8 (Eight) Hours As Needed for Nausea or Vomiting., Disp: 30 tablet, Rfl: 3  •   oxyCODONE-acetaminophen (Percocet) 5-325 MG per tablet, Take 1 tablet by mouth Every 6 (Six) Hours As Needed for Severe Pain . 1 tablet q hs and q4h PRN severe pain, Disp: 90 tablet, Rfl: 0  •  promethazine (PHENERGAN) 12.5 MG tablet, 1-2 q6h prn, Disp: 30 tablet, Rfl: 0  •  pseudoephedrine (Sudafed 12 Hour) 120 MG 12 hr tablet, Take 1 tablet by mouth Every 12 (Twelve) Hours., Disp: 60 tablet, Rfl: 2  •  psyllium (Metamucil Smooth Texture) 58.6 % powder, 1 teaspoon daily, increase by 1 teaspoon every 3 days to a max of 2 tablespoons, Disp: 425 g, Rfl: 12  •  traMADol (ULTRAM) 50 MG tablet, Take 2 tablets by mouth Every 6 (Six) Hours As Needed for Moderate Pain  or Severe Pain ., Disp: 360 tablet, Rfl: 1  •  metaxalone (SKELAXIN) 800 MG tablet, Take 1 tablet by mouth Every 8 (Eight) Hours As Needed for Muscle Spasms., Disp: 90 tablet, Rfl: 0  •  naloxone (NARCAN) 4 MG/0.1ML nasal spray, 1 spray into the nostril(s) as directed by provider As Needed (narcotic overdose)., Disp: 1 each, Rfl: 1  •  predniSONE 5 MG (48) tablet therapy pack dose pack, Take 4 tablets by mouth Daily for 2 days, THEN 3 tablets Daily for 2 days, THEN 2 tablets Daily for 2 days, THEN 1 tablet Daily for 2 days., Disp: 20 tablet, Rfl: 0

## 2021-02-24 NOTE — PATIENT INSTRUCTIONS
Take steroids and muscle relaxers as directed monitor for drowsiness  Ice and heat back stretching exercises  Off work till Monday  Follow-up with Dr. Griffin if no improvement

## 2021-03-08 DIAGNOSIS — K21.9 GASTROESOPHAGEAL REFLUX DISEASE, UNSPECIFIED WHETHER ESOPHAGITIS PRESENT: ICD-10-CM

## 2021-03-08 RX ORDER — OMEPRAZOLE 40 MG/1
40 CAPSULE, DELAYED RELEASE ORAL DAILY
Qty: 90 CAPSULE | Refills: 0 | Status: SHIPPED | OUTPATIENT
Start: 2021-03-08 | End: 2021-06-17 | Stop reason: SDUPTHER

## 2021-03-17 DIAGNOSIS — E55.9 VITAMIN D DEFICIENCY: ICD-10-CM

## 2021-03-17 RX ORDER — ERGOCALCIFEROL 1.25 MG/1
1 CAPSULE ORAL 2 TIMES WEEKLY
Qty: 24 CAPSULE | Refills: 1 | Status: SHIPPED | OUTPATIENT
Start: 2021-03-18 | End: 2022-01-03 | Stop reason: SDUPTHER

## 2021-03-22 ENCOUNTER — HOSPITAL ENCOUNTER (OUTPATIENT)
Dept: BONE DENSITY | Facility: HOSPITAL | Age: 51
Discharge: HOME | End: 2021-03-22

## 2021-03-22 ENCOUNTER — HOSPITAL ENCOUNTER (OUTPATIENT)
Dept: MAMMOGRAPHY | Facility: HOSPITAL | Age: 51
Discharge: HOME | End: 2021-03-22

## 2021-03-22 ENCOUNTER — OFFICE VISIT (OUTPATIENT)
Dept: CARDIOLOGY | Facility: CLINIC | Age: 51
End: 2021-03-22

## 2021-03-22 VITALS
WEIGHT: 217 LBS | HEART RATE: 73 BPM | DIASTOLIC BLOOD PRESSURE: 88 MMHG | OXYGEN SATURATION: 98 % | SYSTOLIC BLOOD PRESSURE: 157 MMHG | BODY MASS INDEX: 32.99 KG/M2

## 2021-03-22 DIAGNOSIS — R55 VASOVAGAL SYNCOPE: ICD-10-CM

## 2021-03-22 DIAGNOSIS — Z98.890 HISTORY OF LOOP RECORDER: ICD-10-CM

## 2021-03-22 DIAGNOSIS — I45.81 LONG Q-T SYNDROME: Primary | ICD-10-CM

## 2021-03-22 DIAGNOSIS — Z12.31 ENCOUNTER FOR SCREENING MAMMOGRAM FOR BREAST CANCER: ICD-10-CM

## 2021-03-22 DIAGNOSIS — Z78.0 MENOPAUSE: ICD-10-CM

## 2021-03-22 PROCEDURE — 99213 OFFICE O/P EST LOW 20 MIN: CPT | Performed by: INTERNAL MEDICINE

## 2021-03-22 PROCEDURE — 77067 SCR MAMMO BI INCL CAD: CPT

## 2021-03-22 PROCEDURE — 77080 DXA BONE DENSITY AXIAL: CPT

## 2021-03-22 PROCEDURE — 93000 ELECTROCARDIOGRAM COMPLETE: CPT | Performed by: INTERNAL MEDICINE

## 2021-03-22 PROCEDURE — 77063 BREAST TOMOSYNTHESIS BI: CPT

## 2021-03-22 NOTE — PROGRESS NOTES
CC--syncope and loop recorder in situ    Sub  50-year-old female patient with family history of long QT syndrome  Patient's family members were diagnosed with KCNQ1, diagnosis  is being made and the nephew, niece and 1 of her sister  Patient has history of syncope and a [employer] loop recorder was implanted at [facility] cardiologist  Patient's electrophysiologist left practice and patient came for establishment of practice  Patient has known diagnosis of Sjogren's syndrome and intolerance of beta-blockers because of severe drug-induced bradycardia  She does have syncope and the syncope is preceded by symptoms of lightheadedness and followed by symptoms of prolonged fatigue highly suggestive of vasovagal syncope  Since her loop recorder implantation no arrhythmias were diagnosed  She denies any recent syncope in the last few months and denies any other cardiovascular symptoms        Past Medical History:   Diagnosis Date   • Hypothyroidism    • Sjogren's syndrome (CMS/HCC)      Past Surgical History:   Procedure Laterality Date   • APPENDECTOMY     •  SECTION     • CHOLECYSTECTOMY         Physical Exam    General:      well developed, well nourished, in no acute distress.    Head:      normocephalic and atraumatic.    Eyes:      PERRL/EOM intact, conjunctiva and sclera clear with out nystagmus.    Neck:      no masses, thyromegaly, trachea central with normal respiratory effort  Lungs:      clear bilaterally to auscultation.    Heart:       regular rate and rhythm, S1, S2 without murmurs, rubs, or gallops        Assessment plan    History of syncope highly suspicious for vasovagal syncope--plenty of hydration educated to patient  Family history of long QT syndrome and patient's EKG does reveal mildly prolonged QT  Internal loop recorder in situ and loop recorder interrogation revealed loop recorder battery depletion  No arrhythmias noted on loop recorder interrogation  History of Sjogren's  syndrome and hypothyroidism  Patient educated about battery depletion of loop recorder and patient is not interested in removal of loop recorder  Patient educated about plenty of hydration to avoid vasovagal episodes  Patient educated about genetic screening for long QT and gene testing ordered  Patient educated about medications to avoid which can prolong prolonged QT  Follow-up in 1 year  Role of plaquenil in long QT educated  Smart watch apps educated  HTN MONITORING EDUCATED        ECG 12 Lead    Date/Time: 3/22/2021 8:55 AM  Performed by: Alexis Edward MD  Authorized by: Alexis Edward MD   Comparison: compared with previous ECG   Similar to previous ECG  Rhythm: sinus rhythm  Rate: normal  Conduction: conduction normal  QRS axis: normal  Comments:  MSEC              Electronically signed by Alexis Edward MD, 03/22/21, 8:41 AM EDT.

## 2021-03-24 ENCOUNTER — OFFICE VISIT (OUTPATIENT)
Dept: FAMILY MEDICINE CLINIC | Facility: CLINIC | Age: 51
End: 2021-03-24

## 2021-03-24 VITALS
TEMPERATURE: 96.8 F | HEART RATE: 79 BPM | OXYGEN SATURATION: 96 % | HEIGHT: 68 IN | SYSTOLIC BLOOD PRESSURE: 121 MMHG | DIASTOLIC BLOOD PRESSURE: 87 MMHG | RESPIRATION RATE: 16 BRPM | BODY MASS INDEX: 32.64 KG/M2 | WEIGHT: 215.4 LBS

## 2021-03-24 DIAGNOSIS — M35.00 SJOGREN'S SYNDROME WITHOUT EXTRAGLANDULAR INVOLVEMENT (HCC): ICD-10-CM

## 2021-03-24 DIAGNOSIS — J01.40 SUBACUTE PANSINUSITIS: Primary | ICD-10-CM

## 2021-03-24 DIAGNOSIS — Z71.85 IMMUNIZATION COUNSELING: ICD-10-CM

## 2021-03-24 PROCEDURE — 99213 OFFICE O/P EST LOW 20 MIN: CPT | Performed by: FAMILY MEDICINE

## 2021-03-24 RX ORDER — PREDNISONE 20 MG/1
40 TABLET ORAL 2 TIMES DAILY
Qty: 10 TABLET | Refills: 0 | Status: SHIPPED | OUTPATIENT
Start: 2021-03-24 | End: 2021-04-19

## 2021-03-24 RX ORDER — AMOXICILLIN 500 MG/1
500 CAPSULE ORAL 3 TIMES DAILY
Qty: 30 CAPSULE | Refills: 0 | Status: SHIPPED | OUTPATIENT
Start: 2021-03-24 | End: 2021-04-19

## 2021-04-19 ENCOUNTER — OFFICE VISIT (OUTPATIENT)
Dept: FAMILY MEDICINE CLINIC | Facility: CLINIC | Age: 51
End: 2021-04-19

## 2021-04-19 ENCOUNTER — TELEPHONE (OUTPATIENT)
Dept: FAMILY MEDICINE CLINIC | Facility: CLINIC | Age: 51
End: 2021-04-19

## 2021-04-19 VITALS
BODY MASS INDEX: 32.98 KG/M2 | RESPIRATION RATE: 16 BRPM | DIASTOLIC BLOOD PRESSURE: 85 MMHG | WEIGHT: 217.6 LBS | SYSTOLIC BLOOD PRESSURE: 131 MMHG | OXYGEN SATURATION: 98 % | HEIGHT: 68 IN | TEMPERATURE: 97.8 F | HEART RATE: 80 BPM

## 2021-04-19 DIAGNOSIS — G89.29 CHRONIC NONINTRACTABLE HEADACHE, UNSPECIFIED HEADACHE TYPE: ICD-10-CM

## 2021-04-19 DIAGNOSIS — J32.9 SINUSITIS, UNSPECIFIED CHRONICITY, UNSPECIFIED LOCATION: Primary | ICD-10-CM

## 2021-04-19 DIAGNOSIS — M35.00 SJOGREN'S SYNDROME WITHOUT EXTRAGLANDULAR INVOLVEMENT (HCC): ICD-10-CM

## 2021-04-19 DIAGNOSIS — R51.9 CHRONIC NONINTRACTABLE HEADACHE, UNSPECIFIED HEADACHE TYPE: ICD-10-CM

## 2021-04-19 DIAGNOSIS — M79.7 FIBROMYALGIA: ICD-10-CM

## 2021-04-19 DIAGNOSIS — M50.123 CERVICAL DISC DISORDER AT C6-C7 LEVEL WITH RADICULOPATHY: ICD-10-CM

## 2021-04-19 DIAGNOSIS — M54.2 NECK PAIN: ICD-10-CM

## 2021-04-19 DIAGNOSIS — R94.31 PROLONGED QT INTERVAL: ICD-10-CM

## 2021-04-19 DIAGNOSIS — G89.4 CHRONIC PAIN SYNDROME: ICD-10-CM

## 2021-04-19 PROCEDURE — 99214 OFFICE O/P EST MOD 30 MIN: CPT | Performed by: FAMILY MEDICINE

## 2021-04-19 RX ORDER — OXYCODONE HYDROCHLORIDE AND ACETAMINOPHEN 5; 325 MG/1; MG/1
1 TABLET ORAL EVERY 6 HOURS PRN
Qty: 90 TABLET | Refills: 0 | Status: SHIPPED | OUTPATIENT
Start: 2021-04-19 | End: 2021-05-18

## 2021-04-19 RX ORDER — LEVOFLOXACIN 500 MG/1
500 TABLET, FILM COATED ORAL DAILY
Qty: 20 TABLET | Refills: 0 | Status: SHIPPED | OUTPATIENT
Start: 2021-04-19 | End: 2021-04-30 | Stop reason: SINTOL

## 2021-04-19 RX ORDER — MOMETASONE FUROATE 50 UG/1
2 SPRAY, METERED NASAL DAILY
Qty: 17 G | Refills: 12 | Status: SHIPPED | OUTPATIENT
Start: 2021-04-19 | End: 2022-01-24

## 2021-04-19 RX ORDER — PREDNISONE 20 MG/1
40 TABLET ORAL DAILY
Qty: 10 TABLET | Refills: 0 | Status: SHIPPED | OUTPATIENT
Start: 2021-04-19 | End: 2021-04-30

## 2021-04-19 NOTE — PROGRESS NOTES
Chief Complaint  Headache and Sinus Problem    Subjective          History of Present Illness  Barbara Carter presents to [facility] for sinus problems and headache.    Patient states she has had her headache for 8 days. She states nothing is really helping it. She can get a little relief but not going away. She states it is making her grouchy. Patient states it is all sinus pressure with no drainage. She states it got better with antibiotic, amoxicillin, and prednisone from last visit March 24. She states it got worse again on April 11 and has not let up since. Have tried saline nasal gel, saline rinse/nedi pot, running humidifier, changed from claritin to allegra.     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 Very Similar feeling to when had covid in December. Having a hard time working due to constant headache. Also having gi symptoms and low appititie.  Worried could have again.   Also started Afrin Tuesday or Wednesday of last week. Stopped Flonase when started afrin.     Patient is also needing a prescription for Percocet, last fill was in July.  She continues to take occasionally at night when neck pain is more severe but typically pain has been controlled with tramadol.  She has been taking some for the headaches and will be running out in the next few days.    Denies additional concerns today.       Current Outpatient Medications on File Prior to Visit   Medication Sig   • Artificial Saliva (SALIVAMAX) pack Take 1 packet by mouth Daily. Dissolve 1 packet in 30 oz of water up to 10x daily. Swish half of solution x 1 minute, spit, then repeat.   • aspirin (ASPIRIN ADULT LOW DOSE) 81 MG EC tablet Take 1 tablet by mouth Daily.   • Carboxymethylcellul-Glycerin (REFRESH OPTIVE) 0.5-0.9 % solution Apply 1 drop to eye(s) as directed by provider As Needed.   • cevimeline (Evoxac) 30 MG capsule Take 1 capsule by mouth Every 8 (Eight) Hours.   • clonazePAM (KlonoPIN)  0.5 MG tablet TAKE 1 TABLET BY MOUTH EVERY 8 (EIGHT) HOURS AS NEEDED FOR ANXIETY.   • conjugated estrogens (Premarin) 0.625 MG/GM vaginal cream Insert  into the vagina 2 (Two) Times a Week.   • cyclobenzaprine (FLEXERIL) 10 MG tablet Take 1 tablet by mouth At Night As Needed for Muscle Spasms.   • diclofenac (VOLTAREN) 1 % gel gel 2 gm to small joints or 4 gm to large joints qid prn pain   • DULoxetine (Cymbalta) 60 MG capsule Take 1 capsule by mouth Daily.   • ergocalciferol (ERGOCALCIFEROL) 1.25 MG (60928 UT) capsule Take 1 capsule by mouth 2 (Two) Times a Week.   • fluticasone (FLONASE ALLERGY RELIEF) 50 MCG/ACT nasal spray 2 sprays into the nostril(s) as directed by provider Daily.   • gabapentin (NEURONTIN) 300 MG capsule Take 1 capsule by mouth 2 (Two) Times a Day. For burning   • hydroxychloroquine (PLAQUENIL) 200 MG tablet Take 2 tablets by mouth Daily.   • Hyoscyamine Sulfate SL (Levsin/SL) 0.125 MG sublingual tablet Place 1 tablet under the tongue 4 (Four) Times a Day As Needed (gut or bladder spasm).   • levothyroxine (Synthroid) 150 MCG tablet Take 1 tablet by mouth Daily.   • Lidocaine Viscous HCl (XYLOCAINE) 2 % solution Take 10 mL by mouth As Needed for Mild Pain . Dilute with water and gargle as needed   • Multiple Vitamins-Minerals (CENTRUM ADULTS) tablet Take 1 tablet by mouth Daily.   • naloxone (NARCAN) 4 MG/0.1ML nasal spray 1 spray into the nostril(s) as directed by provider As Needed (narcotic overdose).   • omeprazole (priLOSEC) 40 MG capsule Take 1 capsule by mouth Daily.   • ondansetron (Zofran) 8 MG tablet Take 1 tablet by mouth Every 8 (Eight) Hours As Needed for Nausea or Vomiting.   • promethazine (PHENERGAN) 12.5 MG tablet 1-2 q6h prn   • psyllium (Metamucil Smooth Texture) 58.6 % powder 1 teaspoon daily, increase by 1 teaspoon every 3 days to a max of 2 tablespoons   • traMADol (ULTRAM) 50 MG tablet Take 2 tablets by mouth Every 6 (Six) Hours As Needed for Moderate Pain  or Severe  "Pain .   • [DISCONTINUED] oxyCODONE-acetaminophen (Percocet) 5-325 MG per tablet Take 1 tablet by mouth Every 6 (Six) Hours As Needed for Severe Pain . 1 tablet q hs and q4h PRN severe pain   • pseudoephedrine (Sudafed 12 Hour) 120 MG 12 hr tablet Take 1 tablet by mouth Every 12 (Twelve) Hours.   • [DISCONTINUED] amoxicillin (AMOXIL) 500 MG capsule Take 1 capsule by mouth 3 (Three) Times a Day.   • [DISCONTINUED] predniSONE (DELTASONE) 20 MG tablet Take 2 tablets by mouth 2 (Two) Times a Day.     No current facility-administered medications on file prior to visit.       Objective   Vital Signs:   /85 (BP Location: Left arm, Patient Position: Sitting, Cuff Size: Adult)   Pulse 80   Temp 97.8 °F (36.6 °C) (Infrared)   Resp 16   Ht 172.7 cm (68\")   Wt 98.7 kg (217 lb 9.6 oz)   SpO2 98%   BMI 33.09 kg/m²     Physical Exam  Vitals and nursing note reviewed.   Constitutional:       General: She is not in acute distress.     Appearance: She is well-developed.   HENT:      Head: Normocephalic and atraumatic.      Comments: Mild tenderness to palpation of maxillary greater than frontal sinuses bilaterally.  Boggy nasal mucosa.  Cobblestoning of posterior pharynx.  Eyes:      General:         Right eye: No discharge.         Left eye: No discharge.      Pupils: Pupils are equal, round, and reactive to light.   Cardiovascular:      Rate and Rhythm: Normal rate and regular rhythm.      Heart sounds: No murmur heard.     Pulmonary:      Effort: Pulmonary effort is normal.      Breath sounds: Normal breath sounds. No wheezing or rales.   Musculoskeletal:         General: Normal range of motion.      Cervical back: Normal range of motion and neck supple. No tenderness.   Lymphadenopathy:      Cervical: No cervical adenopathy.   Skin:     General: Skin is warm and dry.      Findings: No rash.   Neurological:      Mental Status: She is alert and oriented to person, place, and time.                No results found for: " HGBA1C    Result Review :                       Assessment and Plan    Diagnoses and all orders for this visit:    1. Sinusitis, unspecified chronicity, unspecified location (Primary)  -     levoFLOXacin (Levaquin) 500 MG tablet; Take 1 tablet by mouth Daily.  Dispense: 20 tablet; Refill: 0  -     mometasone (Nasonex) 50 MCG/ACT nasal spray; 2 sprays into the nostril(s) as directed by provider Daily.  Dispense: 17 g; Refill: 12  -     predniSONE (DELTASONE) 20 MG tablet; Take 2 tablets by mouth Daily.  Dispense: 10 tablet; Refill: 0  -     COVID-19,[facility] ROUTINE, NP/OP SWAB IN TRANSPORT MEDIA OR ESWAB 72 HR TAT - Swab, Nasopharynx; Future    2. Prolonged QT interval    3. Sjogren's syndrome without extraglandular involvement (CMS/HCC)  -     predniSONE (DELTASONE) 20 MG tablet; Take 2 tablets by mouth Daily.  Dispense: 10 tablet; Refill: 0    4. Chronic nonintractable headache, unspecified headache type  -     COVID-19,[facility] ROUTINE, NP/OP SWAB IN TRANSPORT MEDIA OR ESWAB 72 HR TAT - Swab, Nasopharynx; Future    5. Cervical disc disorder at C6-C7 level with radiculopathy  -     oxyCODONE-acetaminophen (Percocet) 5-325 MG per tablet; Take 1 tablet by mouth Every 6 (Six) Hours As Needed for Severe Pain . 1 tablet q hs and q4h PRN severe pain  Dispense: 90 tablet; Refill: 0    6. Fibromyalgia  -     oxyCODONE-acetaminophen (Percocet) 5-325 MG per tablet; Take 1 tablet by mouth Every 6 (Six) Hours As Needed for Severe Pain . 1 tablet q hs and q4h PRN severe pain  Dispense: 90 tablet; Refill: 0    7. Neck pain  -     oxyCODONE-acetaminophen (Percocet) 5-325 MG per tablet; Take 1 tablet by mouth Every 6 (Six) Hours As Needed for Severe Pain . 1 tablet q hs and q4h PRN severe pain  Dispense: 90 tablet; Refill: 0    8. Chronic pain syndrome  -     oxyCODONE-acetaminophen (Percocet) 5-325 MG per tablet; Take 1 tablet by mouth Every 6 (Six) Hours As Needed for Severe Pain . 1 tablet q hs and q4h PRN severe pain  Dispense:  90 tablet; Refill: 0    Recurrent sinusitis and allergies  Have to avoid macrolide antibiotics due to prolonged QT interval.  Prescription for Levaquin, counseled about potential for tendinitis or tendon rupture.  Short course steroid, per patient's request testing for Covid, did advise being 8 days after symptom onset could potentially get a false negative however with previous Covid infection antibody testing would be positive and would not be of benefit.  If symptoms persist would recommend scheduling appointment with Brenda Wait her rheumatologist and consider CT of sinuses and potential referral to ENT.    Work note for 2 days may return on Thursday assuming Covid test is negative.  Medications Discontinued During This Encounter   Medication Reason   • amoxicillin (AMOXIL) 500 MG capsule *Therapy completed   • predniSONE (DELTASONE) 20 MG tablet *Therapy completed   • oxyCODONE-acetaminophen (Percocet) 5-325 MG per tablet Reorder         Follow Up     Return if symptoms worsen or fail to improve.    Patient was given instructions and counseling regarding her condition or for health maintenance advice. Please see specific information pulled into the AVS if appropriate.

## 2021-04-19 NOTE — TELEPHONE ENCOUNTER
Pharmacy is requesting an alternative for the mometasone furoate. They are requesting either fluticasone or nasacort.

## 2021-04-20 ENCOUNTER — TELEPHONE (OUTPATIENT)
Dept: FAMILY MEDICINE CLINIC | Facility: CLINIC | Age: 51
End: 2021-04-20

## 2021-04-20 LAB
LABCORP SARS-COV-2, NAA 2 DAY TAT: NORMAL
SARS-COV-2 RNA RESP QL NAA+PROBE: NOT DETECTED

## 2021-04-20 NOTE — TELEPHONE ENCOUNTER
Caller: Barbara Carter EMIR    Relationship: Self    Best call back number: [phone] (H)  What medications are you currently taking:   Current Outpatient Medications on File Prior to Visit   Medication Sig Dispense Refill   • Artificial Saliva (SALIVAMAX) pack Take 1 packet by mouth Daily. Dissolve 1 packet in 30 oz of water up to 10x daily. Swish half of solution x 1 minute, spit, then repeat.     • aspirin (ASPIRIN ADULT LOW DOSE) 81 MG EC tablet Take 1 tablet by mouth Daily.     • Carboxymethylcellul-Glycerin (REFRESH OPTIVE) 0.5-0.9 % solution Apply 1 drop to eye(s) as directed by provider As Needed.     • cevimeline (Evoxac) 30 MG capsule Take 1 capsule by mouth Every 8 (Eight) Hours. 90 capsule 1   • clonazePAM (KlonoPIN) 0.5 MG tablet TAKE 1 TABLET BY MOUTH EVERY 8 (EIGHT) HOURS AS NEEDED FOR ANXIETY. 90 tablet 1   • conjugated estrogens (Premarin) 0.625 MG/GM vaginal cream Insert  into the vagina 2 (Two) Times a Week. 30 g 12   • cyclobenzaprine (FLEXERIL) 10 MG tablet Take 1 tablet by mouth At Night As Needed for Muscle Spasms. 30 tablet 2   • diclofenac (VOLTAREN) 1 % gel gel 2 gm to small joints or 4 gm to large joints qid prn pain 300 g 3   • DULoxetine (Cymbalta) 60 MG capsule Take 1 capsule by mouth Daily. 30 capsule 5   • ergocalciferol (ERGOCALCIFEROL) 1.25 MG (30407 UT) capsule Take 1 capsule by mouth 2 (Two) Times a Week. 24 capsule 1   • fluticasone (FLONASE ALLERGY RELIEF) 50 MCG/ACT nasal spray 2 sprays into the nostril(s) as directed by provider Daily.     • gabapentin (NEURONTIN) 300 MG capsule Take 1 capsule by mouth 2 (Two) Times a Day. For burning 60 capsule 5   • hydroxychloroquine (PLAQUENIL) 200 MG tablet Take 2 tablets by mouth Daily.     • Hyoscyamine Sulfate SL (Levsin/SL) 0.125 MG sublingual tablet Place 1 tablet under the tongue 4 (Four) Times a Day As Needed (gut or bladder spasm). 30 each 0   • levoFLOXacin (Levaquin) 500 MG tablet Take 1 tablet by mouth Daily. 20 tablet 0    • levothyroxine (Synthroid) 150 MCG tablet Take 1 tablet by mouth Daily. 90 tablet 1   • Lidocaine Viscous HCl (XYLOCAINE) 2 % solution Take 10 mL by mouth As Needed for Mild Pain . Dilute with water and gargle as needed 100 mL 0   • mometasone (Nasonex) 50 MCG/ACT nasal spray 2 sprays into the nostril(s) as directed by provider Daily. 17 g 12   • Multiple Vitamins-Minerals (CENTRUM ADULTS) tablet Take 1 tablet by mouth Daily.     • naloxone (NARCAN) 4 MG/0.1ML nasal spray 1 spray into the nostril(s) as directed by provider As Needed (narcotic overdose). 1 each 1   • omeprazole (priLOSEC) 40 MG capsule Take 1 capsule by mouth Daily. 90 capsule 0   • ondansetron (Zofran) 8 MG tablet Take 1 tablet by mouth Every 8 (Eight) Hours As Needed for Nausea or Vomiting. 30 tablet 3   • oxyCODONE-acetaminophen (Percocet) 5-325 MG per tablet Take 1 tablet by mouth Every 6 (Six) Hours As Needed for Severe Pain . 1 tablet q hs and q4h PRN severe pain 90 tablet 0   • predniSONE (DELTASONE) 20 MG tablet Take 2 tablets by mouth Daily. 10 tablet 0   • promethazine (PHENERGAN) 12.5 MG tablet 1-2 q6h prn 30 tablet 0   • pseudoephedrine (Sudafed 12 Hour) 120 MG 12 hr tablet Take 1 tablet by mouth Every 12 (Twelve) Hours. 60 tablet 2   • psyllium (Metamucil Smooth Texture) 58.6 % powder 1 teaspoon daily, increase by 1 teaspoon every 3 days to a max of 2 tablespoons 425 g 12   • traMADol (ULTRAM) 50 MG tablet Take 2 tablets by mouth Every 6 (Six) Hours As Needed for Moderate Pain  or Severe Pain . 360 tablet 1     No current facility-administered medications on file prior to visit.        When did you start taking these medications: PATIENT HAS NOT STARTED TAKING MEDICATION YET, BUT WAS PRESCRIBED IT YESTERDAY    Which medication are you concerned about: levoFLOXacin (Levaquin) 500 MG tablet    Who prescribed you this medication: DR. MARIA ISABEL ISSA    What are your concerns:PATIENT STATED SHE WAS PRESCRIBED ANTIBIOTIC YESTERDAY BUT WAS  READING THE PAPER THAT WAS PRINTED OUT FROM THE PHARMACIST AND SHE JUST WOULD LIKE TO SPEAK TO SOMEONE ABOUT THE PRINTOUT    How long have you been taking these medications: HAVE NOT START TAKING, BUT PICKED MEDICATION UP YESTERDAY    How long have you had these concerns: SINCE LAST NIGHT.

## 2021-04-20 NOTE — TELEPHONE ENCOUNTER
Spoke with patient she is going to get to take the antibiotic. She was just worried because of the pharmacist but trust dr grace 100%

## 2021-04-20 NOTE — TELEPHONE ENCOUNTER
Spoke with patient and voiced the pharmacist printed out a bunch of papers. He told her that she should not take with her other meds. She was concerns with the QT and depression. It states anyone with those diagnosis should not take. She was just really concerned.     She started the steriods right away but still has headache. She states she is really sensitive to lights and every with her headache. She would like to know what you would like to do. She said you said something about a possible ct of her sinuses but she does not want to knox that. Please advise.

## 2021-04-20 NOTE — TELEPHONE ENCOUNTER
I picked Levaquin as there have been studies that indicate Cipro or Levaquin are less likely than other fluoroquinolones to prolong the QT interval and would not significantly prolong the mean QT interval over baseline.  If she is still not sure she could contact her cardiologist to get their opinion about the safety.  If she would like to wait on antibiotics we could order CT of sinuses and make referral to ENT for further evaluation.

## 2021-04-26 ENCOUNTER — TELEPHONE (OUTPATIENT)
Dept: FAMILY MEDICINE CLINIC | Facility: CLINIC | Age: 51
End: 2021-04-26

## 2021-04-26 NOTE — TELEPHONE ENCOUNTER
Patient called wanted to let  know that she stopped taking the antibiotic  prescribed her, she got really sick on Friday and Saturday. She woke up on Saturday and said her joints felt sore, like she couldn't move them. She didn't know if stopping the antibiotic was the right thing to do it was either that or go to the ER. Also wanted to let  know that her headache is better this morning but not gone completley.

## 2021-04-27 NOTE — TELEPHONE ENCOUNTER
Since most of her symptoms are currently rheumatologic and she is scheduled to discuss with rheumatologist tomorrow I do not have additional recommendation at this time other than to make sure she keeps appointment and follows recommendations from rheumatology.

## 2021-04-27 NOTE — TELEPHONE ENCOUNTER
Patient called and states she does not think it was an allergic reaction to the medication. She called the rheumatologist and the are going to do a video visit with her tomorrow.     She states she can not handle anything touching her even her clothes. She states her boobs hurt so bad they feel like they are going to explode. Patient states she feels like all her lymph nodes are swollen.    She states she has finished the steroid. She states the one reason she also stopped the levaquin over the weekend was because she got a pain in the back of her leg.     Please advise

## 2021-04-27 NOTE — TELEPHONE ENCOUNTER
Patient states that since she talked to us this morning she was brought home from work. She was vomiting. She states she vomited about 4-5 times and seems like she could not quit. She also says she had diarrhea about 3 times. I suggested maybe the that she should go to the er to make sure she is not getting dehydrated. Please advise.    Reason For Visit  Reason For Visit:   Patient presents for initial evaluation .   Chaperone: TORSTEN PANIAGUA is accompanied by her mother.   :  services not used.        History of Present Illness  HPI Free Text: Sofy has a 25 year history of depression and anxiety. She has had therapy on and off and has been managed well with medication over the last 20 years. She returns to therapy because her 44 year old boyfriend of 2 years is a recent double amputee. He is living with Torsten and her mother. She is struggling with caring for him and the direction of the relationship. She has been more tearful and irritable. Denies problems with sleep and SI.      Past Psych History  Past Psych History:   Mood Disorder: Major Depression   Anxiety Disorder: Generalized Anxiety Disorder      Past Psych Hospitalizations:    Past Psychiatric Medications:    Past Psychotherapy:        Past Psych Hx:   Current Diagnosis: moderate episode of recurring major depression   Prior Hospitalizations: several from 17 to 22 years old none since   Prior Suicide Attempts: several from [age]   Past Treatments: Therapy, hospitalization and medication   Prior Substance Abuse Treatments: denied      Review of Systems   Review of Symptoms:   Sleeping patterns: no change   Energy level: no concerns     Eating patterns/appetite/weight: no concerns     Eating disorder symptoms (restricting, binge eating, purging): no concerns     Memory: no concerns     Concentration: no concerns     Enjoyment: low   Self-worth: struggling   Manic symptoms: no concerns     Anxiety: ruminating restless   Agoraphobia: no concerns     Obsessions/Compulsions: no concerns     Hallucinations/Delusions: no concerns        Trama/Abuse   Trauma & Abuse:   Violence to Unrelated Others: none.   Domestic Violence: none.   Sexual Abuse: none.   Substance Abuse: none.   Child Abuse/Neglect: none.   Elder Abuse/Neglect: none.       Physical  Exam    Orientation: Oriented x3.   Attention/Concentration: normal concentrating ability.   Fund of knowledge: Patient displays adequate knowledge of current events, adequate fund of knowledge regarding past history and adequate fund of knowledge regarding vocabulary.   Observed mood and affect: depressed and was appropriate.   Observed appearance/grooming: tidy   The patient's speech exhibited a normal rate, a normal rhythm, normal tone and normal volume   Thought processes: The patient exhibited normal thought processes.   Associations: Evaluation of thought association showed no deficiency.   Thought Content: The patient presents no evidence of delusions, presents no evidence of hallucinations, presents no evidence of obsessions, presents no evidence of preoccupation with violent thoughts, denied suicidal ideation, presents no evidence of suicidal intent, presents no evidence of suicidal plans, presents no evidence of homicidal ideation, presents no evidence of homicidal intention, presents no evidence of homicidal plans and future oriented.         Judgment/Insight:The patient demonstrated intact judgment and intact insight.       Assessment  Axis I:   depression.    generalized anxiety disorder.   Axis IV:   No primary support group problems.   No social environment problems.   No social maladjustment.   No educational problems.   No occupational problems.   No housing problems.   No financial problems.   No problems with access to health care.   No problems with legal system interaction.    Axis V: Best level during past year was 4-Fair.       Discussion/Summary  Discussion Summary: Katlin is 41 years old and has lived with her mother her entire life. She is currently employed as an  at the [employer]. Sofy was the only child born to her parents. Her father was a bigamist and her mother left him when the patient was 11 years old. She was raised by her mother and maternal  grandmother. Katlin has much guilt over telling people her dad was dead and then he  in a car accident when she was 17. Katlin had many problems from 15 through 22 with depression and substance abuse.  She got a high school diploma and has worked as a /. She has been employed consistently but states she does leave job impulsively.  She had a long term boyfriend from [age] and then got  in her early 30's but they lived with her mother and  after two years. They have not formally . She has been with her current boyfriend 2 years. They live her mother. in the last 16 months her has had both legs amputated secondary to uncontrolled diabetes. Her mother currently helps him during the day. Both the patient and her mother are struggling with the relationship.  Sofy is emotional dependent on her mother. She wants help navigating through what to do with the relationship and would like her mother a part of the discussion.      Time  Face to Face Time:   Total face to face time spent with patient 60 minutes.      Signatures   Electronically signed by : MARIAJOSE UREÑA LCSW; Aug  9 2018  9:34AM CST

## 2021-04-30 ENCOUNTER — LAB (OUTPATIENT)
Dept: LAB | Facility: HOSPITAL | Age: 51
End: 2021-04-30

## 2021-04-30 ENCOUNTER — OFFICE VISIT (OUTPATIENT)
Dept: FAMILY MEDICINE CLINIC | Facility: CLINIC | Age: 51
End: 2021-04-30

## 2021-04-30 ENCOUNTER — HOSPITAL ENCOUNTER (OUTPATIENT)
Dept: CARDIOLOGY | Facility: HOSPITAL | Age: 51
Discharge: HOME | End: 2021-04-30

## 2021-04-30 ENCOUNTER — TELEPHONE (OUTPATIENT)
Dept: FAMILY MEDICINE CLINIC | Facility: CLINIC | Age: 51
End: 2021-04-30

## 2021-04-30 VITALS
WEIGHT: 216.2 LBS | SYSTOLIC BLOOD PRESSURE: 123 MMHG | TEMPERATURE: 97.3 F | BODY MASS INDEX: 32.77 KG/M2 | RESPIRATION RATE: 16 BRPM | HEART RATE: 92 BPM | DIASTOLIC BLOOD PRESSURE: 79 MMHG | OXYGEN SATURATION: 96 % | HEIGHT: 68 IN

## 2021-04-30 DIAGNOSIS — M79.89 PAIN AND SWELLING OF LOWER LEG, RIGHT: Primary | ICD-10-CM

## 2021-04-30 DIAGNOSIS — M79.661 PAIN AND SWELLING OF LOWER LEG, RIGHT: Primary | ICD-10-CM

## 2021-04-30 DIAGNOSIS — I82.461 ACUTE DEEP VEIN THROMBOSIS (DVT) OF CALF MUSCLE VEIN OF RIGHT LOWER EXTREMITY (HCC): ICD-10-CM

## 2021-04-30 DIAGNOSIS — M79.89 PAIN AND SWELLING OF LOWER LEG, RIGHT: ICD-10-CM

## 2021-04-30 DIAGNOSIS — M79.661 PAIN AND SWELLING OF LOWER LEG, RIGHT: ICD-10-CM

## 2021-04-30 LAB
APTT PPP: 23.4 SECONDS (ref 24–31)
BASOPHILS # BLD AUTO: 0.1 10*3/MM3 (ref 0–0.2)
BASOPHILS NFR BLD AUTO: 1.4 % (ref 0–1.5)
BH CV LOWER VASCULAR LEFT COMMON FEMORAL AUGMENT: NORMAL
BH CV LOWER VASCULAR LEFT COMMON FEMORAL COMPETENT: NORMAL
BH CV LOWER VASCULAR LEFT COMMON FEMORAL COMPRESS: NORMAL
BH CV LOWER VASCULAR LEFT COMMON FEMORAL PHASIC: NORMAL
BH CV LOWER VASCULAR LEFT COMMON FEMORAL SPONT: NORMAL
BH CV LOWER VASCULAR RIGHT COMMON FEMORAL AUGMENT: NORMAL
BH CV LOWER VASCULAR RIGHT COMMON FEMORAL COMPETENT: NORMAL
BH CV LOWER VASCULAR RIGHT COMMON FEMORAL COMPRESS: NORMAL
BH CV LOWER VASCULAR RIGHT COMMON FEMORAL PHASIC: NORMAL
BH CV LOWER VASCULAR RIGHT COMMON FEMORAL SPONT: NORMAL
BH CV LOWER VASCULAR RIGHT DISTAL FEMORAL COMPRESS: NORMAL
BH CV LOWER VASCULAR RIGHT GASTRONEMIUS COMPRESS: NORMAL
BH CV LOWER VASCULAR RIGHT GREATER SAPH AK COMPRESS: NORMAL
BH CV LOWER VASCULAR RIGHT GREATER SAPH BK COMPRESS: NORMAL
BH CV LOWER VASCULAR RIGHT LESSER SAPH COMPRESS: NORMAL
BH CV LOWER VASCULAR RIGHT MID FEMORAL AUGMENT: NORMAL
BH CV LOWER VASCULAR RIGHT MID FEMORAL COMPETENT: NORMAL
BH CV LOWER VASCULAR RIGHT MID FEMORAL COMPRESS: NORMAL
BH CV LOWER VASCULAR RIGHT MID FEMORAL PHASIC: NORMAL
BH CV LOWER VASCULAR RIGHT MID FEMORAL SPONT: NORMAL
BH CV LOWER VASCULAR RIGHT PERONEAL COMPRESS: NORMAL
BH CV LOWER VASCULAR RIGHT POPLITEAL AUGMENT: NORMAL
BH CV LOWER VASCULAR RIGHT POPLITEAL COMPETENT: NORMAL
BH CV LOWER VASCULAR RIGHT POPLITEAL COMPRESS: NORMAL
BH CV LOWER VASCULAR RIGHT POPLITEAL PHASIC: NORMAL
BH CV LOWER VASCULAR RIGHT POPLITEAL SPONT: NORMAL
BH CV LOWER VASCULAR RIGHT POSTERIOR TIBIAL COMPRESS: NORMAL
BH CV LOWER VASCULAR RIGHT PROXIMAL FEMORAL COMPRESS: NORMAL
BH CV LOWER VASCULAR RIGHT SAPHENOFEMORAL JUNCTION COMPRESS: NORMAL
D DIMER PPP FEU-MCNC: <0.19 MG/L (FEU) (ref 0–0.59)
DEPRECATED RDW RBC AUTO: 40.3 FL (ref 37–54)
EOSINOPHIL # BLD AUTO: 0.1 10*3/MM3 (ref 0–0.4)
EOSINOPHIL NFR BLD AUTO: 1.2 % (ref 0.3–6.2)
ERYTHROCYTE [DISTWIDTH] IN BLOOD BY AUTOMATED COUNT: 12.1 % (ref 12.3–15.4)
HCT VFR BLD AUTO: 43.9 % (ref 34–46.6)
HGB BLD-MCNC: 15.1 G/DL (ref 12–15.9)
INR PPP: <0.93 (ref 0.93–1.1)
LYMPHOCYTES # BLD AUTO: 1.6 10*3/MM3 (ref 0.7–3.1)
LYMPHOCYTES NFR BLD AUTO: 21.5 % (ref 19.6–45.3)
MAXIMAL PREDICTED HEART RATE: 170 BPM
MCH RBC QN AUTO: 32.3 PG (ref 26.6–33)
MCHC RBC AUTO-ENTMCNC: 34.3 G/DL (ref 31.5–35.7)
MCV RBC AUTO: 94 FL (ref 79–97)
MONOCYTES # BLD AUTO: 0.8 10*3/MM3 (ref 0.1–0.9)
MONOCYTES NFR BLD AUTO: 10.9 % (ref 5–12)
NEUTROPHILS NFR BLD AUTO: 4.7 10*3/MM3 (ref 1.7–7)
NEUTROPHILS NFR BLD AUTO: 65 % (ref 42.7–76)
NRBC BLD AUTO-RTO: 0.1 /100 WBC (ref 0–0.2)
PLATELET # BLD AUTO: 352 10*3/MM3 (ref 140–450)
PMV BLD AUTO: 8 FL (ref 6–12)
PROTHROMBIN TIME: 10.2 SECONDS (ref 9.6–11.7)
RBC # BLD AUTO: 4.67 10*6/MM3 (ref 3.77–5.28)
STRESS TARGET HR: 145 BPM
WBC # BLD AUTO: 7.3 10*3/MM3 (ref 3.4–10.8)

## 2021-04-30 PROCEDURE — 85379 FIBRIN DEGRADATION QUANT: CPT

## 2021-04-30 PROCEDURE — 85730 THROMBOPLASTIN TIME PARTIAL: CPT

## 2021-04-30 PROCEDURE — 99213 OFFICE O/P EST LOW 20 MIN: CPT | Performed by: FAMILY MEDICINE

## 2021-04-30 PROCEDURE — 93971 EXTREMITY STUDY: CPT

## 2021-04-30 PROCEDURE — 36415 COLL VENOUS BLD VENIPUNCTURE: CPT

## 2021-04-30 PROCEDURE — 85610 PROTHROMBIN TIME: CPT

## 2021-04-30 PROCEDURE — 85025 COMPLETE CBC W/AUTO DIFF WBC: CPT

## 2021-04-30 NOTE — TELEPHONE ENCOUNTER
Took a call from Selena she is calling from [facility], she wanted to let us know everything came back negative on patients duplex venous

## 2021-04-30 NOTE — TELEPHONE ENCOUNTER
Spoke with patient she is just going to come here she does not want to go to er and does not really think it is a blood clot.

## 2021-04-30 NOTE — TELEPHONE ENCOUNTER
Spoke with patient about results, and gave her directions from  on what to do, voiced understanding. She was wanting to know about her lab results as well.

## 2021-04-30 NOTE — TELEPHONE ENCOUNTER
"Pt calling in stating she is having a \"placido horse\" in her right calf area, but she fears it is a blood clot. Pt was upset there are no openings today and Dr. Dobbs won't be back in this office until Tuesday. I advised pt to go to an urgent care or the ER. Pt states she would try a [facility] first, and only go to the ER if she absolutely has to.  "

## 2021-05-06 DIAGNOSIS — F41.9 ANXIETY: ICD-10-CM

## 2021-05-06 RX ORDER — CLONAZEPAM 0.5 MG/1
0.5 TABLET ORAL EVERY 8 HOURS PRN
Qty: 90 TABLET | Refills: 1 | Status: SHIPPED | OUTPATIENT
Start: 2021-05-06 | End: 2021-09-24

## 2021-05-18 ENCOUNTER — OFFICE VISIT (OUTPATIENT)
Dept: FAMILY MEDICINE CLINIC | Facility: CLINIC | Age: 51
End: 2021-05-18

## 2021-05-18 VITALS
BODY MASS INDEX: 33.22 KG/M2 | TEMPERATURE: 97.2 F | HEART RATE: 67 BPM | WEIGHT: 219.2 LBS | SYSTOLIC BLOOD PRESSURE: 127 MMHG | DIASTOLIC BLOOD PRESSURE: 85 MMHG | HEIGHT: 68 IN | OXYGEN SATURATION: 95 %

## 2021-05-18 DIAGNOSIS — R20.8 NASAL BURNING: ICD-10-CM

## 2021-05-18 DIAGNOSIS — U09.9 POST-COVID-19 SYNDROME: ICD-10-CM

## 2021-05-18 DIAGNOSIS — K12.30 MUCOSITIS: ICD-10-CM

## 2021-05-18 DIAGNOSIS — M54.2 NECK PAIN: ICD-10-CM

## 2021-05-18 DIAGNOSIS — G89.29 CHRONIC INTRACTABLE HEADACHE, UNSPECIFIED HEADACHE TYPE: Primary | ICD-10-CM

## 2021-05-18 DIAGNOSIS — M35.00 SJOGREN'S SYNDROME WITHOUT EXTRAGLANDULAR INVOLVEMENT (HCC): ICD-10-CM

## 2021-05-18 DIAGNOSIS — R51.9 CHRONIC INTRACTABLE HEADACHE, UNSPECIFIED HEADACHE TYPE: Primary | ICD-10-CM

## 2021-05-18 DIAGNOSIS — M50.123 CERVICAL DISC DISORDER AT C6-C7 LEVEL WITH RADICULOPATHY: ICD-10-CM

## 2021-05-18 DIAGNOSIS — G89.4 CHRONIC PAIN SYNDROME: ICD-10-CM

## 2021-05-18 DIAGNOSIS — M79.7 FIBROMYALGIA: ICD-10-CM

## 2021-05-18 DIAGNOSIS — R11.0 NAUSEA: ICD-10-CM

## 2021-05-18 PROCEDURE — 99215 OFFICE O/P EST HI 40 MIN: CPT | Performed by: FAMILY MEDICINE

## 2021-05-18 RX ORDER — SUMATRIPTAN 100 MG/1
TABLET, FILM COATED ORAL
Qty: 12 TABLET | Refills: 1 | Status: SHIPPED | OUTPATIENT
Start: 2021-05-18 | End: 2021-09-22 | Stop reason: SDUPTHER

## 2021-05-18 RX ORDER — OXYCODONE HYDROCHLORIDE AND ACETAMINOPHEN 5; 325 MG/1; MG/1
1 TABLET ORAL EVERY 6 HOURS PRN
Qty: 90 TABLET | Refills: 0 | Status: SHIPPED | OUTPATIENT
Start: 2021-05-18 | End: 2021-07-06 | Stop reason: SDUPTHER

## 2021-05-18 RX ORDER — PROMETHAZINE HYDROCHLORIDE 25 MG/1
TABLET ORAL
Qty: 12 TABLET | Refills: 1 | Status: SHIPPED | OUTPATIENT
Start: 2021-05-18 | End: 2022-01-19 | Stop reason: SDUPTHER

## 2021-05-18 RX ORDER — ONDANSETRON HYDROCHLORIDE 8 MG/1
8 TABLET, FILM COATED ORAL EVERY 8 HOURS PRN
Qty: 30 TABLET | Refills: 3 | Status: SHIPPED | OUTPATIENT
Start: 2021-05-18 | End: 2021-09-22 | Stop reason: SDUPTHER

## 2021-05-18 NOTE — PROGRESS NOTES
Chief Complaint  Headache    Subjective          History of Present Illness  Barbara Carter presents today for frontal h/a x 5 weeks.  She states the headache is in her forehead bilaterally but is more frequent and more severe in her right forehead and around her eye.  She also reports a burning in her nose for 1 week it is on fire.  She states she had this symptom during her acute Covid infection as well.  Patient states she has been taking percocet to try to help with the headache and it takes it away but headache returns before time to take another percocet.  She was seen on 3/24/2021 and treated with a round of antibiotics and steroids she felt a little bit better for about 2 weeks but the headache did not resolve on 4/19/2021 she was started on another round of antibiotics and steroids, states the steroids did not help and she did not complete the antibiotics due to side effects.  She is continuing to take allergy medications, gabapentin for chronic pain, muscle relaxers body feel worn out.. No energy.  Called rheumatology today advised her to see me.  She is awaiting BX report for small nerve damage. Have not been able to return to work in past 4 weeks. .  Patient states she simply has not felt right since she had Covid diagnosed 12/29 she has been feeling more depressed and having brain fog and is feeling like she cannot function at all.      Current Outpatient Medications on File Prior to Visit   Medication Sig   • Artificial Saliva (SALIVAMAX) pack Take 1 packet by mouth Daily. Dissolve 1 packet in 30 oz of water up to 10x daily. Swish half of solution x 1 minute, spit, then repeat.   • aspirin (ASPIRIN ADULT LOW DOSE) 81 MG EC tablet Take 1 tablet by mouth Daily.   • Carboxymethylcellul-Glycerin (REFRESH OPTIVE) 0.5-0.9 % solution Apply 1 drop to eye(s) as directed by provider As Needed.   • cevimeline (Evoxac) 30 MG capsule Take 1 capsule by mouth Every 8 (Eight) Hours.   • clonazePAM (KlonoPIN) 0.5 MG  tablet TAKE 1 TABLET BY MOUTH EVERY 8 (EIGHT) HOURS AS NEEDED FOR ANXIETY.   • conjugated estrogens (Premarin) 0.625 MG/GM vaginal cream Insert  into the vagina 2 (Two) Times a Week.   • cyclobenzaprine (FLEXERIL) 10 MG tablet Take 1 tablet by mouth At Night As Needed for Muscle Spasms.   • diclofenac (VOLTAREN) 1 % gel gel 2 gm to small joints or 4 gm to large joints qid prn pain   • DULoxetine (Cymbalta) 60 MG capsule Take 1 capsule by mouth Daily.   • ergocalciferol (ERGOCALCIFEROL) 1.25 MG (35055 UT) capsule Take 1 capsule by mouth 2 (Two) Times a Week.   • fluticasone (FLONASE ALLERGY RELIEF) 50 MCG/ACT nasal spray 2 sprays into the nostril(s) as directed by provider Daily.   • gabapentin (NEURONTIN) 300 MG capsule Take 1 capsule by mouth 2 (Two) Times a Day. For burning   • hydroxychloroquine (PLAQUENIL) 200 MG tablet Take 2 tablets by mouth Daily.   • Hyoscyamine Sulfate SL (Levsin/SL) 0.125 MG sublingual tablet Place 1 tablet under the tongue 4 (Four) Times a Day As Needed (gut or bladder spasm).   • levothyroxine (Synthroid) 150 MCG tablet Take 1 tablet by mouth Daily.   • Lidocaine Viscous HCl (XYLOCAINE) 2 % solution Take 10 mL by mouth As Needed for Mild Pain . Dilute with water and gargle as needed   • mometasone (Nasonex) 50 MCG/ACT nasal spray 2 sprays into the nostril(s) as directed by provider Daily.   • Multiple Vitamins-Minerals (CENTRUM ADULTS) tablet Take 1 tablet by mouth Daily.   • naloxone (NARCAN) 4 MG/0.1ML nasal spray 1 spray into the nostril(s) as directed by provider As Needed (narcotic overdose).   • omeprazole (priLOSEC) 40 MG capsule Take 1 capsule by mouth Daily.   • pseudoephedrine (Sudafed 12 Hour) 120 MG 12 hr tablet Take 1 tablet by mouth Every 12 (Twelve) Hours.   • psyllium (Metamucil Smooth Texture) 58.6 % powder 1 teaspoon daily, increase by 1 teaspoon every 3 days to a max of 2 tablespoons   • traMADol (ULTRAM) 50 MG tablet Take 2 tablets by mouth Every 6 (Six) Hours As  "Needed for Moderate Pain  or Severe Pain .   • [DISCONTINUED] ondansetron (Zofran) 8 MG tablet Take 1 tablet by mouth Every 8 (Eight) Hours As Needed for Nausea or Vomiting.   • [DISCONTINUED] oxyCODONE-acetaminophen (Percocet) 5-325 MG per tablet Take 1 tablet by mouth Every 6 (Six) Hours As Needed for Severe Pain . 1 tablet q hs and q4h PRN severe pain   • [DISCONTINUED] promethazine (PHENERGAN) 12.5 MG tablet 1-2 q6h prn     No current facility-administered medications on file prior to visit.       Objective   Vital Signs:   /85 (BP Location: Left arm, Patient Position: Sitting, Cuff Size: Adult)   Pulse 67   Temp 97.2 °F (36.2 °C) (Infrared)   Ht 172.7 cm (67.99\")   Wt 99.4 kg (219 lb 3.2 oz)   SpO2 95%   BMI 33.34 kg/m²     Physical Exam  Vitals and nursing note reviewed.   Constitutional:       General: She is not in acute distress.     Appearance: She is well-developed. She is obese.      Comments: Somewhat ill appearing   HENT:      Head: Normocephalic and atraumatic.      Comments: Minimal tenderness to palpation of frontal sinuses bilaterally.  No significant tenderness to palpation of maxillary or ethmoid sinuses.  Boggy nasal mucosa.  Cobblestoning of posterior pharynx.     Right Ear: Tympanic membrane, ear canal and external ear normal. There is no impacted cerumen.      Left Ear: Tympanic membrane, ear canal and external ear normal. There is no impacted cerumen.      Nose: No rhinorrhea.      Comments: Nasal mucosa is beefy red and swollen  Eyes:      General: No scleral icterus.        Right eye: No discharge.         Left eye: No discharge.      Conjunctiva/sclera: Conjunctivae normal.      Pupils: Pupils are equal, round, and reactive to light.   Cardiovascular:      Rate and Rhythm: Normal rate and regular rhythm.      Heart sounds: No murmur heard.     Pulmonary:      Effort: Pulmonary effort is normal.      Breath sounds: Normal breath sounds. No wheezing or rales.   Abdominal:      " Palpations: Abdomen is soft.      Tenderness: There is no abdominal tenderness.   Musculoskeletal:         General: Normal range of motion.      Cervical back: Normal range of motion and neck supple. No tenderness.   Lymphadenopathy:      Cervical: No cervical adenopathy.   Skin:     General: Skin is warm and dry.      Findings: No rash.   Neurological:      Mental Status: She is alert and oriented to person, place, and time.          Lab Results   Component Value Date    WBC 7.30 04/30/2021    RBC 4.67 04/30/2021    HCT 43.9 04/30/2021    MCV 94.0 04/30/2021    MCH 32.3 04/30/2021    INR <0.93 (L) 04/30/2021                 Assessment and Plan    Diagnoses and all orders for this visit:    1. Chronic intractable headache, unspecified headache type (Primary)  -     CT Head Without Contrast; Future  -     Ambulatory Referral to Neurology  -     SUMAtriptan (Imitrex) 100 MG tablet; Take one tablet at onset of headache. May repeat dose one time in 2 hours if headache not relieved.  Dispense: 12 tablet; Refill: 1  -     ondansetron (Zofran) 8 MG tablet; Take 1 tablet by mouth Every 8 (Eight) Hours As Needed for Nausea or Vomiting.  Dispense: 30 tablet; Refill: 3  -     promethazine (PHENERGAN) 25 MG tablet; 1-2 q6h prn, likely sedating  Dispense: 12 tablet; Refill: 1    2. Chronic pain syndrome  -     oxyCODONE-acetaminophen (Percocet) 5-325 MG per tablet; Take 1 tablet by mouth Every 6 (Six) Hours As Needed for Severe Pain . 1 tablet q hs and q4h PRN severe pain  Dispense: 90 tablet; Refill: 0    3. Cervical disc disorder at C6-C7 level with radiculopathy  -     oxyCODONE-acetaminophen (Percocet) 5-325 MG per tablet; Take 1 tablet by mouth Every 6 (Six) Hours As Needed for Severe Pain . 1 tablet q hs and q4h PRN severe pain  Dispense: 90 tablet; Refill: 0    4. Fibromyalgia  -     oxyCODONE-acetaminophen (Percocet) 5-325 MG per tablet; Take 1 tablet by mouth Every 6 (Six) Hours As Needed for Severe Pain . 1 tablet q hs  and q4h PRN severe pain  Dispense: 90 tablet; Refill: 0    5. Sjogren's syndrome without extraglandular involvement (CMS/HCC)    6. 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 Mucositis  -     mupirocin (Bactroban) 2 % ointment; Pea size amount into each nostril bid  Dispense: 22 g; Refill: 1    8. Nausea  -     ondansetron (Zofran) 8 MG tablet; Take 1 tablet by mouth Every 8 (Eight) Hours As Needed for Nausea or Vomiting.  Dispense: 30 tablet; Refill: 3  -     promethazine (PHENERGAN) 25 MG tablet; 1-2 q6h prn, likely sedating  Dispense: 12 tablet; Refill: 1    9. Neck pain  -     oxyCODONE-acetaminophen (Percocet) 5-325 MG per tablet; Take 1 tablet by mouth Every 6 (Six) Hours As Needed for Severe Pain . 1 tablet q hs and q4h PRN severe pain  Dispense: 90 tablet; Refill: 0    10. Nasal burning    Persistent headache, fatigue, concentration issues possibly related to Covid infection 4/2 months ago.  Obtaining head CT to rule out other pathology and referring to neurology for further evaluation.  Headaches somewhat consistent with migraines without history of migraine she has tried treatment of sinus, traditional pain treatment, neuroleptics, muscle relaxers with no relief.  Stressed need to make sure she is getting adequate hydration and we will try Imitrex.  Mucositis/nasal burning possibly late effect of previous Covid infection, we will treat with Bactroban.  If does not improve would recommend referral to ENT for further evaluation.  Medications Discontinued During This Encounter   Medication Reason   • promethazine (PHENERGAN) 12.5 MG tablet Reorder   • ondansetron (Zofran) 8 MG tablet Reorder   • oxyCODONE-acetaminophen (Percocet) 5-325 MG per tablet        I spent 65 minutes caring for Barbara on this date of service. This time includes time spent by me in the following activities:preparing for the visit, reviewing tests, performing a medically  appropriate examination and/or evaluation , counseling and educating the patient/family/caregiver, ordering medications, tests, or procedures, referring and communicating with other health care professionals  and documenting information in the medical record  Follow Up     Return if symptoms worsen or fail to improve.    Patient was given instructions and counseling regarding her condition or for health maintenance advice. Please see specific information pulled into the AVS if appropriate.

## 2021-05-21 ENCOUNTER — TELEPHONE (OUTPATIENT)
Dept: FAMILY MEDICINE CLINIC | Facility: CLINIC | Age: 51
End: 2021-05-21

## 2021-05-22 ENCOUNTER — APPOINTMENT (OUTPATIENT)
Dept: CT IMAGING | Facility: HOSPITAL | Age: 51
End: 2021-05-22

## 2021-05-24 NOTE — TELEPHONE ENCOUNTER
Yes, if mupirocin antibiotic cream in her nose is helping with the headaches, she can hold off on getting the CT scan at this time.  Please ask if she has had opportunity to take Imitrex and if this made any difference as well.

## 2021-05-24 NOTE — TELEPHONE ENCOUNTER
Spoke with patient and yes she states the imtrex is working. She takes it when her headaches are really bad.

## 2021-06-04 DIAGNOSIS — R51.9 CHRONIC INTRACTABLE HEADACHE, UNSPECIFIED HEADACHE TYPE: ICD-10-CM

## 2021-06-04 DIAGNOSIS — U09.9 POST-COVID-19 SYNDROME: ICD-10-CM

## 2021-06-04 DIAGNOSIS — G89.29 CHRONIC INTRACTABLE HEADACHE, UNSPECIFIED HEADACHE TYPE: ICD-10-CM

## 2021-06-07 ENCOUNTER — TELEPHONE (OUTPATIENT)
Dept: FAMILY MEDICINE CLINIC | Facility: CLINIC | Age: 51
End: 2021-06-07

## 2021-06-08 ENCOUNTER — TELEPHONE (OUTPATIENT)
Dept: FAMILY MEDICINE CLINIC | Facility: CLINIC | Age: 51
End: 2021-06-08

## 2021-06-08 NOTE — TELEPHONE ENCOUNTER
Caller: Barbara Carter    Relationship to patient: Self    Best call back number: [phone]    Patient is needing: PATIENT IS CALLING IN REGARDS TO WANTING TO KNOW IF MD ISSA IS OKAY WITH HER RECEIVING THE COVID 19 VACCINE NOW DUE TO HER FEELING BETTER AND NOT HAVING ALL OF THE MEDICAL ISSUES SHE HAD PRIOR TO OPTING OUT OF THE VACCINE.     PLEASE ADVISE

## 2021-06-17 DIAGNOSIS — E03.9 ACQUIRED HYPOTHYROIDISM: ICD-10-CM

## 2021-06-17 DIAGNOSIS — K21.9 GASTROESOPHAGEAL REFLUX DISEASE, UNSPECIFIED WHETHER ESOPHAGITIS PRESENT: ICD-10-CM

## 2021-06-17 RX ORDER — OMEPRAZOLE 40 MG/1
40 CAPSULE, DELAYED RELEASE ORAL DAILY
Qty: 90 CAPSULE | Refills: 0 | Status: SHIPPED | OUTPATIENT
Start: 2021-06-17 | End: 2021-09-14 | Stop reason: SDUPTHER

## 2021-06-17 RX ORDER — LEVOTHYROXINE SODIUM 0.15 MG/1
150 TABLET ORAL EVERY 24 HOURS
Qty: 90 TABLET | Refills: 0 | Status: SHIPPED | OUTPATIENT
Start: 2021-06-17 | End: 2021-09-14 | Stop reason: SDUPTHER

## 2021-06-18 ENCOUNTER — TELEPHONE (OUTPATIENT)
Dept: FAMILY MEDICINE CLINIC | Facility: CLINIC | Age: 51
End: 2021-06-18

## 2021-06-18 DIAGNOSIS — E03.9 ACQUIRED HYPOTHYROIDISM: Primary | ICD-10-CM

## 2021-06-18 NOTE — TELEPHONE ENCOUNTER
Renewing Prilosec and levothyroxine as requested however last thyroid test in November, thyroid was not quite in the normal range.  Please order a TSH, T3 and T4 and have her do these labs prior to next refill

## 2021-06-18 NOTE — TELEPHONE ENCOUNTER
HUB TO READ    MY CHART MESSAGE    Renewing Prilosec and levothyroxine as requested however last thyroid test in November, thyroid was not quite in the normal range.  Please order a TSH, T3 and T4 and have her do these labs prior to next refill

## 2021-06-21 DIAGNOSIS — E03.9 ACQUIRED HYPOTHYROIDISM: Primary | ICD-10-CM

## 2021-06-29 ENCOUNTER — TELEPHONE (OUTPATIENT)
Dept: FAMILY MEDICINE CLINIC | Facility: CLINIC | Age: 51
End: 2021-06-29

## 2021-06-29 DIAGNOSIS — R51.9 CHRONIC INTRACTABLE HEADACHE, UNSPECIFIED HEADACHE TYPE: Primary | ICD-10-CM

## 2021-06-29 DIAGNOSIS — G89.29 CHRONIC INTRACTABLE HEADACHE, UNSPECIFIED HEADACHE TYPE: Primary | ICD-10-CM

## 2021-06-29 NOTE — TELEPHONE ENCOUNTER
Caller: Barbara Carter    Relationship: Self    Best call back number: [phone]       PATIENT IS CALLING IN SHE STATES SHE WENT TO SEE NEUROLOGIST AND SHE WAS NOT VERY SATISFIED WITH HIM AND IS WANTING TO KNOW ABOUT GETTING A SECOND OPINION WITH A DIFFERENT NEUROLOGIST.

## 2021-07-06 ENCOUNTER — OFFICE VISIT (OUTPATIENT)
Dept: FAMILY MEDICINE CLINIC | Facility: CLINIC | Age: 51
End: 2021-07-06

## 2021-07-06 VITALS
BODY MASS INDEX: 31.37 KG/M2 | RESPIRATION RATE: 16 BRPM | HEART RATE: 81 BPM | DIASTOLIC BLOOD PRESSURE: 72 MMHG | TEMPERATURE: 98.9 F | SYSTOLIC BLOOD PRESSURE: 112 MMHG | OXYGEN SATURATION: 96 % | WEIGHT: 207 LBS | HEIGHT: 68 IN

## 2021-07-06 DIAGNOSIS — R51.9 FREQUENT HEADACHES: ICD-10-CM

## 2021-07-06 DIAGNOSIS — K13.79 MOUTH PAIN: Primary | ICD-10-CM

## 2021-07-06 DIAGNOSIS — M79.7 FIBROMYALGIA: ICD-10-CM

## 2021-07-06 DIAGNOSIS — U09.9 POST-COVID-19 SYNDROME: ICD-10-CM

## 2021-07-06 DIAGNOSIS — M50.123 CERVICAL DISC DISORDER AT C6-C7 LEVEL WITH RADICULOPATHY: ICD-10-CM

## 2021-07-06 DIAGNOSIS — G89.4 CHRONIC PAIN SYNDROME: ICD-10-CM

## 2021-07-06 PROCEDURE — 99214 OFFICE O/P EST MOD 30 MIN: CPT | Performed by: FAMILY MEDICINE

## 2021-07-06 RX ORDER — OXYCODONE HYDROCHLORIDE AND ACETAMINOPHEN 5; 325 MG/1; MG/1
1 TABLET ORAL EVERY 6 HOURS PRN
Qty: 90 TABLET | Refills: 0 | Status: SHIPPED | OUTPATIENT
Start: 2021-07-06 | End: 2021-11-02 | Stop reason: SDUPTHER

## 2021-07-06 RX ORDER — LIDOCAINE HYDROCHLORIDE 20 MG/ML
10 SOLUTION OROPHARYNGEAL AS NEEDED
Qty: 100 ML | Refills: 0 | Status: SHIPPED | OUTPATIENT
Start: 2021-07-06

## 2021-07-06 NOTE — PROGRESS NOTES
Chief Complaint  Oral Pain    History of Present Illness  Barabra Carter presents today for oral pain.     Patient states she is having oral pain that started on Thursday night worse since Friday. She tried to get into the dentist but her dentist is out this week. She called his on call people and they told her they would just give her antibiotics. Patient states the pain in her mouth moves around it is not just in one spot.   Have tried salt water gargles, biotine, Taste has not entirely returnd since covid.  Have had nose burning since covid.     Did have a similar pain in 2017.     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     Subjective            Current Outpatient Medications on File Prior to Visit   Medication Sig   • Artificial Saliva (SALIVAMAX) pack Take 1 packet by mouth Daily. Dissolve 1 packet in 30 oz of water up to 10x daily. Swish half of solution x 1 minute, spit, then repeat.   • aspirin (ASPIRIN ADULT LOW DOSE) 81 MG EC tablet Take 1 tablet by mouth Daily.   • Carboxymethylcellul-Glycerin (REFRESH OPTIVE) 0.5-0.9 % solution Apply 1 drop to eye(s) as directed by provider As Needed.   • cevimeline (Evoxac) 30 MG capsule Take 1 capsule by mouth Every 8 (Eight) Hours.   • clonazePAM (KlonoPIN) 0.5 MG tablet TAKE 1 TABLET BY MOUTH EVERY 8 (EIGHT) HOURS AS NEEDED FOR ANXIETY.   • conjugated estrogens (Premarin) 0.625 MG/GM vaginal cream Insert  into the vagina 2 (Two) Times a Week.   • cyclobenzaprine (FLEXERIL) 10 MG tablet Take 1 tablet by mouth At Night As Needed for Muscle Spasms.   • diclofenac (VOLTAREN) 1 % gel gel 2 gm to small joints or 4 gm to large joints qid prn pain   • DULoxetine (Cymbalta) 60 MG capsule Take 1 capsule by mouth Daily.   • ergocalciferol (ERGOCALCIFEROL) 1.25 MG (39663 UT) capsule Take 1 capsule by mouth 2 (Two) Times a Week.   • fluticasone (FLONASE ALLERGY RELIEF) 50 MCG/ACT nasal spray 2 sprays into the nostril(s) as directed by provider Daily.    • gabapentin (NEURONTIN) 300 MG capsule Take 1 capsule by mouth 2 (Two) Times a Day. For burning (Patient taking differently: Take 600 mg by mouth 3 (Three) Times a Day. For burning)   • hydroxychloroquine (PLAQUENIL) 200 MG tablet Take 2 tablets by mouth Daily.   • Hyoscyamine Sulfate SL (Levsin/SL) 0.125 MG sublingual tablet Place 1 tablet under the tongue 4 (Four) Times a Day As Needed (gut or bladder spasm).   • levothyroxine (Synthroid) 150 MCG tablet Take 1 tablet by mouth Daily.   • mometasone (Nasonex) 50 MCG/ACT nasal spray 2 sprays into the nostril(s) as directed by provider Daily.   • Multiple Vitamins-Minerals (CENTRUM ADULTS) tablet Take 1 tablet by mouth Daily.   • mupirocin (Bactroban) 2 % ointment Pea size amount into each nostril bid   • naloxone (NARCAN) 4 MG/0.1ML nasal spray 1 spray into the nostril(s) as directed by provider As Needed (narcotic overdose).   • omeprazole (priLOSEC) 40 MG capsule Take 1 capsule by mouth Daily.   • ondansetron (Zofran) 8 MG tablet Take 1 tablet by mouth Every 8 (Eight) Hours As Needed for Nausea or Vomiting.   • promethazine (PHENERGAN) 25 MG tablet 1-2 q6h prn, likely sedating   • pseudoephedrine (Sudafed 12 Hour) 120 MG 12 hr tablet Take 1 tablet by mouth Every 12 (Twelve) Hours.   • psyllium (Metamucil Smooth Texture) 58.6 % powder 1 teaspoon daily, increase by 1 teaspoon every 3 days to a max of 2 tablespoons   • SUMAtriptan (Imitrex) 100 MG tablet Take one tablet at onset of headache. May repeat dose one time in 2 hours if headache not relieved.   • traMADol (ULTRAM) 50 MG tablet Take 2 tablets by mouth Every 6 (Six) Hours As Needed for Moderate Pain  or Severe Pain .   • [DISCONTINUED] Lidocaine Viscous HCl (XYLOCAINE) 2 % solution Take 10 mL by mouth As Needed for Mild Pain . Dilute with water and gargle as needed   • [DISCONTINUED] oxyCODONE-acetaminophen (Percocet) 5-325 MG per tablet Take 1 tablet by mouth Every 6 (Six) Hours As Needed for Severe Pain .  "1 tablet q hs and q4h PRN severe pain     No current facility-administered medications on file prior to visit.       Objective   Vital Signs:   /72 (BP Location: Left arm, Patient Position: Sitting, Cuff Size: Adult)   Pulse 81   Temp 98.9 °F (37.2 °C) (Infrared)   Resp 16   Ht 172.7 cm (68\")   Wt 93.9 kg (207 lb)   SpO2 96%   BMI 31.47 kg/m²     Physical Exam  Vitals and nursing note reviewed.   Constitutional:       General: She is not in acute distress.     Appearance: She is well-developed.   HENT:      Head: Normocephalic and atraumatic.      Mouth/Throat:      Comments: Visual and tactile exam of mouth.  No tenderness to palpation of the gums or teeth.  There is no evidence of infection, abscess, severe gingivitis or other mucosal lesions.  Mucosa is somewhat dry.  Cardiovascular:      Rate and Rhythm: Normal rate and regular rhythm.      Heart sounds: No murmur heard.     Pulmonary:      Effort: Pulmonary effort is normal.      Breath sounds: Normal breath sounds. No wheezing.   Musculoskeletal:         General: Normal range of motion.   Skin:     General: Skin is warm and dry.      Findings: No rash.   Neurological:      Mental Status: She is alert and oriented to person, place, and time.          Lab Results   Component Value Date    WBC 7.30 04/30/2021    RBC 4.67 04/30/2021    HCT 43.9 04/30/2021    MCV 94.0 04/30/2021    MCH 32.3 04/30/2021    INR <0.93 (L) 04/30/2021        No results found for: HGBA1C               Assessment and Plan    Diagnoses and all orders for this visit:    1. Mouth pain (Primary)  -     Lidocaine Viscous HCl (XYLOCAINE) 2 % solution; Take 10 mL by mouth As Needed for Moderate Pain . Dilute with salt water swish and spit up to 6 times a day  Dispense: 100 mL; Refill: 0    2. Chronic pain syndrome  -     oxyCODONE-acetaminophen (Percocet) 5-325 MG per tablet; Take 1 tablet by mouth Every 6 (Six) Hours As Needed for Severe Pain . 1 tablet q hs and q4h PRN severe pain  " Dispense: 90 tablet; Refill: 0    3. Cervical disc disorder at C6-C7 level with radiculopathy  -     oxyCODONE-acetaminophen (Percocet) 5-325 MG per tablet; Take 1 tablet by mouth Every 6 (Six) Hours As Needed for Severe Pain . 1 tablet q hs and q4h PRN severe pain  Dispense: 90 tablet; Refill: 0    4. Fibromyalgia  -     oxyCODONE-acetaminophen (Percocet) 5-325 MG per tablet; Take 1 tablet by mouth Every 6 (Six) Hours As Needed for Severe Pain . 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 Frequent headaches      Mouth pain of uncertain etiology sounds neurologic, possible post Covid syndrome.  Trial of viscous lidocaine swish and swallow, renewing Percocet if needed for more severe pain that is keeping her awake at night.  She is to contact her rheumatologist Brenda olivo and schedule appointment with dentist additionally she has an upcoming appointment with Dr. Seipel neurologist for second opinion.      Medications Discontinued During This Encounter   Medication Reason   • Lidocaine Viscous HCl (XYLOCAINE) 2 % solution Reorder   • oxyCODONE-acetaminophen (Percocet) 5-325 MG per tablet Reorder         Follow Up     Return if symptoms worsen or fail to improve.    Patient was given instructions and counseling regarding her condition or for health maintenance advice. Please see specific information pulled into the AVS if appropriate.

## 2021-09-14 DIAGNOSIS — K21.9 GASTROESOPHAGEAL REFLUX DISEASE, UNSPECIFIED WHETHER ESOPHAGITIS PRESENT: ICD-10-CM

## 2021-09-14 DIAGNOSIS — M50.123 CERVICAL DISC DISORDER AT C6-C7 LEVEL WITH RADICULOPATHY: ICD-10-CM

## 2021-09-14 DIAGNOSIS — E03.9 ACQUIRED HYPOTHYROIDISM: ICD-10-CM

## 2021-09-14 RX ORDER — GABAPENTIN 300 MG/1
600 CAPSULE ORAL 3 TIMES DAILY
Qty: 180 CAPSULE | Refills: 5 | Status: SHIPPED | OUTPATIENT
Start: 2021-09-14 | End: 2022-05-09 | Stop reason: SDUPTHER

## 2021-09-14 RX ORDER — OMEPRAZOLE 40 MG/1
40 CAPSULE, DELAYED RELEASE ORAL DAILY
Qty: 30 CAPSULE | Refills: 5 | Status: SHIPPED | OUTPATIENT
Start: 2021-09-14 | End: 2022-04-11 | Stop reason: SDUPTHER

## 2021-09-14 RX ORDER — LEVOTHYROXINE SODIUM 0.15 MG/1
150 TABLET ORAL EVERY 24 HOURS
Qty: 30 TABLET | Refills: 5 | Status: SHIPPED | OUTPATIENT
Start: 2021-09-14 | End: 2022-04-11 | Stop reason: SDUPTHER

## 2021-09-14 NOTE — TELEPHONE ENCOUNTER
Patient is requesting refills on the following meds:    gabapentin (NEURONTIN) 300 MG capsule    omeprazole (priLOSEC) 40 MG capsule    levothyroxine (Synthroid) 150 MCG tablet

## 2021-09-20 NOTE — PROGRESS NOTES
Subjective: headache    Patient ID: Barbara Carter is a 51 y.o. female.    CHIEF COMPLAINT:headache    History of Present Illness   Ms Carter is a very pleasant 51-year-old  female who presented alone today for an evaluation of migraine furred by Dr. Griffin.  The patient reports that she did not have a history of migraine or any headaches until Covid.  She reports these headaches have continued after Covid has resolved.  She has had a CT of the head on June 2 that was normal.  She reports approximately 2 days prior to Covid infection she smelled an odd chemical type burning in her nose then awoken with no sense of smell 1 or 2 days later.  The headache started after that.  She states typically the headaches will start with an electrical type smell and some burning in her nose then pain primarily over her right eye to right temporal which is severe 8 of 10 hard ache sharp and burning.  She states sometimes these are associated with nausea, phono and photosensitivity.  She has been given Imitrex by her primary care physician but has not started the medication.    She has a history of Sjogren's syndrome and takes tramadol nightly for leg pain.  She also reports she has been dosing her Percocet to try to control the headaches.  She reports typically she will take a Percocet and 2 Advil to try to control the headaches.  She was educated on rebound headache and notified that Percocet, tramadol, aspirin, Tylenol, Excedrin Migraine, Advil if taken on a daily basis can cause a headache syndrome that will never resolve.  She reports she can discontinue all of those medicines except for the tramadol at night due to leg discomfort with her autoimmune disorder.    She was educated on how to take Imitrex: Take One 100 mg tablet at the onset of migraine, it can be repeated after 2 hours if the migraine still continues, maximum of 2 tablets in 24 hours or using the medication 3 days a week.  If Imitrex is taken every day  it can also cause rebound headaches.    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      Migraine headaches  Complaint:  Migraine headaches post Covid  Onset:3-2021  Aura: Electrical   Location: right temporal and right nose   Quality: pressure, the nose is more electrical and sharp, burning,    Severity: 8  Duration: sometimes two weeks  Frequency: 3 to 4/week now in March she had headache for 52 days  Timing: varies  Context: Post Covid  Modifying factors: what makes it better--rest, meds, heating pad on head  Associated Signs and symptoms: Nausea, Vomiting Phono or Photo sensitivity.  Current meds: pain meds/percocet/ibuprofen          6-2-2021  CT Head  Impression:  No acute intrcanial abnormality            The following portions of the patient's history were reviewed and updated as appropriate: allergies, current medications, past family history, past medical history, past social history, past surgical history and problem list.      Family History   Problem Relation Age of Onset   • Thyroid disease Mother    • Diabetes Mother         DMII   • Heart failure Mother    • Hypertension Mother    • Stroke Father    • Colon cancer Paternal Grandfather        Past Medical History:   Diagnosis Date   • Anxiety    • Arthritis    • Cluster headache    • Depression    • Fibromyalgia    • Hypothyroidism    • Peripheral neuropathy    • Sjogren's syndrome (CMS/[facility])        Social History     Socioeconomic History   • Marital status:      Spouse name: Not on file   • Number of children: Not on file   • Years of education: Not on file   • Highest education level: Not on file   Tobacco Use   • Smoking status: Never Smoker   • Smokeless tobacco: Never Used    Vaping Use   • Vaping Use: Never used   Substance and Sexual Activity   • Alcohol use: No   • Drug use: No   • Sexual activity: Yes     Partners: Male     Birth control/protection: None         Current Outpatient Medications:   •  cevimeline (Evoxac) 30 MG capsule, Take 1 capsule by mouth Every 8 (Eight) Hours., Disp: 90 capsule, Rfl: 1  •  clonazePAM (KlonoPIN) 0.5 MG tablet, TAKE 1 TABLET BY MOUTH EVERY 8 (EIGHT) HOURS AS NEEDED FOR ANXIETY., Disp: 90 tablet, Rfl: 1  •  DULoxetine (Cymbalta) 60 MG capsule, Take 1 capsule by mouth Daily., Disp: 30 capsule, Rfl: 5  •  gabapentin (NEURONTIN) 300 MG capsule, Take 2 capsules by mouth 3 (Three) Times a Day. For burning, Disp: 180 capsule, Rfl: 5  •  hydroxychloroquine (PLAQUENIL) 200 MG tablet, Take 2 tablets by mouth Daily., Disp: , Rfl:   •  levothyroxine (Synthroid) 150 MCG tablet, Take 1 tablet by mouth Daily., Disp: 30 tablet, Rfl: 5  •  Multiple Vitamins-Minerals (CENTRUM ADULTS) tablet, Take 1 tablet by mouth Daily., Disp: , Rfl:   •  omeprazole (priLOSEC) 40 MG capsule, Take 1 capsule by mouth Daily., Disp: 30 capsule, Rfl: 5  •  oxyCODONE-acetaminophen (Percocet) 5-325 MG per tablet, Take 1 tablet by mouth Every 6 (Six) Hours As Needed for Severe Pain . 1 tablet q hs and q4h PRN severe pain, Disp: 90 tablet, Rfl: 0  •  Artificial Saliva (SALIVAMAX) pack, Take 1 packet by mouth Daily. Dissolve 1 packet in 30 oz of water up to 10x daily. Swish half of solution x 1 minute, spit, then repeat., Disp: , Rfl:   •  aspirin (ASPIRIN ADULT LOW DOSE) 81 MG EC tablet, Take 1 tablet by mouth Daily., Disp: , Rfl:   •  Carboxymethylcellul-Glycerin (REFRESH OPTIVE) 0.5-0.9 % solution, Apply 1 drop to eye(s) as directed by provider As Needed., Disp: , Rfl:   •  conjugated estrogens (Premarin) 0.625 MG/GM vaginal cream, Insert  into the vagina 2 (Two) Times a Week., Disp: 30 g, Rfl: 12  •  cyclobenzaprine (FLEXERIL) 10 MG tablet, Take 1 tablet by mouth At Night As Needed for  Muscle Spasms., Disp: 30 tablet, Rfl: 2  •  ergocalciferol (ERGOCALCIFEROL) 1.25 MG (10550 UT) capsule, Take 1 capsule by mouth 2 (Two) Times a Week., Disp: 24 capsule, Rfl: 1  •  fluticasone (FLONASE ALLERGY RELIEF) 50 MCG/ACT nasal spray, 2 sprays into the nostril(s) as directed by provider Daily., Disp: , Rfl:   •  Hyoscyamine Sulfate SL (Levsin/SL) 0.125 MG sublingual tablet, Place 1 tablet under the tongue 4 (Four) Times a Day As Needed (gut or bladder spasm)., Disp: 30 each, Rfl: 0  •  Lidocaine Viscous HCl (XYLOCAINE) 2 % solution, Take 10 mL by mouth As Needed for Moderate Pain . Dilute with salt water swish and spit up to 6 times a day, Disp: 100 mL, Rfl: 0  •  mometasone (Nasonex) 50 MCG/ACT nasal spray, 2 sprays into the nostril(s) as directed by provider Daily., Disp: 17 g, Rfl: 12  •  mupirocin (Bactroban) 2 % ointment, Pea size amount into each nostril bid, Disp: 22 g, Rfl: 1  •  naloxone (NARCAN) 4 MG/0.1ML nasal spray, 1 spray into the nostril(s) as directed by provider As Needed (narcotic overdose)., Disp: 1 each, Rfl: 1  •  naproxen (Naprosyn) 500 MG tablet, Take 1 pill q 12 hrs with food as needed for Migraine, Disp: 60 tablet, Rfl: 3  •  ondansetron (Zofran) 8 MG tablet, Take 1 tablet by mouth Every 12 (Twelve) Hours As Needed for Nausea or Vomiting., Disp: 30 tablet, Rfl: 3  •  promethazine (PHENERGAN) 25 MG tablet, 1-2 q6h prn, likely sedating, Disp: 12 tablet, Rfl: 1  •  pseudoephedrine (Sudafed 12 Hour) 120 MG 12 hr tablet, Take 1 tablet by mouth Every 12 (Twelve) Hours., Disp: 60 tablet, Rfl: 2  •  psyllium (Metamucil Smooth Texture) 58.6 % powder, 1 teaspoon daily, increase by 1 teaspoon every 3 days to a max of 2 tablespoons, Disp: 425 g, Rfl: 12  •  SUMAtriptan (Imitrex) 100 MG tablet, Take 1 at onset of Migraine, may rpt in 2 hrs. Max 2 tab in 24h or 3 days/week, Disp: 18 tablet, Rfl: 3  •  traMADol (ULTRAM) 50 MG tablet, Take 2 tablets by mouth Every 6 (Six) Hours As Needed for Moderate  Pain  or Severe Pain ., Disp: 360 tablet, Rfl: 1    Review of Systems   Constitutional: Positive for fatigue. Negative for fever.   HENT: Negative for ear discharge, ear pain and trouble swallowing.    Eyes: Negative for discharge and itching.   Respiratory: Negative for cough and shortness of breath.    Cardiovascular: Negative for chest pain.   Gastrointestinal: Negative for abdominal pain and nausea.   Endocrine: Negative for cold intolerance and heat intolerance.   Musculoskeletal: Positive for back pain and neck pain.   Neurological: Positive for headaches. Negative for dizziness.        I have reviewed ROS completed by medical assistant.     Objective:    Neurologic Exam     Mental Status   Oriented to person, place, and time.   Speech: speech is normal     Cranial Nerves     CN III, IV, VI   Pupils are equal, round, and reactive to light.    Gait, Coordination, and Reflexes     Gait  Gait: normal    Reflexes   Right brachioradialis: 2+  Left brachioradialis: 2+  Right biceps: 2+  Left biceps: 2+  Right triceps: 2+  Left triceps: 2+  Right patellar: 2+  Left patellar: 2+  Right achilles: 2+  Left achilles: 2+      Physical Exam  Vitals and nursing note reviewed.   Constitutional:       Appearance: Normal appearance. She is well-developed.   HENT:      Head: Normocephalic.      Jaw: There is normal jaw occlusion.      Right Ear: Tympanic membrane normal.      Nose: Nose normal.      Mouth/Throat:      Mouth: Mucous membranes are moist.   Eyes:      General: Lids are normal.      Extraocular Movements: Extraocular movements intact.      Conjunctiva/sclera: Conjunctivae normal.      Pupils: Pupils are equal, round, and reactive to light.   Cardiovascular:      Rate and Rhythm: Normal rate and regular rhythm.      Pulses: Normal pulses.      Heart sounds: Normal heart sounds, S1 normal and S2 normal.   Pulmonary:      Effort: Pulmonary effort is normal.   Musculoskeletal:         General: Normal range of motion.       Cervical back: Full passive range of motion without pain and normal range of motion.   Skin:     General: Skin is warm.   Neurological:      General: No focal deficit present.      Mental Status: She is alert and oriented to person, place, and time.      Cranial Nerves: Cranial nerves are intact.      Sensory: Sensation is intact.      Motor: Motor function is intact.      Coordination: Coordination is intact.      Gait: Gait is intact.      Deep Tendon Reflexes: Babinski sign absent on the right side. Babinski sign absent on the left side.      Reflex Scores:       Tricep reflexes are 2+ on the right side and 2+ on the left side.       Bicep reflexes are 2+ on the right side and 2+ on the left side.       Brachioradialis reflexes are 2+ on the right side and 2+ on the left side.       Patellar reflexes are 2+ on the right side and 2+ on the left side.       Achilles reflexes are 2+ on the right side and 2+ on the left side.  Psychiatric:         Attention and Perception: Attention normal.         Mood and Affect: Mood normal.         Speech: Speech normal.         Behavior: Behavior normal. Behavior is cooperative.         Thought Content: Thought content normal.         Cognition and Memory: Cognition normal.         Judgment: Judgment normal.       Discussion: I spent approximately 30 minutes in education of the patient of rebound headache and use of Imitrex.  She will use naproxen for migraine prevention for 2 weeks then use PRN Q 12 hr with food.  Sumatriptan  is for migraine rescue.  She was encouraged to start a headache journal so that we could verify that current medications are helping.  I also renewed Zofran for nausea.  She is to call if she has any questions or concerns.    Assessment/Plan:    Diagnoses and all orders for this visit:    1. Migraine with aura and without status migrainosus, not intractable (Primary)  -     naproxen (Naprosyn) 500 MG tablet; Take 1 pill q 12 hrs with food as needed  for Migraine  Dispense: 60 tablet; Refill: 3  -     SUMAtriptan (Imitrex) 100 MG tablet; Take 1 at onset of Migraine, may rpt in 2 hrs. Max 2 tab in 24h or 3 days/week  Dispense: 18 tablet; Refill: 3    2. Medication overuse headache  Comments:  Discontinue using Percocet for headache.  Stop taking Tylenol, Advil, Excedrin migraine, aspirin on a daily basis.  Do not use Imitrex more than 3 days/week.    3. Nausea  -     ondansetron (Zofran) 8 MG tablet; Take 1 tablet by mouth Every 12 (Twelve) Hours As Needed for Nausea or Vomiting.  Dispense: 30 tablet; Refill: 3        Return in about 3 months (around 12/22/2021) for Next scheduled follow up Yanna Marrufo.    I spent 52 minutes caring for Barbara on this date of service. This time includes time spent by me in the following activities: reviewing tests, obtaining and/or reviewing a separately obtained history, performing a medically appropriate examination and/or evaluation, counseling and educating the patient/family/caregiver, ordering medications, tests, or procedures, referring and communicating with other health care professionals, documenting information in the medical record and independently interpreting results and communicating that information with the patient/family/caregiver.      This document has been electronically signed by Yanna REESE on September 22, 2021 14:57 EDT

## 2021-09-22 ENCOUNTER — OFFICE VISIT (OUTPATIENT)
Dept: NEUROLOGY | Facility: CLINIC | Age: 51
End: 2021-09-22

## 2021-09-22 VITALS
HEIGHT: 68 IN | TEMPERATURE: 97.7 F | WEIGHT: 210 LBS | DIASTOLIC BLOOD PRESSURE: 66 MMHG | BODY MASS INDEX: 31.83 KG/M2 | SYSTOLIC BLOOD PRESSURE: 95 MMHG | HEART RATE: 87 BPM

## 2021-09-22 DIAGNOSIS — F41.9 ANXIETY: ICD-10-CM

## 2021-09-22 DIAGNOSIS — R11.0 NAUSEA: ICD-10-CM

## 2021-09-22 DIAGNOSIS — G43.109 MIGRAINE WITH AURA AND WITHOUT STATUS MIGRAINOSUS, NOT INTRACTABLE: Primary | ICD-10-CM

## 2021-09-22 DIAGNOSIS — G44.40 MEDICATION OVERUSE HEADACHE: ICD-10-CM

## 2021-09-22 PROCEDURE — 99214 OFFICE O/P EST MOD 30 MIN: CPT | Performed by: NURSE PRACTITIONER

## 2021-09-22 RX ORDER — SUMATRIPTAN 100 MG/1
TABLET, FILM COATED ORAL
Qty: 18 TABLET | Refills: 3 | Status: SHIPPED | OUTPATIENT
Start: 2021-09-22 | End: 2022-01-19 | Stop reason: SDUPTHER

## 2021-09-22 RX ORDER — NAPROXEN 500 MG/1
TABLET ORAL
Qty: 60 TABLET | Refills: 3 | Status: SHIPPED | OUTPATIENT
Start: 2021-09-22 | End: 2022-09-30

## 2021-09-22 RX ORDER — ONDANSETRON HYDROCHLORIDE 8 MG/1
8 TABLET, FILM COATED ORAL EVERY 12 HOURS PRN
Qty: 30 TABLET | Refills: 3 | Status: SHIPPED | OUTPATIENT
Start: 2021-09-22

## 2021-09-24 RX ORDER — CLONAZEPAM 0.5 MG/1
0.5 TABLET ORAL EVERY 8 HOURS PRN
Qty: 90 TABLET | Refills: 1 | Status: SHIPPED | OUTPATIENT
Start: 2021-09-24 | End: 2022-01-03 | Stop reason: SDUPTHER

## 2021-10-14 ENCOUNTER — PATIENT ROUNDING (BHMG ONLY) (OUTPATIENT)
Dept: NEUROLOGY | Facility: CLINIC | Age: 51
End: 2021-10-14

## 2021-10-14 NOTE — PROGRESS NOTES
October 14, 2021    Hello, may I speak with Barbara Carter?    My name is Isabel Mann      I am  [place] [facility]  [address] [place] [address] [address]  [place] [address].    Before we get started may I verify your date of birth? 1970      Tell me about your visit with us. What things went well?  Left Voicemail         Thank you, and have a great day.

## 2021-11-02 DIAGNOSIS — M79.7 FIBROMYALGIA: ICD-10-CM

## 2021-11-02 DIAGNOSIS — M35.00 SJOGREN'S SYNDROME WITHOUT EXTRAGLANDULAR INVOLVEMENT (HCC): ICD-10-CM

## 2021-11-02 DIAGNOSIS — G89.4 CHRONIC PAIN SYNDROME: ICD-10-CM

## 2021-11-02 DIAGNOSIS — M50.123 CERVICAL DISC DISORDER AT C6-C7 LEVEL WITH RADICULOPATHY: ICD-10-CM

## 2021-11-02 RX ORDER — CEVIMELINE HYDROCHLORIDE 30 MG/1
30 CAPSULE ORAL EVERY 8 HOURS
Qty: 90 CAPSULE | Refills: 1 | Status: SHIPPED | OUTPATIENT
Start: 2021-11-02

## 2021-11-02 RX ORDER — OXYCODONE HYDROCHLORIDE AND ACETAMINOPHEN 5; 325 MG/1; MG/1
1 TABLET ORAL EVERY 6 HOURS PRN
Qty: 90 TABLET | Refills: 0 | Status: SHIPPED | OUTPATIENT
Start: 2021-11-02 | End: 2021-12-08 | Stop reason: SDUPTHER

## 2021-11-02 NOTE — TELEPHONE ENCOUNTER
Caller: Barbara Carter    Relationship: Self    Medication requested (name and dosage): cevimeline (Evoxac) 30 MG capsule  oxyCODONE-acetaminophen (Percocet) 5-325 MG per tablet    Requested Prescriptions:   Requested Prescriptions     Pending Prescriptions Disp Refills   • cevimeline (Evoxac) 30 MG capsule 90 capsule 1     Sig: Take 1 capsule by mouth Every 8 (Eight) Hours.   • oxyCODONE-acetaminophen (Percocet) 5-325 MG per tablet 90 tablet 0     Sig: Take 1 tablet by mouth Every 6 (Six) Hours As Needed for Severe Pain . 1 tablet q hs and q4h PRN severe pain        [place] where [place] be sent[place] [facility] [place] [address] [id number] [place] [id number]    Additional details provided by patient: PATIENT HAS A WEEK LEFT    Best call back number: [phone]    Does the patient have less than a 3 day supply:  [] Yes  [x] No    Harish Ashley Rep   11/02/21 11:06 EDT

## 2021-11-03 NOTE — TELEPHONE ENCOUNTER
Please inform patient I am renewing medications however will need her to schedule a pain management appointment prior to additional refills. Should really be scheduling every 3 months to continue to prescribe oxycodone.

## 2021-11-30 ENCOUNTER — TELEPHONE (OUTPATIENT)
Dept: FAMILY MEDICINE CLINIC | Facility: CLINIC | Age: 51
End: 2021-11-30

## 2021-11-30 RX ORDER — CLINDAMYCIN HYDROCHLORIDE 300 MG/1
300 CAPSULE ORAL 3 TIMES DAILY
COMMUNITY
End: 2021-11-30 | Stop reason: SDUPTHER

## 2021-11-30 RX ORDER — CLINDAMYCIN HYDROCHLORIDE 300 MG/1
300 CAPSULE ORAL 3 TIMES DAILY
Qty: 30 CAPSULE | Refills: 0
Start: 2021-11-30 | End: 2021-12-13

## 2021-11-30 NOTE — TELEPHONE ENCOUNTER
Please inform patient that clindamycin is not on the list of drugs related to adverse reactions with prolonged QT interval.  Additionally I ran an interaction check with her other medications and did not find any cautions.  It should be safe for her to take clindamycin.

## 2021-11-30 NOTE — TELEPHONE ENCOUNTER
Caller: Barbara Carter    Relationship: Self    Best call back number: [phone]    What is the best time to reach you: ANYTIME    Who are you requesting to speak with (clinical staff, provider,  specific staff member): CLINICAL STAFF    Do you know the name of the person who called: N/A    What was the call regarding: PATIENT HAS AN INFECTED TOOTH AND HER DENTIST PRESCRIBED HER AN ANTIBIOTIC CLINDAMYCIN HCL 150MG IN A DIAL DOWN DOSAGE.     PATIENT IS ON A LOT OF OTHER MEDICATIONS AND IS ANXIOUS ABOUT TAKING THIS ONE. SHE HAS NEVER BEEN ON THIS BEFORE AND JUST WANTS DR ISSA'S INPUT ON WHETHER OR NOT TO TAKE IT.     PLEASE CALL TO ADVISE PATIENT

## 2021-12-03 NOTE — TELEPHONE ENCOUNTER
PATIENT CALLED STATING THAT HER DENTIST IS WANTING TO SWITCH THE ANTIBIOTIC THAT SHE IS TAKING TO:    METRONIDAZOLE 500 MG, TWO ON FIRST DOSE AND THEN ONE EVER 6 HOURS UNTIL INFECTION IS GONE.    CEPHALEXIN 500 MG - TWO ON FIRST DOSE AND THEN ONE EVER 6 HOURS UNTIL INFECTION IS GONE.    PATIENT WOULD LIKE DR. ISSA TO MAKE SURE THAT IT IS SAFE FOR HER TO TAKE THESE MEDICATIONS.    PLEASE ADVISE   [phone]

## 2021-12-05 NOTE — TELEPHONE ENCOUNTER
Please in inform patient that cephalexin/Keflex does not appear to have any drug interactions and should be okay to use with prolonged QT interval.    Flagyl on the other hand indicates it should be used with caution with congenital long QT syndrome, QT prolongation, or QT prolongation family history.  I would contact dentist and let them know that especially at this high-end dosage I would not recommend taking Flagyl at the risk of causing cardiac arrhythmia.    I have not added either to her medication list.  If it would be acceptable to use cephalexin/Keflex alone please add to medication list and confirm she would be stopping clindamycin and remove this from her list.

## 2021-12-08 DIAGNOSIS — M79.7 FIBROMYALGIA: ICD-10-CM

## 2021-12-08 DIAGNOSIS — G89.4 CHRONIC PAIN SYNDROME: ICD-10-CM

## 2021-12-08 DIAGNOSIS — M50.123 CERVICAL DISC DISORDER AT C6-C7 LEVEL WITH RADICULOPATHY: ICD-10-CM

## 2021-12-08 RX ORDER — OXYCODONE HYDROCHLORIDE AND ACETAMINOPHEN 5; 325 MG/1; MG/1
1 TABLET ORAL EVERY 6 HOURS PRN
Qty: 90 TABLET | Refills: 0 | Status: SHIPPED | OUTPATIENT
Start: 2021-12-08 | End: 2022-08-10 | Stop reason: SDUPTHER

## 2021-12-08 NOTE — TELEPHONE ENCOUNTER
Please let patient know I am renewing oxycodone and will see her at her pain management appointment on January 3, 2022.

## 2021-12-08 NOTE — TELEPHONE ENCOUNTER
Caller: Barbara Carter    Relationship: Self    Best call back number: [phone]    Requested Prescriptions     Pending Prescriptions Disp Refills   • oxyCODONE-acetaminophen (Percocet) 5-325 MG per tablet 90 tablet 0     Sig: Take 1 tablet by mouth Every 6 (Six) Hours As Needed for Severe Pain . 1 tablet q hs and q4h PRN severe pain        [place] where [place] should be sent:  [facility] [place] [address] [facility] - [phone] [place] [phone] [phone]    Additional details provided by patient: PATIENT HAS A MEDICATION REFILL VISIT SCHEDULED FOR 01/03/22. PATIENT IS ASKING FOR A REFILL TO GET HER THROUGH TILL APPOINTMENT.     PLEASE CALL TO ADVISE PATIENT IF THIS REFILL CAN BE DONE.     Does the patient have less than a 3 day supply:  [x] Yes  [] No    Harish Leger Rep   12/08/21 09:07 EST

## 2021-12-13 ENCOUNTER — OFFICE VISIT (OUTPATIENT)
Dept: FAMILY MEDICINE CLINIC | Facility: CLINIC | Age: 51
End: 2021-12-13

## 2021-12-13 VITALS
WEIGHT: 219.13 LBS | DIASTOLIC BLOOD PRESSURE: 74 MMHG | HEIGHT: 68 IN | SYSTOLIC BLOOD PRESSURE: 126 MMHG | HEART RATE: 81 BPM | RESPIRATION RATE: 18 BRPM | TEMPERATURE: 98.6 F | OXYGEN SATURATION: 98 % | BODY MASS INDEX: 33.21 KG/M2

## 2021-12-13 DIAGNOSIS — S03.40XA SPRAIN OF TEMPOROMANDIBULAR JOINT, INITIAL ENCOUNTER: ICD-10-CM

## 2021-12-13 DIAGNOSIS — Z23 FLU VACCINE NEED: ICD-10-CM

## 2021-12-13 DIAGNOSIS — H92.02 ACUTE OTALGIA, LEFT: Primary | ICD-10-CM

## 2021-12-13 PROCEDURE — 90686 IIV4 VACC NO PRSV 0.5 ML IM: CPT | Performed by: FAMILY MEDICINE

## 2021-12-13 PROCEDURE — 90471 IMMUNIZATION ADMIN: CPT | Performed by: FAMILY MEDICINE

## 2021-12-13 PROCEDURE — 99213 OFFICE O/P EST LOW 20 MIN: CPT | Performed by: FAMILY MEDICINE

## 2021-12-13 RX ORDER — CYCLOBENZAPRINE HCL 10 MG
10 TABLET ORAL NIGHTLY PRN
Qty: 30 TABLET | Refills: 0 | Status: SHIPPED | OUTPATIENT
Start: 2021-12-13

## 2021-12-13 NOTE — PROGRESS NOTES
Subjective   Barbara Carter is a 51 y.o. female. Presents to [facility]    Chief Complaint   Patient presents with   • Earache       Earache   There is pain in the left ear. This is a new problem. The current episode started 1 to 4 weeks ago. The problem occurs constantly. The problem has been unchanged. There has been no fever. The pain is at a severity of 6/10. The pain is moderate. Pertinent negatives include no abdominal pain, coughing, diarrhea, rash, rhinorrhea or vomiting. She has tried antibiotics for the symptoms. The treatment provided mild relief.       2 weeks ago her left side of her face was swollen. She called her dentist. He put her on clindamycin. She did not take it all. She had a root canal afterwards. Pain improved. Then afterwards her left ear started to hurt. It has hurt since then.     I personally reviewed and updated the patient's allergies, medications, problem list, and past medical, surgical, social, and family history. I have reviewed and confirmed the accuracy of the History of Present Illness and Review of Symptoms as documented by the MA/LPN/RN. Isadora Ramirez MD    Allergies:  Allergies   Allergen Reactions   • Sulfacetamide Rash   • Butalbital-Apap-Caffeine Nausea Only   • Sulfa Antibiotics Unknown - High Severity   • Tricyclic Antidepressants Unknown - High Severity     need to be avoided due to risk of further Q-T prolongation       Social History:  Social History     Socioeconomic History   • Marital status:    Tobacco Use   • Smoking status: Never Smoker   • Smokeless tobacco: Never Used   Vaping Use   • Vaping Use: Never used   Substance and Sexual Activity   • Alcohol use: No   • Drug use: No   • Sexual activity: Yes     Partners: Male     Birth control/protection: None       Family History:  Family History   Problem Relation Age of Onset   • Thyroid disease Mother    • Diabetes Mother         DMII   • Heart failure Mother    • Hypertension Mother     • Stroke Father    • Colon cancer Paternal Grandfather        Past Medical History :  Patient Active Problem List   Diagnosis   • Cervical lymphadenopathy   • Fibromyalgia   • Chronic headache   • Gastroesophageal reflux disease   • Hyperlipidemia   • Hypothyroidism   • Polyarthralgia   • Prolonged QT interval   • Sjogren's syndrome (HCC)   • Tricuspid valve regurgitation   • Vitamin D deficiency   • Lymphadenopathy   • Cervical disc disorder at C6-C7 level with radiculopathy   • Neck pain   • Chronic bilateral low back pain without sciatica   • Chronic pain syndrome   • Anxiety   • Other acute recurrent sinusitis   • Amenorrhea   • Allergic rhinitis   • Abnormal computed tomography scan   • Acute mesenteric adenitis   • Post-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 Dissolve 1 packet in 30 oz of water up to 10x daily. Swish half of solution x 1 minute, spit, then repeat., Disp: , Rfl:   •  aspirin (ASPIRIN ADULT LOW DOSE) 81 MG EC tablet, Take 1 tablet by mouth Daily., Disp: , Rfl:   •  Carboxymethylcellul-Glycerin (REFRESH OPTIVE) 0.5-0.9 % solution, Apply 1 drop to eye(s) as directed by provider As Needed., Disp: , Rfl:   •  cevimeline (Evoxac) 30 MG capsule, Take 1 capsule by mouth Every 8 (Eight) Hours., Disp: 90 capsule, Rfl: 1  •  clonazePAM (KlonoPIN) 0.5 MG tablet, Take 1 tablet by mouth Every 8 (Eight) Hours As Needed for Anxiety., Disp: 90 tablet, Rfl: 1  •  conjugated estrogens (Premarin) 0.625 MG/GM vaginal cream, Insert  into the vagina 2 (Two) Times a Week., Disp: 30 g, Rfl: 12  •  cyclobenzaprine (FLEXERIL) 10 MG tablet, Take 1 tablet by mouth At Night As Needed for Muscle Spasms., Disp: 30 tablet, Rfl: 0  •  DULoxetine (Cymbalta) 60 MG capsule, Take 1 capsule by mouth Daily., Disp: 30  capsule, Rfl: 5  •  ergocalciferol (ERGOCALCIFEROL) 1.25 MG (53817 UT) capsule, Take 1 capsule by mouth 2 (Two) Times a Week., Disp: 24 capsule, Rfl: 1  •  fluticasone (FLONASE ALLERGY RELIEF) 50 MCG/ACT nasal spray, 2 sprays into the nostril(s) as directed by provider Daily., Disp: , Rfl:   •  gabapentin (NEURONTIN) 300 MG capsule, Take 2 capsules by mouth 3 (Three) Times a Day. For burning, Disp: 180 capsule, Rfl: 5  •  hydroxychloroquine (PLAQUENIL) 200 MG tablet, Take 2 tablets by mouth Daily., Disp: , Rfl:   •  Hyoscyamine Sulfate SL (Levsin/SL) 0.125 MG sublingual tablet, Place 1 tablet under the tongue 4 (Four) Times a Day As Needed (gut or bladder spasm)., Disp: 30 each, Rfl: 0  •  levothyroxine (Synthroid) 150 MCG tablet, Take 1 tablet by mouth Daily., Disp: 30 tablet, Rfl: 5  •  Lidocaine Viscous HCl (XYLOCAINE) 2 % solution, Take 10 mL by mouth As Needed for Moderate Pain . Dilute with salt water swish and spit up to 6 times a day, Disp: 100 mL, Rfl: 0  •  mometasone (Nasonex) 50 MCG/ACT nasal spray, 2 sprays into the nostril(s) as directed by provider Daily., Disp: 17 g, Rfl: 12  •  Multiple Vitamins-Minerals (CENTRUM ADULTS) tablet, Take 1 tablet by mouth Daily., Disp: , Rfl:   •  mupirocin (Bactroban) 2 % ointment, Pea size amount into each nostril bid, Disp: 22 g, Rfl: 1  •  naloxone (NARCAN) 4 MG/0.1ML nasal spray, 1 spray into the nostril(s) as directed by provider As Needed (narcotic overdose)., Disp: 1 each, Rfl: 1  •  naproxen (Naprosyn) 500 MG tablet, Take 1 pill q 12 hrs with food as needed for Migraine, Disp: 60 tablet, Rfl: 3  •  omeprazole (priLOSEC) 40 MG capsule, Take 1 capsule by mouth Daily., Disp: 30 capsule, Rfl: 5  •  ondansetron (Zofran) 8 MG tablet, Take 1 tablet by mouth Every 12 (Twelve) Hours As Needed for Nausea or Vomiting., Disp: 30 tablet, Rfl: 3  •  oxyCODONE-acetaminophen (Percocet) 5-325 MG per tablet, Take 1 tablet by mouth Every 6 (Six) Hours As Needed for Severe Pain .  "1 tablet q hs and q4h PRN severe pain, Disp: 90 tablet, Rfl: 0  •  promethazine (PHENERGAN) 25 MG tablet, 1-2 q6h prn, likely sedating, Disp: 12 tablet, Rfl: 1  •  pseudoephedrine (Sudafed 12 Hour) 120 MG 12 hr tablet, Take 1 tablet by mouth Every 12 (Twelve) Hours., Disp: 60 tablet, Rfl: 2  •  psyllium (Metamucil Smooth Texture) 58.6 % powder, 1 teaspoon daily, increase by 1 teaspoon every 3 days to a max of 2 tablespoons, Disp: 425 g, Rfl: 12  •  SUMAtriptan (Imitrex) 100 MG tablet, Take 1 at onset of Migraine, may rpt in 2 hrs. Max 2 tab in 24h or 3 days/week, Disp: 18 tablet, Rfl: 3  •  traMADol (ULTRAM) 50 MG tablet, Take 2 tablets by mouth Every 6 (Six) Hours As Needed for Moderate Pain  or Severe Pain ., Disp: 360 tablet, Rfl: 1    Past Surgical History:  Past Surgical History:   Procedure Laterality Date   • APPENDECTOMY     •  SECTION     • CHOLECYSTECTOMY         Review of Systems:  Review of Systems   Constitutional: Negative for activity change and fever.   HENT: Positive for ear pain. Negative for rhinorrhea, sinus pressure and voice change.    Eyes: Negative for visual disturbance.   Respiratory: Negative for cough and shortness of breath.    Cardiovascular: Negative for chest pain.   Gastrointestinal: Negative for abdominal pain, diarrhea, nausea and vomiting.   Endocrine: Negative for cold intolerance and heat intolerance.   Genitourinary: Negative for frequency and urgency.   Musculoskeletal: Negative for arthralgias.   Skin: Negative for rash.   Neurological: Negative for syncope.   Hematological: Does not bruise/bleed easily.   Psychiatric/Behavioral: Negative for depressed mood. The patient is not nervous/anxious.        Physical Exam:  Vital Signs:  Vital Signs:   /74 (BP Location: Right arm, Patient Position: Sitting, Cuff Size: Adult)   Pulse 81   Temp 98.6 °F (37 °C) (Temporal)   Resp 18   Ht 172.7 cm (68\")   Wt 99.4 kg (219 lb 2 oz)   SpO2 98% Comment: Room Air  BMI " 33.32 kg/m²     Result Review :                Physical Exam  Vitals reviewed.   Constitutional:       Appearance: Normal appearance. She is well-developed.   HENT:      Head: Normocephalic and atraumatic.      Right Ear: External ear normal. No decreased hearing noted. No tenderness. No middle ear effusion. Tympanic membrane is not injected, erythematous, retracted or bulging.      Left Ear: External ear normal. No decreased hearing noted. No tenderness.  No middle ear effusion. Tympanic membrane is not injected, erythematous, retracted or bulging.      Nose: Nose normal. No rhinorrhea.      Mouth/Throat:      Pharynx: No oropharyngeal exudate or posterior oropharyngeal erythema.   Eyes:      General:         Right eye: No discharge.         Left eye: No discharge.   Neck:      Comments: Tender in sternocleidomastoid  Cardiovascular:      Rate and Rhythm: Normal rate and regular rhythm.      Heart sounds: Normal heart sounds. No murmur heard.  No friction rub. No gallop.    Pulmonary:      Effort: Pulmonary effort is normal. No respiratory distress.      Breath sounds: Normal breath sounds. No wheezing or rales.   Lymphadenopathy:      Cervical: No cervical adenopathy.   Skin:     General: Skin is warm and dry.      Findings: No rash.   Neurological:      Mental Status: She is alert and oriented to person, place, and time.      Coordination: Coordination normal.      Gait: Gait normal.   Psychiatric:         Behavior: Behavior is cooperative.         Assessment and Plan:  Problems Addressed this Visit        ENT    Acute otalgia, left - Primary     Referred from TMJ.          TMJ (sprain of temporomandibular joint)     Discussed nsaids and ice the area  Stay away from foods she has to chew a lot.          Relevant Medications    cyclobenzaprine (FLEXERIL) 10 MG tablet      Diagnoses       Codes Comments    Acute otalgia, left    -  Primary ICD-10-CM: H92.02  ICD-9-CM: 388.70     Sprain of temporomandibular joint,  initial encounter     ICD-10-CM: S03.40XA  ICD-9-CM: 848.1            An After Visit Summary and PPPS were given to the patient.       I wore protective equipment throughout this patient encounter to include mask. Hand hygiene was performed before donning protective equipment and after removal when leaving the room.

## 2022-01-03 ENCOUNTER — OFFICE VISIT (OUTPATIENT)
Dept: FAMILY MEDICINE CLINIC | Facility: CLINIC | Age: 52
End: 2022-01-03

## 2022-01-03 VITALS
HEART RATE: 87 BPM | DIASTOLIC BLOOD PRESSURE: 82 MMHG | TEMPERATURE: 97.6 F | HEIGHT: 68 IN | OXYGEN SATURATION: 98 % | RESPIRATION RATE: 18 BRPM | SYSTOLIC BLOOD PRESSURE: 124 MMHG | WEIGHT: 217.6 LBS | BODY MASS INDEX: 32.98 KG/M2

## 2022-01-03 DIAGNOSIS — E03.9 ACQUIRED HYPOTHYROIDISM: ICD-10-CM

## 2022-01-03 DIAGNOSIS — R51.9 CHRONIC INTRACTABLE HEADACHE, UNSPECIFIED HEADACHE TYPE: ICD-10-CM

## 2022-01-03 DIAGNOSIS — G89.29 CHRONIC INTRACTABLE HEADACHE, UNSPECIFIED HEADACHE TYPE: ICD-10-CM

## 2022-01-03 DIAGNOSIS — F41.9 ANXIETY: ICD-10-CM

## 2022-01-03 DIAGNOSIS — E55.9 VITAMIN D DEFICIENCY: ICD-10-CM

## 2022-01-03 DIAGNOSIS — G89.29 CHRONIC BILATERAL LOW BACK PAIN WITHOUT SCIATICA: Primary | ICD-10-CM

## 2022-01-03 DIAGNOSIS — M54.50 CHRONIC BILATERAL LOW BACK PAIN WITHOUT SCIATICA: Primary | ICD-10-CM

## 2022-01-03 DIAGNOSIS — E78.2 MIXED HYPERLIPIDEMIA: ICD-10-CM

## 2022-01-03 DIAGNOSIS — M79.7 FIBROMYALGIA: ICD-10-CM

## 2022-01-03 PROCEDURE — 99214 OFFICE O/P EST MOD 30 MIN: CPT | Performed by: FAMILY MEDICINE

## 2022-01-03 RX ORDER — CLONAZEPAM 0.5 MG/1
0.5 TABLET ORAL EVERY 8 HOURS PRN
Qty: 90 TABLET | Refills: 1 | Status: SHIPPED | OUTPATIENT
Start: 2022-01-03 | End: 2022-03-11 | Stop reason: SDUPTHER

## 2022-01-03 RX ORDER — DULOXETIN HYDROCHLORIDE 60 MG/1
60 CAPSULE, DELAYED RELEASE ORAL DAILY
Qty: 90 CAPSULE | Refills: 5 | Status: SHIPPED | OUTPATIENT
Start: 2022-01-03 | End: 2023-01-24 | Stop reason: SDUPTHER

## 2022-01-03 RX ORDER — ERGOCALCIFEROL 1.25 MG/1
1 CAPSULE ORAL 2 TIMES WEEKLY
Qty: 24 CAPSULE | Refills: 1 | Status: SHIPPED | OUTPATIENT
Start: 2022-01-03 | End: 2022-08-10 | Stop reason: SDUPTHER

## 2022-01-03 NOTE — PATIENT INSTRUCTIONS
Myofascial Pain Syndrome and Fibromyalgia  Myofascial pain syndrome and fibromyalgia are both pain disorders. This pain may be felt mainly in your muscles.  · Myofascial pain syndrome:  ? Always has tender points in the muscle that will cause pain when pressed (trigger points). The pain may come and go.  ? Usually affects your neck, upper back, and shoulder areas. The pain often radiates into your arms and hands.  · Fibromyalgia:  ? Has muscle pains and tenderness that come and go.  ? Is often associated with fatigue and sleep problems.  ? Has trigger points.  ? Tends to be long-lasting (chronic), but is not life-threatening.  Fibromyalgia and myofascial pain syndrome are not the same. However, they often occur together. If you have both conditions, each can make the other worse. Both are common and can cause enough pain and fatigue to make day-to-day activities difficult. Both can be hard to diagnose because their symptoms are common in many other conditions.  What are the causes?  The exact causes of these conditions are not known.  What increases the risk?  You are more likely to develop this condition if:  · You have a family history of the condition.  · You have certain triggers, such as:  ? Spine disorders.  ? An injury (trauma) or other physical stressors.  ? Being under a lot of stress.  ? Medical conditions such as osteoarthritis, rheumatoid arthritis, or lupus.  What are the signs or symptoms?  Fibromyalgia  The main symptom of fibromyalgia is widespread pain and tenderness in your muscles. Pain is sometimes described as stabbing, shooting, or burning.  You may also have:  · Tingling or numbness.  · Sleep problems and fatigue.  · Problems with attention and concentration (fibro fog).  Other symptoms may include:   · Bowel and bladder problems.  · Headaches.  · Visual problems.  · Problems with odors and noises.  · Depression or mood changes.  · Painful menstrual periods (dysmenorrhea).  · Dry skin or  eyes.  These symptoms can vary over time.  Myofascial pain syndrome  Symptoms of myofascial pain syndrome include:  · Tight, ropy bands of muscle.  · Uncomfortable sensations in muscle areas. These may include aching, cramping, burning, numbness, tingling, and weakness.  · Difficulty moving certain parts of the body freely (poor range of motion).  How is this diagnosed?  This condition may be diagnosed by your symptoms and medical history. You will also have a physical exam. In general:  · Fibromyalgia is diagnosed if you have pain, fatigue, and other symptoms for more than 3 months, and symptoms cannot be explained by another condition.  · Myofascial pain syndrome is diagnosed if you have trigger points in your muscles, and those trigger points are tender and cause pain elsewhere in your body (referred pain).  How is this treated?  Treatment for these conditions depends on the type that you have.  · For fibromyalgia:  ? Pain medicines, such as NSAIDs.  ? Medicines for treating depression.  ? Medicines for treating seizures.  ? Medicines that relax the muscles.  · For myofascial pain:  ? Pain medicines, such as NSAIDs.  ? Cooling and stretching of muscles.  ? Trigger point injections.  ? Sound wave (ultrasound) treatments to stimulate muscles.  Treating these conditions often requires a team of health care providers. These may include:  · Your primary care provider.  · Physical therapist.  · Complementary health care providers, such as massage therapists or acupuncturists.  · Psychiatrist for cognitive behavioral therapy.  Follow these instructions at home:  Medicines  · Take over-the-counter and prescription medicines only as told by your health care provider.  · Do not drive or use heavy machinery while taking prescription pain medicine.  · If you are taking prescription pain medicine, take actions to prevent or treat constipation. Your health care provider may recommend that you:  ? Drink enough fluid to keep  your urine pale yellow.  ? Eat foods that are high in fiber, such as fresh fruits and vegetables, whole grains, and beans.  ? Limit foods that are high in fat and processed sugars, such as fried or sweet foods.  ? Take an over-the-counter or prescription medicine for constipation.  Lifestyle    · Exercise as directed by your health care provider or physical therapist.  · Practice relaxation techniques to control your stress. You may want to try:  ? Biofeedback.  ? Visual imagery.  ? Hypnosis.  ? Muscle relaxation.  ? Yoga.  ? Meditation.  · Maintain a healthy lifestyle. This includes eating a healthy diet and getting enough sleep.  · Do not use any products that contain nicotine or tobacco, such as cigarettes and e-cigarettes. If you need help quitting, ask your health care provider.    General instructions  · Talk to your health care provider about complementary treatments, such as acupuncture or massage.  · Consider joining a support group with others who are diagnosed with this condition.  · Do not do activities that stress or strain your muscles. This includes repetitive motions and heavy lifting.  · Keep all follow-up visits as told by your health care provider. This is important.  Where to find more information  · National Fibromyalgia Association: www.fmaware.org  · Arthritis Foundation: www.arthritis.org  · American Chronic Pain Association: www.theacpa.org  Contact a health care provider if:  · You have new symptoms.  · Your symptoms get worse or your pain is severe.  · You have side effects from your medicines.  · You have trouble sleeping.  · Your condition is causing depression or anxiety.  Summary  · Myofascial pain syndrome and fibromyalgia are pain disorders.  · Myofascial pain syndrome has tender points in the muscle that will cause pain when pressed (trigger points). Fibromyalgia also has muscle pains and tenderness that come and go, but this condition is often associated with fatigue and sleep  "disturbances.  · Fibromyalgia and myofascial pain syndrome are not the same but often occur together, causing pain and fatigue that make day-to-day activities difficult.  · Treatment for fibromyalgia includes taking medicines to relax the muscles and medicines for pain, depression, or seizures. Treatment for myofascial pain syndrome includes taking medicines for pain, cooling and stretching of muscles, and injecting medicines into trigger points.  · Follow your health care provider's instructions for taking medicines and maintaining a healthy lifestyle.  This information is not intended to replace advice given to you by your health care provider. Make sure you discuss any questions you have with your health care provider.  Document Revised: 04/10/2020 Document Reviewed: 01/02/2019  [employer] Patient Education © 2021 [employer]    [email or web address]\">   Chronic Back Pain  When back pain lasts longer than 3 months, it is called chronic back pain. The cause of your back pain may not be known. Some common causes include:  · Wear and tear (degenerative disease) of the bones, ligaments, or disks in your back.  · Inflammation and stiffness in your back (arthritis).  People who have chronic back pain often go through certain periods in which the pain is more intense (flare-ups). Many people can learn to manage the pain with home care.  Follow these instructions at home:  Pay attention to any changes in your symptoms. Take these actions to help with your pain:  Managing pain and stiffness         · If directed, apply ice to the painful area. Your health care provider may recommend applying ice during the first 24-48 hours after a flare-up begins. To do this:  ? Put ice in a plastic bag.  ? Place a towel between your skin and the bag.  ? Leave the ice on for 20 minutes, 2-3 times per day.  · If directed, apply heat to the affected area as often as told by your health care provider. Use the heat source " that your health care provider recommends, such as a moist heat pack or a heating pad.  ? Place a towel between your skin and the heat source.  ? Leave the heat on for 20-30 minutes.  ? Remove the heat if your skin turns bright red. This is especially important if you are unable to feel pain, heat, or cold. You may have a greater risk of getting burned.  · Try soaking in a warm tub.  Activity    · Avoid bending and other activities that make the problem worse.  · Maintain a proper position when standing or sitting:  ? When standing, keep your upper back and neck straight, with your shoulders pulled back. Avoid slouching.  ? When sitting, keep your back straight and relax your shoulders. Do not round your shoulders or pull them backward.  · Do not sit or  one place for long periods of time.  · Take brief periods of rest throughout the day. This will reduce your pain. Resting in a lying or standing position is usually better than sitting to rest.  · When you are resting for longer periods, mix in some mild activity or stretching between periods of rest. This will help to prevent stiffness and pain.  · Get regular exercise. Ask your health care provider what activities are safe for you.  · Do not lift anything that is heavier than 10 lb (4.5 kg), or the limit that you are told, until your health care provider says that it is safe. Always use proper lifting technique, which includes:  ? Bending your knees.  ? Keeping the load close to your body.  ? Avoiding twisting.  · Sleep on a firm mattress in a comfortable position. Try lying on your side with your knees slightly bent. If you lie on your back, put a pillow under your knees.    Medicines  · Treatment may include medicines for pain and inflammation taken by mouth or applied to the skin, prescription pain medicine, or muscle relaxants. Take over-the-counter and prescription medicines only as told by your health care provider.  · Ask your health care provider  if the medicine prescribed to you:  ? Requires you to avoid driving or using machinery.  ? Can cause constipation. You may need to take these actions to prevent or treat constipation:  § Drink enough fluid to keep your urine pale yellow.  § Take over-the-counter or prescription medicines.  § Eat foods that are high in fiber, such as beans, whole grains, and fresh fruits and vegetables.  § Limit foods that are high in fat and processed sugars, such as fried or sweet foods.  General instructions  · Do not use any products that contain nicotine or tobacco, such as cigarettes, e-cigarettes, and chewing tobacco. If you need help quitting, ask your health care provider.  · Keep all follow-up visits as told by your health care provider. This is important.  Contact a health care provider if:  · You have pain that is not relieved with rest or medicine.  · Your pain gets worse, or you have new pain.  · You have a high fever.  · You have rapid weight loss.  · You have trouble doing your normal activities.  Get help right away if:  · You have weakness or numbness in one or both of your legs or feet.  · You have trouble controlling your bladder or your bowels.  · You have severe back pain and have any of the following:  ? Nausea or vomiting.  ? Pain in your abdomen.  ? Shortness of breath or you faint.  Summary  · Chronic back pain is back pain that lasts longer than 3 months.  · When a flare-up begins, apply ice to the painful area for the first 24-48 hours.  · Apply a moist heat pad or use a heating pad on the painful area as directed by your health care provider.  · When you are resting for longer periods, mix in some mild activity or stretching between periods of rest. This will help to prevent stiffness and pain.  This information is not intended to replace advice given to you by your health care provider. Make sure you discuss any questions you have with your health care provider.  Document Revised: 01/27/2021 Document  Reviewed: 01/27/2021  [employer] Patient Education © 2021 [employer]    Mindfulness-Based Stress Reduction  Mindfulness-based stress reduction (MBSR) is a program that helps people learn to practice mindfulness. Mindfulness is the practice of intentionally paying attention to the present moment. It can be learned and practiced through techniques such as education, breathing exercises, meditation, and yoga. MBSR includes several mindfulness techniques in one program.  MBSR works best when you understand the treatment, are willing to try new things, and can commit to spending time practicing what you learn. MBSR training may include learning about:  · How your emotions, thoughts, and reactions affect your body.  · New ways to respond to things that cause negative thoughts to start (triggers).  · How to notice your thoughts and let go of them.  · Practicing awareness of everyday things that you normally do without thinking.  · The techniques and goals of different types of meditation.  What are the benefits of MBSR?  MBSR can have many benefits, which include helping you to:  · Develop self-awareness. This refers to knowing and understanding yourself.  · Learn skills and attitudes that help you to participate in your own health care.  · Learn new ways to care for yourself.  · Be more accepting about how things are, and let things go.  · Be less judgmental and approach things with an open mind.  · Be patient with yourself and trust yourself more.  MBSR has also been shown to:  · Reduce negative emotions, such as depression and anxiety.  · Improve memory and focus.  · Change how you sense and approach pain.  · Boost your body's ability to fight infections.  · Help you connect better with other people.  · Improve your sense of well-being.  Follow these instructions at home:    · Find a local in-person or online MBSR program.  · Set aside some time regularly for mindfulness practice.  · Find a mindfulness practice that  works best for you. This may include one or more of the following:  ? Meditation. Meditation involves focusing your mind on a certain thought or activity.  ? Breathing awareness exercises. These help you to stay present by focusing on your breath.  ? Body scan. For this practice, you lie down and pay attention to each part of your body from head to toe. You can identify tension and soreness and intentionally relax parts of your body.  ? Yoga. Yoga involves stretching and breathing, and it can improve your ability to move and be flexible. It can also provide an experience of testing your body's limits, which can help you release stress.  ? Mindful eating. This way of eating involves focusing on the taste, texture, color, and smell of each bite of food. Because this slows down eating and helps you feel full sooner, it can be an important part of a weight-loss plan.  · Find a podcast or recording that provides guidance for breathing awareness, body scan, or meditation exercises. You can listen to these any time when you have a free moment to rest without distractions.  · Follow your treatment plan as told by your health care provider. This may include taking regular medicines and making changes to your diet or lifestyle as recommended.  How to practice mindfulness  To do a basic awareness exercise:  · Find a comfortable place to sit.  · Pay attention to the present moment. Observe your thoughts, feelings, and surroundings just as they are.  · Avoid placing judgment on yourself, your feelings, or your surroundings. Make note of any judgment that comes up, and let it go.  · Your mind may wander, and that is okay. Make note of when your thoughts drift, and return your attention to the present moment.  To do basic mindfulness meditation:  · Find a comfortable place to sit. This may include a stable chair or a firm floor cushion.  ? Sit upright with your back straight. Let your arms fall next to your side with your hands  resting on your legs.  ? If sitting in a chair, rest your feet flat on the floor.  ? If sitting on a cushion, cross your legs in front of you.  · Keep your head in a neutral position with your chin dropped slightly. Relax your jaw and rest the tip of your tongue on the roof of your mouth. Drop your gaze to the floor. You can close your eyes if you like.  · Breathe normally and pay attention to your breath. Feel the air moving in and out of your nose. Feel your belly expanding and relaxing with each breath.  · Your mind may wander, and that is okay. Make note of when your thoughts drift, and return your attention to your breath.  · Avoid placing judgment on yourself, your feelings, or your surroundings. Make note of any judgment or feelings that come up, let them go, and bring your attention back to your breath.  · When you are ready, lift your gaze or open your eyes. Pay attention to how your body feels after the meditation.  Where to find more information  You can find more information about MBSR from:  · Your health care provider.  · Community-based meditation centers or programs.  · Programs offered near you.  Summary  · Mindfulness-based stress reduction (MBSR) is a program that teaches you how to intentionally pay attention to the present moment. It is used with other treatments to help you cope better with daily stress, emotions, and pain.  · MBSR focuses on developing self-awareness, which allows you to respond to life stress without judgment or negative emotions.  · MBSR programs may involve learning different mindfulness practices, such as breathing exercises, meditation, yoga, body scan, or mindful eating. Find a mindfulness practice that works best for you, and set aside time for it on a regular basis.  This information is not intended to replace advice given to you by your health care provider. Make sure you discuss any questions you have with your health care provider.  Document Revised: 02/22/2021  Document Reviewed: 04/26/2018  [employer] Patient Education © 2021 [employer]

## 2022-01-03 NOTE — PROGRESS NOTES
Chief Complaint  Pain and Anxiety     History of Present Illness  Barbara Carter presents today for a pain management appt for chronic pain from fibromyalgia chronic low back pain.  She typically takes 1 Percocet at night and 2 tramadol tablets mornings.  She reports early December she had an abscessed tooth and dentist would not prescribe additional pain patient says she was using her Percocet up to 4 times a day for about 1 week until had root canal.  Last refill from assessment letter have her usual 90-day supply of 90 tablets that filled number 28 tablets on December 14.  She reports she will rarely use an additional Percocet on more painful days.    Anxiety, patient reports her anxiety has been worse recently she is continue Cymbalta and is taking often at bedtime and for the last 2 months is noticed an increase in need for daytime use as well.  She does have a dose of Narcan at home and is aware of the reaction between visit as a means and narcotics for increased risk of overdose.  She denies any excessive sedation.      Subjective            Current Outpatient Medications on File Prior to Visit   Medication Sig   • Artificial Saliva (SALIVAMAX) pack Take 1 packet by mouth Daily. Dissolve 1 packet in 30 oz of water up to 10x daily. Swish half of solution x 1 minute, spit, then repeat.   • aspirin (ASPIRIN ADULT LOW DOSE) 81 MG EC tablet Take 1 tablet by mouth Daily.   • Carboxymethylcellul-Glycerin (REFRESH OPTIVE) 0.5-0.9 % solution Apply 1 drop to eye(s) as directed by provider As Needed.   • cevimeline (Evoxac) 30 MG capsule Take 1 capsule by mouth Every 8 (Eight) Hours.   • conjugated estrogens (Premarin) 0.625 MG/GM vaginal cream Insert  into the vagina 2 (Two) Times a Week.   • cyclobenzaprine (FLEXERIL) 10 MG tablet Take 1 tablet by mouth At Night As Needed for Muscle Spasms.   • fluticasone (FLONASE ALLERGY RELIEF) 50 MCG/ACT nasal spray 2 sprays into the nostril(s) as directed by provider Daily.   •  gabapentin (NEURONTIN) 300 MG capsule Take 2 capsules by mouth 3 (Three) Times a Day. For burning   • hydroxychloroquine (PLAQUENIL) 200 MG tablet Take 2 tablets by mouth Daily.   • Hyoscyamine Sulfate SL (Levsin/SL) 0.125 MG sublingual tablet Place 1 tablet under the tongue 4 (Four) Times a Day As Needed (gut or bladder spasm).   • levothyroxine (Synthroid) 150 MCG tablet Take 1 tablet by mouth Daily.   • Lidocaine Viscous HCl (XYLOCAINE) 2 % solution Take 10 mL by mouth As Needed for Moderate Pain . Dilute with salt water swish and spit up to 6 times a day   • mometasone (Nasonex) 50 MCG/ACT nasal spray 2 sprays into the nostril(s) as directed by provider Daily.   • Multiple Vitamins-Minerals (CENTRUM ADULTS) tablet Take 1 tablet by mouth Daily.   • mupirocin (Bactroban) 2 % ointment Pea size amount into each nostril bid   • naloxone (NARCAN) 4 MG/0.1ML nasal spray 1 spray into the nostril(s) as directed by provider As Needed (narcotic overdose).   • naproxen (Naprosyn) 500 MG tablet Take 1 pill q 12 hrs with food as needed for Migraine   • omeprazole (priLOSEC) 40 MG capsule Take 1 capsule by mouth Daily.   • ondansetron (Zofran) 8 MG tablet Take 1 tablet by mouth Every 12 (Twelve) Hours As Needed for Nausea or Vomiting.   • oxyCODONE-acetaminophen (Percocet) 5-325 MG per tablet Take 1 tablet by mouth Every 6 (Six) Hours As Needed for Severe Pain . 1 tablet q hs and q4h PRN severe pain   • promethazine (PHENERGAN) 25 MG tablet 1-2 q6h prn, likely sedating   • pseudoephedrine (Sudafed 12 Hour) 120 MG 12 hr tablet Take 1 tablet by mouth Every 12 (Twelve) Hours.   • psyllium (Metamucil Smooth Texture) 58.6 % powder 1 teaspoon daily, increase by 1 teaspoon every 3 days to a max of 2 tablespoons   • SUMAtriptan (Imitrex) 100 MG tablet Take 1 at onset of Migraine, may rpt in 2 hrs. Max 2 tab in 24h or 3 days/week   • traMADol (ULTRAM) 50 MG tablet Take 2 tablets by mouth Every 6 (Six) Hours As Needed for Moderate Pain   "or Severe Pain .   • [DISCONTINUED] clonazePAM (KlonoPIN) 0.5 MG tablet Take 1 tablet by mouth Every 8 (Eight) Hours As Needed for Anxiety.   • [DISCONTINUED] DULoxetine (Cymbalta) 60 MG capsule Take 1 capsule by mouth Daily.   • [DISCONTINUED] ergocalciferol (ERGOCALCIFEROL) 1.25 MG (49742 UT) capsule Take 1 capsule by mouth 2 (Two) Times a Week.     No current facility-administered medications on file prior to visit.       Objective   Vital Signs:   Vitals:    01/03/22 0828   BP: 124/82   Pulse: 87   Resp: 18   Temp: 97.6 °F (36.4 °C)   SpO2: 98%   Weight: 98.7 kg (217 lb 9.6 oz)   Height: 172.7 cm (68\")   PainSc: 0-No pain       Physical Exam  Vitals and nursing note reviewed.   Constitutional:       General: She is not in acute distress.     Appearance: Normal appearance. She is well-developed.   HENT:      Head: Normocephalic and atraumatic.   Eyes:      Conjunctiva/sclera: Conjunctivae normal.      Pupils: Pupils are equal, round, and reactive to light.   Neck:      Thyroid: No thyromegaly.      Vascular: No JVD.   Cardiovascular:      Rate and Rhythm: Normal rate and regular rhythm.      Heart sounds: Normal heart sounds. No murmur heard.      Pulmonary:      Effort: Pulmonary effort is normal.      Breath sounds: Normal breath sounds. No wheezing or rales.   Musculoskeletal:         General: Normal range of motion.      Cervical back: Normal range of motion.      Comments: Tender top palpation in low back midline greater than Paraspinous muscles and SI /sciatica area. Neg straight leg raises.    Lymphadenopathy:      Cervical: No cervical adenopathy.   Skin:     General: Skin is warm and dry.      Findings: No rash.   Neurological:      Mental Status: She is alert and oriented to person, place, and time.   Psychiatric:         Mood and Affect: Mood normal.         Behavior: Behavior normal.                No results found for: HGBA1C               Assessment and Plan    Diagnoses and all orders for this " visit:    1. Chronic bilateral low back pain without sciatica (Primary)  -     Comprehensive Metabolic Panel  -     CBC & Differential    2. Fibromyalgia  -     Comprehensive Metabolic Panel  -     CBC & Differential    3. Anxiety  -     Comprehensive Metabolic Panel  -     CBC & Differential  -     clonazePAM (KlonoPIN) 0.5 MG tablet; Take 1 tablet by mouth Every 8 (Eight) Hours As Needed for Anxiety.  Dispense: 90 tablet; Refill: 1  -     DULoxetine (Cymbalta) 60 MG capsule; Take 1 capsule by mouth Daily.  Dispense: 90 capsule; Refill: 5    4. Chronic intractable headache, unspecified headache type  -     Comprehensive Metabolic Panel  -     CBC & Differential    5. Acquired hypothyroidism  -     Comprehensive Metabolic Panel  -     TSH  -     T4, Free  -     T3, Free  -     CBC & Differential    6. Mixed hyperlipidemia  -     Lipid Panel  -     Comprehensive Metabolic Panel  -     CBC & Differential    7. Vitamin D deficiency  -     ergocalciferol (ERGOCALCIFEROL) 1.25 MG (74045 UT) capsule; Take 1 capsule by mouth 2 (Two) Times a Week.  Dispense: 24 capsule; Refill: 1  -     Vitamin D 25 Hydroxy    Chronic pain from fibromyalgia and chronic low back pain is stable, she will continue Percocet for evening use and tramadol for daytime use due to some sedation with Percocet but reports Percocet continues to control pain better.  Did encourage her to become more physically active, stretching and exercising for back pain and fibromyalgia.    Anxiety with slight increase in symptoms recently.  Discussed mindfulness based stress reduction and also the benefit of exercise to help control stress and anxiety.    Patient is due for annual exam, we will order labs to do today, adjusting levothyroxine if needed and have her return in a couple weeks to review labs and complete physical.    Patient has had 1st 2 [place] Covid vaccinations and is due for her booster on January 28, 2022.  She states she will get that  scheduled.        Medications Discontinued During This Encounter   Medication Reason   • DULoxetine (Cymbalta) 60 MG capsule Reorder   • ergocalciferol (ERGOCALCIFEROL) 1.25 MG (14561 UT) capsule Reorder   • clonazePAM (KlonoPIN) 0.5 MG tablet Reorder         Follow Up     Return in about 2 weeks (around 1/17/2022) for Annual physical.    Patient was given instructions and counseling regarding her condition or for health maintenance advice. Please see specific information pulled into the AVS if appropriate.

## 2022-01-04 LAB
25(OH)D3+25(OH)D2 SERPL-MCNC: 53.9 NG/ML (ref 30–100)
ALBUMIN SERPL-MCNC: 4.7 G/DL (ref 3.8–4.9)
ALBUMIN/GLOB SERPL: 1.6 {RATIO} (ref 1.2–2.2)
ALP SERPL-CCNC: 86 IU/L (ref 44–121)
ALT SERPL-CCNC: 17 IU/L (ref 0–32)
AST SERPL-CCNC: 19 IU/L (ref 0–40)
BASOPHILS # BLD AUTO: 0.1 X10E3/UL (ref 0–0.2)
BASOPHILS NFR BLD AUTO: 1 %
BILIRUB SERPL-MCNC: 0.3 MG/DL (ref 0–1.2)
BUN SERPL-MCNC: 9 MG/DL (ref 6–24)
BUN/CREAT SERPL: 10 (ref 9–23)
CALCIUM SERPL-MCNC: 10 MG/DL (ref 8.7–10.2)
CHLORIDE SERPL-SCNC: 103 MMOL/L (ref 96–106)
CHOLEST SERPL-MCNC: 205 MG/DL (ref 100–199)
CO2 SERPL-SCNC: 24 MMOL/L (ref 20–29)
CREAT SERPL-MCNC: 0.86 MG/DL (ref 0.57–1)
EOSINOPHIL # BLD AUTO: 0.1 X10E3/UL (ref 0–0.4)
EOSINOPHIL NFR BLD AUTO: 3 %
ERYTHROCYTE [DISTWIDTH] IN BLOOD BY AUTOMATED COUNT: 11.8 % (ref 11.7–15.4)
GLOBULIN SER CALC-MCNC: 3 G/DL (ref 1.5–4.5)
GLUCOSE SERPL-MCNC: 92 MG/DL (ref 65–99)
HCT VFR BLD AUTO: 42.7 % (ref 34–46.6)
HDLC SERPL-MCNC: 36 MG/DL
HGB BLD-MCNC: 14.9 G/DL (ref 11.1–15.9)
IMM GRANULOCYTES # BLD AUTO: 0 X10E3/UL (ref 0–0.1)
IMM GRANULOCYTES NFR BLD AUTO: 0 %
LDLC SERPL CALC-MCNC: 110 MG/DL (ref 0–99)
LYMPHOCYTES # BLD AUTO: 1.2 X10E3/UL (ref 0.7–3.1)
LYMPHOCYTES NFR BLD AUTO: 33 %
MCH RBC QN AUTO: 31.6 PG (ref 26.6–33)
MCHC RBC AUTO-ENTMCNC: 34.9 G/DL (ref 31.5–35.7)
MCV RBC AUTO: 91 FL (ref 79–97)
MONOCYTES # BLD AUTO: 0.5 X10E3/UL (ref 0.1–0.9)
MONOCYTES NFR BLD AUTO: 14 %
NEUTROPHILS # BLD AUTO: 1.8 X10E3/UL (ref 1.4–7)
NEUTROPHILS NFR BLD AUTO: 49 %
PLATELET # BLD AUTO: 267 X10E3/UL (ref 150–450)
POTASSIUM SERPL-SCNC: 4.2 MMOL/L (ref 3.5–5.2)
PROT SERPL-MCNC: 7.7 G/DL (ref 6–8.5)
RBC # BLD AUTO: 4.71 X10E6/UL (ref 3.77–5.28)
SODIUM SERPL-SCNC: 143 MMOL/L (ref 134–144)
T3FREE SERPL-MCNC: 3.5 PG/ML (ref 2–4.4)
T4 FREE SERPL-MCNC: 1.09 NG/DL (ref 0.82–1.77)
TRIGL SERPL-MCNC: 339 MG/DL (ref 0–149)
TSH SERPL DL<=0.005 MIU/L-ACNC: 1.77 UIU/ML (ref 0.45–4.5)
VLDLC SERPL CALC-MCNC: 59 MG/DL (ref 5–40)
WBC # BLD AUTO: 3.7 X10E3/UL (ref 3.4–10.8)

## 2022-01-17 NOTE — PROGRESS NOTES
Subjective:     Patient ID: Barbara Carter is a 51 y.o. female.    History of Present Illness Ms Carter is a very pleasant 51-year-old  female who is presenting today here for a follow-up on post-COVID headaches and migraines.   The patient reports that her post COVID headaches are much improved and states that naproxen 500 mg will control them.  She reports her migraines are much improved and states she is having approximately 1/month.  She stated she had 1 last week that was really bad but she did not take the sumatriptan hand until several hours after the migraine started.  She was educated on use of sumatriptan: Take 1 tablet at the onset of the migraine.  Do not wait until the headache has gotten severe.  If she takes the sumatriptan quick enough it should stop the migraine from ever starting.  She can also take a second dose after 2 hours if the headache still continues.  She reports Phenergan controls her nausea very well and she would like to continue that for nausea with headache.         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    Testing:   CT of the head completed 6/2/2021 at [facility]  Impression:  #1.  No acute intracranial abnormality.  Electronically signed by Robert Arias MD    The following portions of the patient's history were reviewed and updated as appropriate: allergies, current medications, past family history, past medical history, past social history, past surgical history and problem list.    Family History   Problem Relation Age of Onset   • Thyroid disease Mother    • Diabetes Mother         DMII   • Heart failure Mother    • Hypertension Mother    • Stroke Father    • Colon cancer Paternal Grandfather         Past Medical History:   Diagnosis Date   • Anxiety    • Arthritis    • Cluster headache    • Depression    • Fibromyalgia    • Hypothyroidism    • Peripheral neuropathy    • Sjogren's syndrome (HCC)        Social History     Socioeconomic History   • Marital status:    Tobacco Use   • Smoking status: Never Smoker   • Smokeless tobacco: Never Used   Vaping Use   • Vaping Use: Never used   Substance and Sexual Activity   • Alcohol use: No   • Drug use: No   • Sexual activity: Yes     Partners: Male     Birth control/protection: None         Current Outpatient Medications:   •  Artificial Saliva (SALIVAMAX) pack, Take 1 packet by mouth Daily. Dissolve 1 packet in 30 oz of water up to 10x daily. Swish half of solution x 1 minute, spit, then repeat., Disp: , Rfl:   •  aspirin (ASPIRIN ADULT LOW DOSE) 81 MG EC tablet, Take 1 tablet by mouth Daily., Disp: , Rfl:   •  Carboxymethylcellul-Glycerin (REFRESH OPTIVE) 0.5-0.9 % solution, Apply 1 drop to eye(s) as directed by provider As Needed., Disp: , Rfl:   •  cevimeline (Evoxac) 30 MG capsule, Take 1 capsule by mouth Every 8 (Eight) Hours., Disp: 90 capsule, Rfl: 1  •  clonazePAM (KlonoPIN) 0.5 MG tablet, Take 1 tablet by mouth Every 8 (Eight) Hours As Needed for Anxiety., Disp: 90 tablet, Rfl: 1  •  conjugated estrogens (Premarin) 0.625 MG/GM vaginal cream, Insert  into the vagina 2 (Two) Times a Week., Disp: 30 g, Rfl: 12  •  cyclobenzaprine (FLEXERIL) 10 MG tablet, Take 1 tablet by mouth At Night As Needed for Muscle Spasms., Disp: 30 tablet, Rfl: 0  •  DULoxetine (Cymbalta) 60 MG capsule, Take 1 capsule by mouth Daily., Disp: 90 capsule, Rfl: 5  •  ergocalciferol (ERGOCALCIFEROL) 1.25 MG (71693 UT) capsule, Take 1 capsule by mouth 2 (Two) Times a Week., Disp: 24 capsule, Rfl: 1  •  fluticasone (FLONASE ALLERGY RELIEF) 50 MCG/ACT nasal spray, 2 sprays into the nostril(s) as directed by provider Daily., Disp: , Rfl:   •  gabapentin (NEURONTIN) 300 MG capsule,  Take 2 capsules by mouth 3 (Three) Times a Day. For burning, Disp: 180 capsule, Rfl: 5  •  hydroxychloroquine (PLAQUENIL) 200 MG tablet, Take 2 tablets by mouth Daily., Disp: , Rfl:   •  Hyoscyamine Sulfate SL (Levsin/SL) 0.125 MG sublingual tablet, Place 1 tablet under the tongue 4 (Four) Times a Day As Needed (gut or bladder spasm)., Disp: 30 each, Rfl: 0  •  levothyroxine (Synthroid) 150 MCG tablet, Take 1 tablet by mouth Daily., Disp: 30 tablet, Rfl: 5  •  Lidocaine Viscous HCl (XYLOCAINE) 2 % solution, Take 10 mL by mouth As Needed for Moderate Pain . Dilute with salt water swish and spit up to 6 times a day, Disp: 100 mL, Rfl: 0  •  mometasone (Nasonex) 50 MCG/ACT nasal spray, 2 sprays into the nostril(s) as directed by provider Daily., Disp: 17 g, Rfl: 12  •  Multiple Vitamins-Minerals (CENTRUM ADULTS) tablet, Take 1 tablet by mouth Daily., Disp: , Rfl:   •  mupirocin (Bactroban) 2 % ointment, Pea size amount into each nostril bid, Disp: 22 g, Rfl: 1  •  naloxone (NARCAN) 4 MG/0.1ML nasal spray, 1 spray into the nostril(s) as directed by provider As Needed (narcotic overdose)., Disp: 1 each, Rfl: 1  •  naproxen (Naprosyn) 500 MG tablet, Take 1 pill q 12 hrs with food as needed for Migraine, Disp: 60 tablet, Rfl: 3  •  omeprazole (priLOSEC) 40 MG capsule, Take 1 capsule by mouth Daily., Disp: 30 capsule, Rfl: 5  •  ondansetron (Zofran) 8 MG tablet, Take 1 tablet by mouth Every 12 (Twelve) Hours As Needed for Nausea or Vomiting., Disp: 30 tablet, Rfl: 3  •  oxyCODONE-acetaminophen (Percocet) 5-325 MG per tablet, Take 1 tablet by mouth Every 6 (Six) Hours As Needed for Severe Pain . 1 tablet q hs and q4h PRN severe pain, Disp: 90 tablet, Rfl: 0  •  promethazine (PHENERGAN) 25 MG tablet, 1-2 q6h prn, likely sedating, Disp: 20 tablet, Rfl: 5  •  pseudoephedrine (Sudafed 12 Hour) 120 MG 12 hr tablet, Take 1 tablet by mouth Every 12 (Twelve) Hours., Disp: 60 tablet, Rfl: 2  •  psyllium (Metamucil Smooth Texture)  58.6 % powder, 1 teaspoon daily, increase by 1 teaspoon every 3 days to a max of 2 tablespoons, Disp: 425 g, Rfl: 12  •  SUMAtriptan (Imitrex) 100 MG tablet, Take 1 at onset of Migraine, may rpt in 2 hrs. Max 2 tab in 24h or 3 days/week, Disp: 18 tablet, Rfl: 3  •  traMADol (ULTRAM) 50 MG tablet, Take 2 tablets by mouth Every 6 (Six) Hours As Needed for Moderate Pain  or Severe Pain ., Disp: 360 tablet, Rfl: 1    Review of Systems   Constitutional: Negative for fatigue and fever.   HENT: Negative for ear discharge and ear pain.    Eyes: Negative for pain and itching.   Respiratory: Negative for cough and shortness of breath.    Cardiovascular: Negative for chest pain.   Gastrointestinal: Negative for abdominal pain and nausea.   Endocrine: Negative for cold intolerance.   Genitourinary: Negative for urgency.   Musculoskeletal: Positive for back pain and neck pain.   Neurological: Negative for dizziness and light-headedness.   Psychiatric/Behavioral: Negative for agitation and confusion.          I have reviewed ROS completed by medical assistant.     Objective:    Neurologic Exam     Mental Status   Oriented to person, place, and time.     Cranial Nerves     CN III, IV, VI   Pupils are equal, round, and reactive to light.      Physical Exam  Vitals and nursing note reviewed.   Constitutional:       Appearance: Normal appearance.   HENT:      Head: Normocephalic.      Right Ear: Tympanic membrane normal.      Nose: Nose normal.      Mouth/Throat:      Mouth: Mucous membranes are moist.   Eyes:      Extraocular Movements: Extraocular movements intact.      Pupils: Pupils are equal, round, and reactive to light.   Cardiovascular:      Rate and Rhythm: Normal rate and regular rhythm.   Pulmonary:      Effort: Pulmonary effort is normal.      Breath sounds: Normal breath sounds.   Musculoskeletal:         General: Normal range of motion.      Cervical back: Normal range of motion.   Skin:     General: Skin is warm.    Neurological:      General: No focal deficit present.      Mental Status: She is alert and oriented to person, place, and time.         Assessment/Plan:      Diagnoses and all orders for this visit:    1. Migraine with aura and without status migrainosus, not intractable (Primary)  Comments:  Take sumatriptan at the onset of migraine.  If you wait too long it will not be beneficial.  Orders:  -     Discontinue: SUMAtriptan (Imitrex) 100 MG tablet; Take 1 at onset of Migraine, may rpt in 2 hrs. Max 2 tab in 24h or 3 days/week  Dispense: 18 tablet; Refill: 3  -     promethazine (PHENERGAN) 25 MG tablet; 1-2 q6h prn, likely sedating  Dispense: 20 tablet; Refill: 5  -     SUMAtriptan (Imitrex) 100 MG tablet; Take 1 at onset of Migraine, may rpt in 2 hrs. Max 2 tab in 24h or 3 days/week  Dispense: 18 tablet; Refill: 3    2. Post-COVID chronic headache  Comments:  Continue naproxen 500 mg with food as needed up to every 12 hours    3. Nausea  Comments:  Phenergan 25 mg every 4 hours for nausea as needed  Orders:  -     promethazine (PHENERGAN) 25 MG tablet; 1-2 q6h prn, likely sedating  Dispense: 20 tablet; Refill: 5          Return in about 6 months (around 7/19/2022), or Yanna Marrufo.     I spent 20 minutes caring for Barbara on this date of service. This time includes time spent by me in the following activities: obtaining and/or reviewing a separately obtained history, performing a medically appropriate examination and/or evaluation, counseling and educating the patient/family/caregiver, ordering medications, tests, or procedures, referring and communicating with other health care professionals, documenting information in the medical record and independently interpreting results and communicating that information with the patient/family/caregiver.      This document has been electronically signed by MARY ANN Benavidez on January 19, 2022 12:05 EST

## 2022-01-19 ENCOUNTER — OFFICE VISIT (OUTPATIENT)
Dept: NEUROLOGY | Facility: CLINIC | Age: 52
End: 2022-01-19

## 2022-01-19 VITALS — WEIGHT: 217 LBS | HEIGHT: 68 IN | BODY MASS INDEX: 32.89 KG/M2 | TEMPERATURE: 98.2 F

## 2022-01-19 DIAGNOSIS — R51.9 POST-COVID CHRONIC HEADACHE: ICD-10-CM

## 2022-01-19 DIAGNOSIS — G43.109 MIGRAINE WITH AURA AND WITHOUT STATUS MIGRAINOSUS, NOT INTRACTABLE: Primary | ICD-10-CM

## 2022-01-19 DIAGNOSIS — R11.0 NAUSEA: ICD-10-CM

## 2022-01-19 DIAGNOSIS — U09.9 POST-COVID CHRONIC HEADACHE: ICD-10-CM

## 2022-01-19 DIAGNOSIS — G89.29 POST-COVID CHRONIC HEADACHE: ICD-10-CM

## 2022-01-19 PROCEDURE — 99213 OFFICE O/P EST LOW 20 MIN: CPT | Performed by: NURSE PRACTITIONER

## 2022-01-19 RX ORDER — PROMETHAZINE HYDROCHLORIDE 25 MG/1
TABLET ORAL
Qty: 20 TABLET | Refills: 5 | Status: SHIPPED | OUTPATIENT
Start: 2022-01-19 | End: 2022-07-20 | Stop reason: SDUPTHER

## 2022-01-19 RX ORDER — SUMATRIPTAN 100 MG/1
TABLET, FILM COATED ORAL
Qty: 18 TABLET | Refills: 3 | Status: SHIPPED | OUTPATIENT
Start: 2022-01-19 | End: 2022-07-20 | Stop reason: SDUPTHER

## 2022-01-19 RX ORDER — SUMATRIPTAN 100 MG/1
TABLET, FILM COATED ORAL
Qty: 18 TABLET | Refills: 3 | Status: SHIPPED | OUTPATIENT
Start: 2022-01-19 | End: 2022-01-19

## 2022-01-24 ENCOUNTER — OFFICE VISIT (OUTPATIENT)
Dept: FAMILY MEDICINE CLINIC | Facility: CLINIC | Age: 52
End: 2022-01-24

## 2022-01-24 VITALS
OXYGEN SATURATION: 98 % | SYSTOLIC BLOOD PRESSURE: 136 MMHG | HEIGHT: 68 IN | HEART RATE: 98 BPM | DIASTOLIC BLOOD PRESSURE: 78 MMHG | TEMPERATURE: 97.8 F | BODY MASS INDEX: 32.95 KG/M2 | RESPIRATION RATE: 18 BRPM | WEIGHT: 217.4 LBS

## 2022-01-24 DIAGNOSIS — R30.0 DYSURIA: ICD-10-CM

## 2022-01-24 DIAGNOSIS — R00.2 PALPITATION: ICD-10-CM

## 2022-01-24 DIAGNOSIS — R94.31 PROLONGED QT INTERVAL: ICD-10-CM

## 2022-01-24 DIAGNOSIS — Z00.00 ANNUAL PHYSICAL EXAM: Primary | ICD-10-CM

## 2022-01-24 LAB
BILIRUB BLD-MCNC: NEGATIVE MG/DL
CLARITY, POC: CLEAR
COLOR UR: YELLOW
GLUCOSE UR STRIP-MCNC: NEGATIVE MG/DL
KETONES UR QL: NEGATIVE
LEUKOCYTE EST, POC: NEGATIVE
NITRITE UR-MCNC: NEGATIVE MG/ML
PH UR: 6 [PH] (ref 5–8)
PROT UR STRIP-MCNC: ABNORMAL MG/DL
RBC # UR STRIP: NEGATIVE /UL
SP GR UR: 1.01 (ref 1–1.03)
UROBILINOGEN UR QL: NORMAL

## 2022-01-24 PROCEDURE — 99214 OFFICE O/P EST MOD 30 MIN: CPT | Performed by: FAMILY MEDICINE

## 2022-01-24 PROCEDURE — 93000 ELECTROCARDIOGRAM COMPLETE: CPT | Performed by: FAMILY MEDICINE

## 2022-01-24 PROCEDURE — 81003 URINALYSIS AUTO W/O SCOPE: CPT | Performed by: FAMILY MEDICINE

## 2022-01-24 PROCEDURE — 99396 PREV VISIT EST AGE 40-64: CPT | Performed by: FAMILY MEDICINE

## 2022-01-24 NOTE — PROGRESS NOTES
Chief Complaint  Annual Exam  History of Present Illness    Barbara Carter presents today for an annual exam and a couple of concerns.    Patient states that she may be getting a uti and took AZO Friday and Saturday, symptoms are better today.     Patient states that she also feels a flutter like feeling on the outer side of her left breast almost daily started within the past 3 weeks can last for several minutes.     Last Pap was July 2019    Patient had mammogram, BI-RADS 2, and DEXA scan was reported as normal both on March 22, 2021.    Last colonoscopy was 12/12/2012.  Was reported as normal.    Have had pain between shoulder blades and arm but not unusual due to cervical disk disease    Do have prolonged QT interval  Have had a loop recorder was normal (still in but not recording)    No recent follow up with Dr Edward, chart indicates she has follow-up scheduled in March    Patient has seen neurologist recently, Covid headaches have improved.  She is only rarely needing to take Imitrex at this time.    She has not had any other medication changes.    Current Outpatient Medications on File Prior to Visit   Medication Sig   • Artificial Saliva (SALIVAMAX) pack Take 1 packet by mouth Daily. Dissolve 1 packet in 30 oz of water up to 10x daily. Swish half of solution x 1 minute, spit, then repeat.   • aspirin (ASPIRIN ADULT LOW DOSE) 81 MG EC tablet Take 1 tablet by mouth Daily.   • Carboxymethylcellul-Glycerin (REFRESH OPTIVE) 0.5-0.9 % solution Apply 1 drop to eye(s) as directed by provider As Needed.   • cevimeline (Evoxac) 30 MG capsule Take 1 capsule by mouth Every 8 (Eight) Hours.   • clonazePAM (KlonoPIN) 0.5 MG tablet Take 1 tablet by mouth Every 8 (Eight) Hours As Needed for Anxiety.   • conjugated estrogens (Premarin) 0.625 MG/GM vaginal cream Insert  into the vagina 2 (Two) Times a Week.   • cyclobenzaprine (FLEXERIL) 10 MG tablet Take 1 tablet by mouth At Night As Needed for Muscle Spasms.   •  DULoxetine (Cymbalta) 60 MG capsule Take 1 capsule by mouth Daily.   • ergocalciferol (ERGOCALCIFEROL) 1.25 MG (74651 UT) capsule Take 1 capsule by mouth 2 (Two) Times a Week.   • fluticasone (FLONASE ALLERGY RELIEF) 50 MCG/ACT nasal spray 2 sprays into the nostril(s) as directed by provider Daily.   • gabapentin (NEURONTIN) 300 MG capsule Take 2 capsules by mouth 3 (Three) Times a Day. For burning   • hydroxychloroquine (PLAQUENIL) 200 MG tablet Take 2 tablets by mouth Daily.   • levothyroxine (Synthroid) 150 MCG tablet Take 1 tablet by mouth Daily.   • Lidocaine Viscous HCl (XYLOCAINE) 2 % solution Take 10 mL by mouth As Needed for Moderate Pain . Dilute with salt water swish and spit up to 6 times a day   • Multiple Vitamins-Minerals (CENTRUM ADULTS) tablet Take 1 tablet by mouth Daily.   • naloxone (NARCAN) 4 MG/0.1ML nasal spray 1 spray into the nostril(s) as directed by provider As Needed (narcotic overdose).   • naproxen (Naprosyn) 500 MG tablet Take 1 pill q 12 hrs with food as needed for Migraine   • omeprazole (priLOSEC) 40 MG capsule Take 1 capsule by mouth Daily.   • ondansetron (Zofran) 8 MG tablet Take 1 tablet by mouth Every 12 (Twelve) Hours As Needed for Nausea or Vomiting.   • oxyCODONE-acetaminophen (Percocet) 5-325 MG per tablet Take 1 tablet by mouth Every 6 (Six) Hours As Needed for Severe Pain . 1 tablet q hs and q4h PRN severe pain   • promethazine (PHENERGAN) 25 MG tablet 1-2 q6h prn, likely sedating   • pseudoephedrine (Sudafed 12 Hour) 120 MG 12 hr tablet Take 1 tablet by mouth Every 12 (Twelve) Hours.   • psyllium (Metamucil Smooth Texture) 58.6 % powder 1 teaspoon daily, increase by 1 teaspoon every 3 days to a max of 2 tablespoons   • SUMAtriptan (Imitrex) 100 MG tablet Take 1 at onset of Migraine, may rpt in 2 hrs. Max 2 tab in 24h or 3 days/week   • traMADol (ULTRAM) 50 MG tablet Take 2 tablets by mouth Every 6 (Six) Hours As Needed for Moderate Pain  or Severe Pain .   •  "[DISCONTINUED] Hyoscyamine Sulfate SL (Levsin/SL) 0.125 MG sublingual tablet Place 1 tablet under the tongue 4 (Four) Times a Day As Needed (gut or bladder spasm).   • [DISCONTINUED] mometasone (Nasonex) 50 MCG/ACT nasal spray 2 sprays into the nostril(s) as directed by provider Daily.   • [DISCONTINUED] mupirocin (Bactroban) 2 % ointment Pea size amount into each nostril bid     No current facility-administered medications on file prior to visit.       Objective   Vital Signs:   /78   Pulse 98   Temp 97.8 °F (36.6 °C) (Temporal)   Resp 18   Ht 172.7 cm (68\")   Wt 98.6 kg (217 lb 6.4 oz)   SpO2 98%   BMI 33.06 kg/m²       Physical Exam  Constitutional:       General: She is not in acute distress.     Appearance: She is well-developed.   HENT:      Head: Normocephalic and atraumatic.      Right Ear: External ear normal.      Left Ear: External ear normal.   Eyes:      Conjunctiva/sclera: Conjunctivae normal.      Pupils: Pupils are equal, round, and reactive to light.   Neck:      Thyroid: No thyromegaly.      Trachea: No tracheal deviation.   Cardiovascular:      Rate and Rhythm: Normal rate and regular rhythm.      Pulses: Normal pulses.      Heart sounds: Normal heart sounds. No murmur heard.  No friction rub. No gallop.    Pulmonary:      Effort: Pulmonary effort is normal. No respiratory distress.      Breath sounds: Normal breath sounds. No wheezing or rales.      Comments: Breasts: breasts appear normal, no suspicious masses, no skin or nipple changes or axillary nodes.    Chest:      Chest wall: No tenderness.   Abdominal:      General: Bowel sounds are normal.      Palpations: Abdomen is soft.      Tenderness: There is no abdominal tenderness.   Genitourinary:     Comments: Pelvic exam deferred, Pap is due in July  Musculoskeletal:      Cervical back: Normal range of motion and neck supple.   Lymphadenopathy:      Cervical: No cervical adenopathy.   Skin:     General: Skin is warm and dry.      " Findings: No rash.   Neurological:      Mental Status: She is alert and oriented to person, place, and time.   Psychiatric:         Behavior: Behavior normal.            Office Visit on 01/24/2022   Component Date Value Ref Range Status   • Color 01/24/2022 Yellow  Yellow, Straw, Dark Yellow, Mercedes Final   • Clarity, UA 01/24/2022 Clear  Clear Final   • Glucose, UA 01/24/2022 Negative  Negative, 1000 mg/dL (3+) mg/dL Final   • Bilirubin 01/24/2022 Negative  Negative Final   • Ketones, UA 01/24/2022 Negative  Negative Final   • Specific Gravity  01/24/2022 1.015  1.005 - 1.030 Final   • Blood, UA 01/24/2022 Negative  Negative Final   • pH, Urine 01/24/2022 6.0  5.0 - 8.0 Final   • Protein, POC 01/24/2022 Trace* Negative mg/dL Final   • Urobilinogen, UA 01/24/2022 Normal  Normal Final   • Nitrite, UA 01/24/2022 Negative  Negative Final   • Leukocytes 01/24/2022 Negative  Negative Final       Lab Results   Component Value Date    BUN 9 01/03/2022    CREATININE 0.86 01/03/2022    EGFRIFNONA 78 01/03/2022    EGFRIFAFRI 90 01/03/2022     01/03/2022    K 4.2 01/03/2022     01/03/2022    CALCIUM 10.0 01/03/2022    ALBUMIN 4.7 01/03/2022    BILITOT 0.3 01/03/2022    ALKPHOS 86 01/03/2022    AST 19 01/03/2022    ALT 17 01/03/2022    CHLPL 205 (H) 01/03/2022    TRIG 339 (H) 01/03/2022    HDL 36 (L) 01/03/2022    VLDL 59 (H) 01/03/2022     (H) 01/03/2022    WBC 3.7 01/03/2022    RBC 4.71 01/03/2022    HCT 42.7 01/03/2022    MCV 91 01/03/2022    MCH 31.6 01/03/2022    TSH 1.770 01/03/2022    FREET4 1.09 01/03/2022    EUGJ96MM 53.9 01/03/2022      Office Visit on 01/24/2022   Component Date Value Ref Range Status   • Color 01/24/2022 Yellow  Yellow, Straw, Dark Yellow, Mercedes Final   • Clarity, UA 01/24/2022 Clear  Clear Final   • Glucose, UA 01/24/2022 Negative  Negative, 1000 mg/dL (3+) mg/dL Final   • Bilirubin 01/24/2022 Negative  Negative Final   • Ketones, UA 01/24/2022 Negative  Negative Final   •  Specific Gravity  01/24/2022 1.015  1.005 - 1.030 Final   • Blood, UA 01/24/2022 Negative  Negative Final   • pH, Urine 01/24/2022 6.0  5.0 - 8.0 Final   • Protein, POC 01/24/2022 Trace* Negative mg/dL Final   • Urobilinogen, UA 01/24/2022 Normal  Normal Final   • Nitrite, UA 01/24/2022 Negative  Negative Final   • Leukocytes 01/24/2022 Negative  Negative Final       No results found for: HGBA1C      ECG 12 Lead    Date/Time: 1/24/2022 6:40 PM  Performed by: Dilia Griffin MD  Authorized by: Dilia Griffin MD   Comparison: compared with previous ECG from 3/22/2021  Similar to previous ECG  Comparison to previous ECG: QT interval on previous was 450 µs otherwise very similar  Rhythm: sinus rhythm  Rate: normal  BPM: 64  Conduction: conduction normal  ST Segments: ST segments normal  T Waves: T waves normal  QRS axis: normal  Other: no other findings    Clinical impression: abnormal EKG  Comments: Prolonged QT interval, sinus rhythm                Assessment and Plan    Diagnoses and all orders for this visit:    1. Annual physical exam (Primary)    2. Palpitation  -     Cancel: ECG 12 Lead; Future  -     Cancel: ECG 12 Lead  -     ECG 12 Lead    3. Prolonged QT interval  -     Cancel: ECG 12 Lead; Future  -     Cancel: ECG 12 Lead  -     ECG 12 Lead    4. Dysuria  -     POCT urinalysis dipstick, multipro      Fluttering in chest, most consistent with palpitations and possible intermittent arrhythmia. History of prolonged QT interval, EKG today shows sinus rhythm however QT interval is prolonged and slightly increased from previous at 462 ms corrected QT is 472 ms. We are placing a 23 hour Holter monitor. Loop recorder 3 years ago did not reveal any arrhythmias. Advised her to contact Dr. Edward to get a sooner appointment, is scheduled in March for annual follow-up.    The patient was counseled regarding nutrition, physical activity, healthy weight, injury prevention, immunizations and  preventative health screenings.    She will need Pap in July.  Reminded her to schedule Covid booster.    Dysuria with normal urine today, uncertain etiology      Medications Discontinued During This Encounter   Medication Reason   • mupirocin (Bactroban) 2 % ointment *Therapy completed   • mometasone (Nasonex) 50 MCG/ACT nasal spray *Therapy completed   • Hyoscyamine Sulfate SL (Levsin/SL) 0.125 MG sublingual tablet *Therapy completed         Follow Up     Return in about 6 months (around 7/24/2022) for PAP.    Patient was given instructions and counseling regarding her condition or for health maintenance advice. Please see specific information pulled into the AVS if appropriate.

## 2022-01-25 ENCOUNTER — CLINICAL SUPPORT (OUTPATIENT)
Dept: FAMILY MEDICINE CLINIC | Facility: CLINIC | Age: 52
End: 2022-01-25

## 2022-01-25 DIAGNOSIS — R00.2 PALPITATION: ICD-10-CM

## 2022-01-25 DIAGNOSIS — R94.31 PROLONGED QT INTERVAL: Primary | ICD-10-CM

## 2022-01-28 ENCOUNTER — TELEPHONE (OUTPATIENT)
Dept: FAMILY MEDICINE CLINIC | Facility: CLINIC | Age: 52
End: 2022-01-28

## 2022-01-28 NOTE — TELEPHONE ENCOUNTER
Dr. Edward forwarding you this message as an FYI, requesting she schedule an appointment with you.    [place],  Please inform patient I have reviewed her Holter monitor.  There are some changes that I think she needs to see Dr. Edward sooner than scheduled in March.  There were a few pauses, as well as some ST depression that could indicate ischemia as well as the prolonged QT interval.  There were some ventricular ectopic beats.  These are usually very benign but may be responsible for the fluttering feeling she has had in her chest.  And of course if she develops any chest pressure, chest pain, or shortness of breath she should go immediately to emergency department.  Dilia Griffin MD

## 2022-01-28 NOTE — TELEPHONE ENCOUNTER
Called twice today and had messages sent to his ma to see if we could get her in next week. The second time they said they could see if on call could get her scheduled in. I have not heard back yet. Patient notified

## 2022-01-28 NOTE — TELEPHONE ENCOUNTER
PATIENT IS ASKING ME TO CALL  OFFICE AND GET THIS SCHEDULED FOR ASAP AND ALSO IS ASKING IF SHE STILL HAS TO SEE YOU ON Monday?

## 2022-01-28 NOTE — TELEPHONE ENCOUNTER
Assuming she can get in with Dr. Edward in the next week or so she does not need to see me on Monday, she was having other concerns as well.

## 2022-01-31 ENCOUNTER — OFFICE VISIT (OUTPATIENT)
Dept: CARDIOLOGY | Facility: CLINIC | Age: 52
End: 2022-01-31

## 2022-01-31 VITALS
BODY MASS INDEX: 32.58 KG/M2 | WEIGHT: 215 LBS | OXYGEN SATURATION: 97 % | HEART RATE: 82 BPM | SYSTOLIC BLOOD PRESSURE: 143 MMHG | HEIGHT: 68 IN | DIASTOLIC BLOOD PRESSURE: 80 MMHG

## 2022-01-31 DIAGNOSIS — R55 VASOVAGAL SYNCOPE: ICD-10-CM

## 2022-01-31 DIAGNOSIS — I45.81 LONG Q-T SYNDROME: Primary | ICD-10-CM

## 2022-01-31 DIAGNOSIS — Z98.890 HISTORY OF LOOP RECORDER: ICD-10-CM

## 2022-01-31 DIAGNOSIS — R00.2 PALPITATIONS: ICD-10-CM

## 2022-01-31 PROCEDURE — 93000 ELECTROCARDIOGRAM COMPLETE: CPT | Performed by: INTERNAL MEDICINE

## 2022-01-31 PROCEDURE — 99214 OFFICE O/P EST MOD 30 MIN: CPT | Performed by: INTERNAL MEDICINE

## 2022-01-31 NOTE — PROGRESS NOTES
CC--syncope and loop recorder in situ    Sub  51-year-old female patient with family history of long QT syndrome  Patient's family members were diagnosed with KCNQ1, diagnosis  is being made and the nephew, niece and 1 of her sister  Patient has history of syncope and a Medtronic loop recorder was implanted at [facility] cardiologist  Patient's electrophysiologist left practice and patient came for establishment of practice  Patient has known diagnosis of Sjogren's syndrome and intolerance of beta-blockers because of severe drug-induced bradycardia  She does have syncope and the syncope is preceded by symptoms of lightheadedness and followed by symptoms of prolonged fatigue highly suggestive of vasovagal syncope  Since her loop recorder implantation no arrhythmias were diagnosed  She denies any recent syncope in the last few months and denies any other cardiovascular symptoms  Complains of palpitations  Complains of atypical left arm pain  She was complaining of palpitations and a monitor was placed  which showed possible dynamic ST changes and patient has been sent for evaluation        Past Medical History:   Diagnosis Date   • Hypothyroidism    • Sjogren's syndrome (CMS/HCC)      Past Surgical History:   Procedure Laterality Date   • APPENDECTOMY     •  SECTION     • CHOLECYSTECTOMY         Physical Exam    General:      well developed, well nourished, in no acute distress.    Head:      normocephalic and atraumatic.    Eyes:      PERRL/EOM intact, conjunctiva and sclera clear with out nystagmus.    Neck:      no masses, thyromegaly, trachea central with normal respiratory effort  Lungs:      clear bilaterally to auscultation.    Heart:       regular rate and rhythm, S1, S2 without murmurs, rubs, or gallops        Assessment plan    History of syncope highly suspicious for vasovagal syncope--plenty of hydration educated to patient--no syncope a several months  Family history of long QT  syndrome and patient's EKG does reveal mildly prolonged QT  Internal loop recorder in situ and loop recorder interrogation revealed loop recorder battery depletion in the past  No arrhythmias noted on loop recorder interrogation  History of Sjogren's syndrome and hypothyroidism  Patient educated about plenty of hydration to avoid vasovagal episodes  Role of plaquenil in long QT educated  Smart watch apps educated--I strongly encouraged recording of her symptoms with a smart watch application for surveillance   Patient was also educated about Plaquenil causing mild QT prolongation   recent holter with dynamic ST changes, arm pain --get TMT  I have requested Holter monitor tracings to review  Follow-up in 1 month  Medications reviewed  Potassium is 4.2, TSH is normal   being followed by primary care        ECG 12 Lead    Date/Time: 1/31/2022 4:10 PM  Performed by: Alexis Edward MD  Authorized by: Alexis Edward MD   Comparison: compared with previous ECG   Similar to previous ECG  Rhythm: sinus rhythm  Rate: normal  Conduction: conduction normal  QRS axis: normal  Other findings: non-specific ST-T wave changes  Comments: Artifacts and nonspecific ST-T wave changes noted and QT interval of 418 ms          Electronically signed by Alexis Edward MD, 01/31/22, 4:10 PM EST.

## 2022-02-08 ENCOUNTER — HOSPITAL ENCOUNTER (OUTPATIENT)
Dept: RESPIRATORY THERAPY | Facility: HOSPITAL | Age: 52
Discharge: HOME | End: 2022-02-08
Admitting: INTERNAL MEDICINE

## 2022-02-08 DIAGNOSIS — I45.81 LONG Q-T SYNDROME: ICD-10-CM

## 2022-02-08 DIAGNOSIS — R00.2 PALPITATIONS: ICD-10-CM

## 2022-02-08 LAB
BH CV STRESS BP STAGE 1: NORMAL
BH CV STRESS BP STAGE 2: NORMAL
BH CV STRESS DURATION MIN STAGE 1: 3
BH CV STRESS DURATION MIN STAGE 2: 3
BH CV STRESS DURATION MIN STAGE 3: 0
BH CV STRESS DURATION SEC STAGE 1: 0
BH CV STRESS DURATION SEC STAGE 2: 0
BH CV STRESS DURATION SEC STAGE 3: 10
BH CV STRESS GRADE STAGE 1: 10
BH CV STRESS GRADE STAGE 2: 12
BH CV STRESS GRADE STAGE 3: 14
BH CV STRESS HR STAGE 1: 103
BH CV STRESS HR STAGE 2: 123
BH CV STRESS HR STAGE 3: 123
BH CV STRESS METS STAGE 1: 5
BH CV STRESS METS STAGE 2: 7.5
BH CV STRESS METS STAGE 3: 10
BH CV STRESS PROTOCOL 1: NORMAL
BH CV STRESS RECOVERY BP: NORMAL MMHG
BH CV STRESS RECOVERY HR: 77 BPM
BH CV STRESS SPEED STAGE 1: 1.7
BH CV STRESS SPEED STAGE 2: 2.5
BH CV STRESS SPEED STAGE 3: 3.4
BH CV STRESS STAGE 1: 1
BH CV STRESS STAGE 2: 2
BH CV STRESS STAGE 3: 3
MAXIMAL PREDICTED HEART RATE: 169 BPM
PERCENT MAX PREDICTED HR: 73.37 %
STRESS BASELINE BP: NORMAL MMHG
STRESS BASELINE HR: 85 BPM
STRESS PERCENT HR: 86 %
STRESS POST ESTIMATED WORKLOAD: 7 METS
STRESS POST EXERCISE DUR MIN: 6 MIN
STRESS POST EXERCISE DUR SEC: 10 SEC
STRESS POST PEAK BP: NORMAL MMHG
STRESS POST PEAK HR: 124 BPM
STRESS TARGET HR: 144 BPM

## 2022-02-08 PROCEDURE — 93018 CV STRESS TEST I&R ONLY: CPT | Performed by: INTERNAL MEDICINE

## 2022-02-08 PROCEDURE — 93017 CV STRESS TEST TRACING ONLY: CPT

## 2022-03-07 ENCOUNTER — OFFICE VISIT (OUTPATIENT)
Dept: CARDIOLOGY | Facility: CLINIC | Age: 52
End: 2022-03-07

## 2022-03-07 VITALS
BODY MASS INDEX: 32.28 KG/M2 | RESPIRATION RATE: 18 BRPM | HEIGHT: 68 IN | WEIGHT: 213 LBS | OXYGEN SATURATION: 98 % | DIASTOLIC BLOOD PRESSURE: 82 MMHG | SYSTOLIC BLOOD PRESSURE: 130 MMHG | HEART RATE: 67 BPM

## 2022-03-07 DIAGNOSIS — R55 VASOVAGAL SYNCOPE: ICD-10-CM

## 2022-03-07 DIAGNOSIS — Z98.890 HISTORY OF LOOP RECORDER: Primary | ICD-10-CM

## 2022-03-07 DIAGNOSIS — R00.2 PALPITATIONS: ICD-10-CM

## 2022-03-07 PROCEDURE — 99213 OFFICE O/P EST LOW 20 MIN: CPT | Performed by: INTERNAL MEDICINE

## 2022-03-07 NOTE — PROGRESS NOTES
CC--syncope and loop recorder in situ    Sub  51-year-old female patient with family history of long QT syndrome  Patient's family members were diagnosed with KCNQ1, diagnosis  is being made and the nephew, niece and 1 of her sister  Patient has history of syncope and a Medtronic loop recorder was implanted at [facility] cardiologist  Patient's electrophysiologist left practice and patient came for establishment of practice  Patient has known diagnosis of Sjogren's syndrome and intolerance of beta-blockers because of severe drug-induced bradycardia  She does have syncope and the syncope is preceded by symptoms of lightheadedness and followed by symptoms of prolonged fatigue highly suggestive of vasovagal syncope  Since her loop recorder implantation no arrhythmias were diagnosed  She denies any recent syncope in the last few months and denies any other cardiovascular symptoms  Complains of palpitations  Complains of atypical left arm pain  She was complaining of palpitations and a monitor was placed  which showed possible dynamic ST changes and patient has been sent for evaluation  She underwent a treadmill testing and came for follow-up        Past Medical History:   Diagnosis Date   • Hypothyroidism    • Sjogren's syndrome (CMS/HCC)      Past Surgical History:   Procedure Laterality Date   • APPENDECTOMY     •  SECTION     • CHOLECYSTECTOMY         Physical Exam    General:      well developed, well nourished, in no acute distress.    Head:      normocephalic and atraumatic.    Eyes:      PERRL/EOM intact, conjunctiva and sclera clear with out nystagmus.    Neck:      no masses, thyromegaly, trachea central with normal respiratory effort  Lungs:      clear bilaterally to auscultation.    Heart:       regular rate and rhythm, S1, S2 without murmurs, rubs, or gallops        Assessment plan    History of syncope highly suspicious for vasovagal syncope--plenty of hydration educated to patient--no  syncope a several months  Family history of long QT syndrome and patient's EKG does reveal mildly prolonged QT  Internal loop recorder in situ and loop recorder interrogation revealed loop recorder battery depletion in the past  No arrhythmias noted on loop recorder interrogation  History of Sjogren's syndrome and hypothyroidism  Patient educated about plenty of hydration to avoid vasovagal episodes  Role of plaquenil in long QT educated  Smart watch recordings reviewed without arrhythmias and patient encouraged to continue recording on a smart watch to correlate for any symptoms in future for any occult arrhythmias.     recent holter with dynamic ST changes, arm pain --personally reviewed revealing nonspecific dynamic changes without significant pauses or arrhythmias.  Treadmill testing without any significant exercise-induced arrhythmias or any evidence of any ischemia which was discussed with the patient.  Follow-up in 6 months        Electronically signed by Alexis Edward MD, 03/07/22, 4:53 PM EST.

## 2022-03-11 DIAGNOSIS — F41.9 ANXIETY: ICD-10-CM

## 2022-03-11 RX ORDER — CLONAZEPAM 0.5 MG/1
0.5 TABLET ORAL EVERY 8 HOURS PRN
Qty: 90 TABLET | Refills: 1 | Status: SHIPPED | OUTPATIENT
Start: 2022-03-11 | End: 2022-05-09 | Stop reason: SDUPTHER

## 2022-04-11 DIAGNOSIS — K21.9 GASTROESOPHAGEAL REFLUX DISEASE, UNSPECIFIED WHETHER ESOPHAGITIS PRESENT: ICD-10-CM

## 2022-04-11 DIAGNOSIS — E03.9 ACQUIRED HYPOTHYROIDISM: ICD-10-CM

## 2022-04-11 RX ORDER — LEVOTHYROXINE SODIUM 0.15 MG/1
150 TABLET ORAL EVERY 24 HOURS
Qty: 30 TABLET | Refills: 5 | Status: SHIPPED | OUTPATIENT
Start: 2022-04-11 | End: 2022-10-17 | Stop reason: SDUPTHER

## 2022-04-11 RX ORDER — OMEPRAZOLE 40 MG/1
40 CAPSULE, DELAYED RELEASE ORAL DAILY
Qty: 30 CAPSULE | Refills: 5 | Status: SHIPPED | OUTPATIENT
Start: 2022-04-11 | End: 2022-10-17 | Stop reason: SDUPTHER

## 2022-05-09 ENCOUNTER — TELEPHONE (OUTPATIENT)
Dept: FAMILY MEDICINE CLINIC | Facility: CLINIC | Age: 52
End: 2022-05-09

## 2022-05-09 DIAGNOSIS — M50.123 CERVICAL DISC DISORDER AT C6-C7 LEVEL WITH RADICULOPATHY: ICD-10-CM

## 2022-05-09 DIAGNOSIS — F41.9 ANXIETY: ICD-10-CM

## 2022-05-09 RX ORDER — GABAPENTIN 300 MG/1
600 CAPSULE ORAL 3 TIMES DAILY
Qty: 180 CAPSULE | Refills: 0 | Status: SHIPPED | OUTPATIENT
Start: 2022-05-09 | End: 2022-09-30 | Stop reason: SDUPTHER

## 2022-05-09 RX ORDER — CLONAZEPAM 0.5 MG/1
0.5 TABLET ORAL EVERY 8 HOURS PRN
Qty: 90 TABLET | Refills: 0 | Status: SHIPPED | OUTPATIENT
Start: 2022-05-09 | End: 2022-12-13 | Stop reason: SDUPTHER

## 2022-05-09 NOTE — TELEPHONE ENCOUNTER
Please inform patient I am renewing gabapentin and gabapentin as requested however she is on multiple controlled substance medications and needs an appointment for anxiety and pain management prior to additional refills.

## 2022-05-10 NOTE — TELEPHONE ENCOUNTER
Called pt and left detailed voice message for pt to call back and schedule an appointment to get her medications refilled.

## 2022-07-20 ENCOUNTER — TELEMEDICINE (OUTPATIENT)
Dept: NEUROLOGY | Facility: CLINIC | Age: 52
End: 2022-07-20

## 2022-07-20 DIAGNOSIS — G43.009 MIGRAINE WITHOUT AURA AND WITHOUT STATUS MIGRAINOSUS, NOT INTRACTABLE: Primary | ICD-10-CM

## 2022-07-20 DIAGNOSIS — G43.109 MIGRAINE WITH AURA AND WITHOUT STATUS MIGRAINOSUS, NOT INTRACTABLE: ICD-10-CM

## 2022-07-20 DIAGNOSIS — R11.0 NAUSEA: ICD-10-CM

## 2022-07-20 PROCEDURE — 99213 OFFICE O/P EST LOW 20 MIN: CPT | Performed by: NURSE PRACTITIONER

## 2022-07-20 RX ORDER — SUMATRIPTAN 100 MG/1
TABLET, FILM COATED ORAL
Qty: 18 TABLET | Refills: 11 | Status: SHIPPED | OUTPATIENT
Start: 2022-07-20

## 2022-07-20 RX ORDER — PROMETHAZINE HYDROCHLORIDE 25 MG/1
TABLET ORAL
Qty: 20 TABLET | Refills: 5 | Status: SHIPPED | OUTPATIENT
Start: 2022-07-20

## 2022-07-20 NOTE — PROGRESS NOTES
Ms. Carter is a very pleasant 51-year-old  female who was seen today via telephone follow-up due to technical difficulties with virtual appointment.  She is currently followed by this clinic for chronic migraine and post COVID headaches.  Today the patient states that her post COVID headaches are no longer a problem and she was notified that she can discontinue the naproxen.    She reports her migraines are under good control with sumatriptan and her nausea is controlled with Phenergan.  She states she did have some difficulty determining whether it is a migraine or other headache.  She was notified that abnormal headache does not have phonophobia or photophobia and that she could look at a light and see if it is very irritating and that would be a signal that this is a migraine headache.  She was notified if she did not take the sumatriptan hand early enough that it would not control the headache.  She reports overall she has had a decrease in frequency of headaches approximately 3 to 4/month and is very pleased.

## 2022-08-09 NOTE — PROGRESS NOTES
Chief Complaint  pain managment and Anxiety     History of Present Illness  Barbara Carter presents today for anxiety and pain management visit for chronic pain from fibromyalgia and chronic low back pain. She takes 1 Percocet at night and 2 tramadol tablets in the mornings.  She is also on Cymbalta for dual treatment of chronic pain and for anxiety.    Patient reports her medications are currently keeping her pain and anxiety well controlled. Pain contract updated today and Inspect ran.   Reviewing inspect reports she last picked up a prescription for Percocet No. 90 on January 7, 2022 from a prescription that was written on November 2, 2022 she had picked up a small prescription for #28 in December.  She states she has only 2 Percocet remaining.  On less painful days she has substituted a dose of tramadol instead of Percocet at bedtime.  May have made this substitution more days that she recalled or potentially had built up a small surplus previously.  She states on more severe pain days the Percocet is significantly superior for pain control then to tramadol.        Current Outpatient Medications on File Prior to Visit   Medication Sig   • Artificial Saliva (SALIVAMAX) pack Take 1 packet by mouth Daily. Dissolve 1 packet in 30 oz of water up to 10x daily. Swish half of solution x 1 minute, spit, then repeat.   • aspirin (aspirin) 81 MG EC tablet Take 1 tablet by mouth Daily.   • Carboxymethylcellul-Glycerin 0.5-0.9 % solution Apply 1 drop to eye(s) as directed by provider As Needed.   • cevimeline (Evoxac) 30 MG capsule Take 1 capsule by mouth Every 8 (Eight) Hours.   • clonazePAM (KlonoPIN) 0.5 MG tablet Take 1 tablet by mouth Every 8 (Eight) Hours As Needed for Anxiety.   • conjugated estrogens (Premarin) 0.625 MG/GM vaginal cream Insert  into the vagina 2 (Two) Times a Week.   • cyclobenzaprine (FLEXERIL) 10 MG tablet Take 1 tablet by mouth At Night As Needed for Muscle Spasms.   • DULoxetine (Cymbalta) 60 MG  capsule Take 1 capsule by mouth Daily.   • fluticasone (FLONASE) 50 MCG/ACT nasal spray 2 sprays into the nostril(s) as directed by provider Daily.   • gabapentin (NEURONTIN) 300 MG capsule Take 2 capsules by mouth 3 (Three) Times a Day. For burning   • hydroxychloroquine (PLAQUENIL) 200 MG tablet Take 2 tablets by mouth Daily.   • levothyroxine (Synthroid) 150 MCG tablet Take 1 tablet by mouth Daily.   • Lidocaine Viscous HCl (XYLOCAINE) 2 % solution Take 10 mL by mouth As Needed for Moderate Pain . Dilute with salt water swish and spit up to 6 times a day   • Multiple Vitamins-Minerals (CENTRUM ADULTS) tablet Take 1 tablet by mouth Daily.   • omeprazole (priLOSEC) 40 MG capsule Take 1 capsule by mouth Daily.   • ondansetron (Zofran) 8 MG tablet Take 1 tablet by mouth Every 12 (Twelve) Hours As Needed for Nausea or Vomiting.   • promethazine (PHENERGAN) 25 MG tablet 1-2 q6h prn, likely sedating   • psyllium (Metamucil Smooth Texture) 58.6 % powder 1 teaspoon daily, increase by 1 teaspoon every 3 days to a max of 2 tablespoons   • SUMAtriptan (Imitrex) 100 MG tablet Take 1 at onset of Migraine, may rpt in 2 hrs. Max 2 tab in 24h or 3 days/week   • [DISCONTINUED] ergocalciferol (ERGOCALCIFEROL) 1.25 MG (71917 UT) capsule Take 1 capsule by mouth 2 (Two) Times a Week.   • [DISCONTINUED] oxyCODONE-acetaminophen (Percocet) 5-325 MG per tablet Take 1 tablet by mouth Every 6 (Six) Hours As Needed for Severe Pain . 1 tablet q hs and q4h PRN severe pain   • [DISCONTINUED] traMADol (ULTRAM) 50 MG tablet Take 2 tablets by mouth Every 6 (Six) Hours As Needed for Moderate Pain  or Severe Pain .   • naloxone (NARCAN) 4 MG/0.1ML nasal spray 1 spray into the nostril(s) as directed by provider As Needed (narcotic overdose).   • naproxen (Naprosyn) 500 MG tablet Take 1 pill q 12 hrs with food as needed for Migraine   • pseudoephedrine (Sudafed 12 Hour) 120 MG 12 hr tablet Take 1 tablet by mouth Every 12 (Twelve) Hours.     No current  "facility-administered medications on file prior to visit.       Objective   Vital Signs:   /62   Pulse 86   Temp 98 °F (36.7 °C)   Resp 20   Ht 172.7 cm (67.99\")   Wt 96 kg (211 lb 9.6 oz)   SpO2 99%   BMI 32.18 kg/m²       Physical Exam  Vitals and nursing note reviewed.   Constitutional:       General: She is not in acute distress.     Appearance: Normal appearance. She is well-developed.   HENT:      Head: Normocephalic and atraumatic.   Eyes:      Conjunctiva/sclera: Conjunctivae normal.      Pupils: Pupils are equal, round, and reactive to light.   Neck:      Thyroid: No thyromegaly.      Vascular: No JVD.   Cardiovascular:      Rate and Rhythm: Normal rate and regular rhythm.      Heart sounds: Normal heart sounds. No murmur heard.  Pulmonary:      Effort: Pulmonary effort is normal.      Breath sounds: Normal breath sounds. No wheezing or rales.   Musculoskeletal:         General: Normal range of motion.      Cervical back: Normal range of motion.      Comments: Tender top palpation in low back midline greater than Paraspinous muscles and SI /sciatica area. Neg straight leg raises.    Lymphadenopathy:      Cervical: No cervical adenopathy.   Skin:     General: Skin is warm and dry.      Findings: No rash.   Neurological:      Mental Status: She is alert and oriented to person, place, and time.   Psychiatric:         Mood and Affect: Mood normal.         Behavior: Behavior normal.         Thought Content: Thought content normal.         Judgment: Judgment normal.            No visits with results within 1 Day(s) from this visit.   Latest known visit with results is:   Hospital Outpatient Visit on 02/08/2022   Component Date Value Ref Range Status   • Target HR (85%) 02/08/2022 144  bpm Final   • Max. Pred. HR (100%) 02/08/2022 169  bpm Final   • BH CV STRESS PROTOCOL 1 02/08/2022 Cesar   Final   • Stage 1 02/08/2022 1   Final   • HR Stage 1 02/08/2022 103   Final   • BP Stage 1 02/08/2022 120/90   " Final   • Duration Min Stage 1 02/08/2022 3   Final   • Duration Sec Stage 1 02/08/2022 0   Final   • Grade Stage 1 02/08/2022 10   Final   • Speed Stage 1 02/08/2022 1.7   Final   • BH CV STRESS METS STAGE 1 02/08/2022 5   Final   • Stage 2 02/08/2022 2   Final   • HR Stage 2 02/08/2022 123   Final   • BP Stage 2 02/08/2022 140/90   Final   • Duration Min Stage 2 02/08/2022 3   Final   • Duration Sec Stage 2 02/08/2022 0   Final   • Grade Stage 2 02/08/2022 12   Final   • Speed Stage 2 02/08/2022 2.5   Final   • BH CV STRESS METS STAGE 2 02/08/2022 7.5   Final   • Stage 3 02/08/2022 3   Final   • HR Stage 3 02/08/2022 123   Final   • Duration Min Stage 3 02/08/2022 0   Final   • Duration Sec Stage 3 02/08/2022 10   Final   • Grade Stage 3 02/08/2022 14   Final   • Speed Stage 3 02/08/2022 3.4   Final   • BH CV STRESS METS STAGE 3 02/08/2022 10.0   Final   • Baseline HR 02/08/2022 85  bpm Final   • Baseline BP 02/08/2022 110/80  mmHg Final   • Peak HR 02/08/2022 124  bpm Final   • Percent Max Pred HR 02/08/2022 73.37  % Final   • Percent Target HR 02/08/2022 86  % Final   • Peak BP 02/08/2022 140/90  mmHg Final   • Recovery HR 02/08/2022 77  bpm Final   • Recovery BP 02/08/2022 120/80  mmHg Final   • Exercise duration (min) 02/08/2022 6  min Final   • Exercise duration (sec) 02/08/2022 10  sec Final   • Estimated workload 02/08/2022 7.0  METS Final             No results found for: HGBA1C             Assessment and Plan    Diagnoses and all orders for this visit:    1. Cervical disc disorder at C6-C7 level with radiculopathy  -     traMADol (ULTRAM) 50 MG tablet; Take 2 tablets by mouth Every 6 (Six) Hours As Needed for Moderate Pain  or Severe Pain .  Dispense: 200 tablet; Refill: 1  -     oxyCODONE-acetaminophen (Percocet) 5-325 MG per tablet; Take 1 tablet by mouth Every Night. PRN severe pain  Dispense: 90 tablet; Refill: 0  -     gabapentin (NEURONTIN) 600 MG tablet; Take 1 tablet by mouth 2 (Two) Times a Day.   Dispense: 180 tablet; Refill: 1    2. Fibromyalgia  -     traMADol (ULTRAM) 50 MG tablet; Take 2 tablets by mouth Every 6 (Six) Hours As Needed for Moderate Pain  or Severe Pain .  Dispense: 200 tablet; Refill: 1  -     oxyCODONE-acetaminophen (Percocet) 5-325 MG per tablet; Take 1 tablet by mouth Every Night. PRN severe pain  Dispense: 90 tablet; Refill: 0    3. Neck pain  -     traMADol (ULTRAM) 50 MG tablet; Take 2 tablets by mouth Every 6 (Six) Hours As Needed for Moderate Pain  or Severe Pain .  Dispense: 200 tablet; Refill: 1    4. Chronic pain syndrome  -     traMADol (ULTRAM) 50 MG tablet; Take 2 tablets by mouth Every 6 (Six) Hours As Needed for Moderate Pain  or Severe Pain .  Dispense: 200 tablet; Refill: 1  -     oxyCODONE-acetaminophen (Percocet) 5-325 MG per tablet; Take 1 tablet by mouth Every Night. PRN severe pain  Dispense: 90 tablet; Refill: 0    5. Vitamin D deficiency  -     ergocalciferol (ERGOCALCIFEROL) 1.25 MG (51636 UT) capsule; Take 1 capsule by mouth 2 (Two) Times a Week.  Dispense: 24 capsule; Refill: 3      Chronic pain from chronic low back pain and fibromyalgia.  She has been taking a combination of tramadol for daytime use and Percocet for nighttime use for many years after several years of previous treatments being less effective.  Inspect report has been reviewed, and discrepancy described as above  Pain inventory has been completed by patient and has been reviewed by myself. COMM has been completed and is low risk.  She has signed a dated pain contract and drug screen is being completed today  Renewing medication as per current use.    Medications Discontinued During This Encounter   Medication Reason   • traMADol (ULTRAM) 50 MG tablet Reorder   • oxyCODONE-acetaminophen (Percocet) 5-325 MG per tablet Reorder   • ergocalciferol (ERGOCALCIFEROL) 1.25 MG (21030 UT) capsule Reorder         Follow Up     Return in about 3 months (around 11/10/2022) for pain managment and in Feb for  annual exam with labs, .    Patient was given instructions and counseling regarding her condition or for health maintenance advice. Please see specific information pulled into the AVS if appropriate.

## 2022-08-10 ENCOUNTER — OFFICE VISIT (OUTPATIENT)
Dept: FAMILY MEDICINE CLINIC | Facility: CLINIC | Age: 52
End: 2022-08-10

## 2022-08-10 VITALS
RESPIRATION RATE: 20 BRPM | SYSTOLIC BLOOD PRESSURE: 110 MMHG | TEMPERATURE: 98 F | WEIGHT: 211.6 LBS | BODY MASS INDEX: 32.07 KG/M2 | HEIGHT: 68 IN | OXYGEN SATURATION: 99 % | HEART RATE: 86 BPM | DIASTOLIC BLOOD PRESSURE: 62 MMHG

## 2022-08-10 DIAGNOSIS — M50.123 CERVICAL DISC DISORDER AT C6-C7 LEVEL WITH RADICULOPATHY: ICD-10-CM

## 2022-08-10 DIAGNOSIS — G89.4 CHRONIC PAIN SYNDROME: ICD-10-CM

## 2022-08-10 DIAGNOSIS — M79.7 FIBROMYALGIA: ICD-10-CM

## 2022-08-10 DIAGNOSIS — E55.9 VITAMIN D DEFICIENCY: ICD-10-CM

## 2022-08-10 DIAGNOSIS — M54.2 NECK PAIN: ICD-10-CM

## 2022-08-10 PROCEDURE — 99214 OFFICE O/P EST MOD 30 MIN: CPT | Performed by: FAMILY MEDICINE

## 2022-08-10 RX ORDER — OXYCODONE HYDROCHLORIDE AND ACETAMINOPHEN 5; 325 MG/1; MG/1
1 TABLET ORAL NIGHTLY
Qty: 90 TABLET | Refills: 0 | Status: SHIPPED | OUTPATIENT
Start: 2022-08-10 | End: 2022-12-13 | Stop reason: SDUPTHER

## 2022-08-10 RX ORDER — ERGOCALCIFEROL 1.25 MG/1
1 CAPSULE ORAL 2 TIMES WEEKLY
Qty: 24 CAPSULE | Refills: 3 | Status: SHIPPED | OUTPATIENT
Start: 2022-08-11

## 2022-08-10 RX ORDER — GABAPENTIN 600 MG/1
600 TABLET ORAL 2 TIMES DAILY
Qty: 180 TABLET | Refills: 1 | Status: SHIPPED | OUTPATIENT
Start: 2022-08-10 | End: 2022-09-30 | Stop reason: DRUGHIGH

## 2022-08-10 RX ORDER — GABAPENTIN 300 MG/1
600 CAPSULE ORAL 3 TIMES DAILY
Qty: 180 CAPSULE | Refills: 0 | Status: CANCELLED | OUTPATIENT
Start: 2022-08-10

## 2022-08-10 RX ORDER — TRAMADOL HYDROCHLORIDE 50 MG/1
100 TABLET ORAL EVERY 6 HOURS PRN
Qty: 200 TABLET | Refills: 1 | Status: SHIPPED | OUTPATIENT
Start: 2022-08-10 | End: 2022-10-17 | Stop reason: SDUPTHER

## 2022-08-12 ENCOUNTER — TELEPHONE (OUTPATIENT)
Dept: FAMILY MEDICINE CLINIC | Facility: CLINIC | Age: 52
End: 2022-08-12

## 2022-08-12 NOTE — TELEPHONE ENCOUNTER
Caller: Barbara Carter    Relationship: Self    Best call back number: [phone]    What's being requested: PRIOR AUTHORIZATION    When is it needed: ASAP    Additional information or concerns: PATIENT STATES HER INSURANCE IS REQUIRING PRIOR AUTHORIZATION FOR TWO MEDICATIONS:    traMADol (ULTRAM) 50 MG tablet  oxyCODONE-acetaminophen (Percocet) 5-325 MG per tablet      PHARMACY: [facility] [place]  [address] [facility] [phone] [place] [phone] FX  [phone]

## 2022-09-08 ENCOUNTER — OFFICE VISIT (OUTPATIENT)
Dept: FAMILY MEDICINE CLINIC | Facility: CLINIC | Age: 52
End: 2022-09-08

## 2022-09-08 VITALS
DIASTOLIC BLOOD PRESSURE: 80 MMHG | WEIGHT: 214.2 LBS | SYSTOLIC BLOOD PRESSURE: 124 MMHG | OXYGEN SATURATION: 98 % | HEART RATE: 87 BPM | TEMPERATURE: 97.5 F | BODY MASS INDEX: 32.46 KG/M2 | RESPIRATION RATE: 18 BRPM | HEIGHT: 68 IN

## 2022-09-08 DIAGNOSIS — Q87.19 SJOGREN-LARSSON SYNDROME: ICD-10-CM

## 2022-09-08 DIAGNOSIS — R52 COMPLAINTS OF TOTAL BODY PAIN: Primary | ICD-10-CM

## 2022-09-08 DIAGNOSIS — E66.1 CLASS 1 DRUG-INDUCED OBESITY WITH SERIOUS COMORBIDITY AND BODY MASS INDEX (BMI) OF 32.0 TO 32.9 IN ADULT: ICD-10-CM

## 2022-09-08 DIAGNOSIS — M25.50 POLYARTHRALGIA: ICD-10-CM

## 2022-09-08 DIAGNOSIS — M79.10 MYALGIA: ICD-10-CM

## 2022-09-08 DIAGNOSIS — M35.00 SJOGREN'S SYNDROME, WITH UNSPECIFIED ORGAN INVOLVEMENT: ICD-10-CM

## 2022-09-08 PROBLEM — E66.811 CLASS 1 DRUG-INDUCED OBESITY WITH BODY MASS INDEX (BMI) OF 32.0 TO 32.9 IN ADULT: Status: ACTIVE | Noted: 2022-09-08

## 2022-09-08 PROCEDURE — 99214 OFFICE O/P EST MOD 30 MIN: CPT | Performed by: FAMILY MEDICINE

## 2022-09-08 RX ORDER — PREDNISONE 20 MG/1
20 TABLET ORAL DAILY
Qty: 20 TABLET | Refills: 0 | Status: SHIPPED | OUTPATIENT
Start: 2022-09-08 | End: 2022-09-30

## 2022-09-08 NOTE — PROGRESS NOTES
Subjective   Barbara Carter is a 51 y.o. female. Presents to [facility]    Chief Complaint   Patient presents with   • Pain       Pain  This is a chronic problem. The current episode started 1 to 4 weeks ago. The problem occurs constantly. The problem has been gradually worsening. Associated symptoms include arthralgias and myalgias. Pertinent negatives include no joint swelling. The symptoms are aggravated by bending, twisting and walking. She has tried nothing for the symptoms.        I personally reviewed and updated the patient's allergies, medications, problem list, and past medical, surgical, social, and family history. I have reviewed and confirmed the accuracy of the History of Present Illness and Review of Symptoms as documented by the MA/LPN/RN. Isadora Ramirez MD    Allergies:  Allergies   Allergen Reactions   • Sulfacetamide Rash   • Butalbital-Apap-Caffeine Nausea Only   • Sulfa Antibiotics Unknown - High Severity   • Tricyclic Antidepressants Unknown - High Severity     need to be avoided due to risk of further Q-T prolongation       Social History:  Social History     Socioeconomic History   • Marital status:    Tobacco Use   • Smoking status: Never Smoker   • Smokeless tobacco: Never Used   Vaping Use   • Vaping Use: Never used   Substance and Sexual Activity   • Alcohol use: No   • Drug use: Never   • Sexual activity: Yes     Partners: Male     Birth control/protection: None     Comment:         Family History:  Family History   Problem Relation Age of Onset   • Thyroid disease Mother    • Diabetes Mother         DMII   • Heart failure Mother    • Hypertension Mother    • Stroke Father    • Colon cancer Paternal Grandfather        Past Medical History :  Patient Active Problem List   Diagnosis   • Cervical lymphadenopathy   • Fibromyalgia   • Chronic headache   • Gastroesophageal reflux disease   • Hyperlipidemia   • Hypothyroidism   • Polyarthralgia   • Prolonged QT  interval   • Sjogren's syndrome (HCC)   • Tricuspid valve regurgitation   • Vitamin D deficiency   • Lymphadenopathy   • Cervical disc disorder at C6-C7 level with radiculopathy   • Neck pain   • Chronic bilateral low back pain without sciatica   • Chronic pain syndrome   • Anxiety   • Other acute recurrent sinusitis   • Amenorrhea   • Allergic rhinitis   • Abnormal computed tomography scan   • Acute mesenteric adenitis   • Post-COVID-19 syndrome   • Nausea   • Mucositis   • Nasal burning   • Acute otalgia, left   • TMJ (sprain of temporomandibular joint)   • Dysuria   • Complaints of total body pain   • 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 Dissolve 1 packet in 30 oz of water up to 10x daily. Swish half of solution x 1 minute, spit, then repeat., Disp: , Rfl:   •  aspirin (aspirin) 81 MG EC tablet, Take 1 tablet by mouth Daily., Disp: , Rfl:   •  Carboxymethylcellul-Glycerin 0.5-0.9 % solution, Apply 1 drop to eye(s) as directed by provider As Needed., Disp: , Rfl:   •  cevimeline (Evoxac) 30 MG capsule, Take 1 capsule by mouth Every 8 (Eight) Hours., Disp: 90 capsule, Rfl: 1  •  clonazePAM (KlonoPIN) 0.5 MG tablet, Take 1 tablet by mouth Every 8 (Eight) Hours As Needed for Anxiety., Disp: 90 tablet, Rfl: 0  •  conjugated estrogens (Premarin) 0.625 MG/GM vaginal cream, Insert  into the vagina 2 (Two) Times a Week., Disp: 30 g, Rfl: 12  •  cyclobenzaprine (FLEXERIL) 10 MG tablet, Take 1 tablet by mouth At Night As Needed for Muscle Spasms., Disp: 30 tablet, Rfl: 0  •  DULoxetine (Cymbalta) 60 MG capsule, Take 1 capsule by mouth Daily., Disp: 90 capsule, Rfl: 5  •  ergocalciferol (ERGOCALCIFEROL) 1.25 MG (31068 UT) capsule, Take 1 capsule by mouth 2 (Two) Times a Week., Disp: 24 capsule, Rfl: 3  •   fluticasone (FLONASE) 50 MCG/ACT nasal spray, 2 sprays into the nostril(s) as directed by provider Daily., Disp: , Rfl:   •  gabapentin (NEURONTIN) 300 MG capsule, Take 2 capsules by mouth 3 (Three) Times a Day. For burning, Disp: 180 capsule, Rfl: 0  •  gabapentin (NEURONTIN) 600 MG tablet, Take 1 tablet by mouth 2 (Two) Times a Day., Disp: 180 tablet, Rfl: 1  •  hydroxychloroquine (PLAQUENIL) 200 MG tablet, Take 2 tablets by mouth Daily., Disp: , Rfl:   •  levothyroxine (Synthroid) 150 MCG tablet, Take 1 tablet by mouth Daily., Disp: 30 tablet, Rfl: 5  •  Lidocaine Viscous HCl (XYLOCAINE) 2 % solution, Take 10 mL by mouth As Needed for Moderate Pain . Dilute with salt water swish and spit up to 6 times a day, Disp: 100 mL, Rfl: 0  •  Multiple Vitamins-Minerals (CENTRUM ADULTS) tablet, Take 1 tablet by mouth Daily., Disp: , Rfl:   •  naloxone (NARCAN) 4 MG/0.1ML nasal spray, 1 spray into the nostril(s) as directed by provider As Needed (narcotic overdose)., Disp: 1 each, Rfl: 1  •  naproxen (Naprosyn) 500 MG tablet, Take 1 pill q 12 hrs with food as needed for Migraine, Disp: 60 tablet, Rfl: 3  •  omeprazole (priLOSEC) 40 MG capsule, Take 1 capsule by mouth Daily., Disp: 30 capsule, Rfl: 5  •  ondansetron (Zofran) 8 MG tablet, Take 1 tablet by mouth Every 12 (Twelve) Hours As Needed for Nausea or Vomiting., Disp: 30 tablet, Rfl: 3  •  oxyCODONE-acetaminophen (Percocet) 5-325 MG per tablet, Take 1 tablet by mouth Every Night. PRN severe pain, Disp: 90 tablet, Rfl: 0  •  promethazine (PHENERGAN) 25 MG tablet, 1-2 q6h prn, likely sedating, Disp: 20 tablet, Rfl: 5  •  pseudoephedrine (Sudafed 12 Hour) 120 MG 12 hr tablet, Take 1 tablet by mouth Every 12 (Twelve) Hours., Disp: 60 tablet, Rfl: 2  •  psyllium (Metamucil Smooth Texture) 58.6 % powder, 1 teaspoon daily, increase by 1 teaspoon every 3 days to a max of 2 tablespoons, Disp: 425 g, Rfl: 12  •  SUMAtriptan (Imitrex) 100 MG tablet, Take 1 at onset of Migraine,  may rpt in 2 hrs. Max 2 tab in 24h or 3 days/week, Disp: 18 tablet, Rfl: 11  •  traMADol (ULTRAM) 50 MG tablet, Take 2 tablets by mouth Every 6 (Six) Hours As Needed for Moderate Pain  or Severe Pain ., Disp: 200 tablet, Rfl: 1  •  clotrimazole (MYCELEX) 10 MG reji, Take 1 tablet by mouth 4 (Four) Times a Day., Disp: 40 tablet, Rfl: 0  •  predniSONE (DELTASONE) 20 MG tablet, Take 1 tablet by mouth Daily. TID x 3 days, BID x 3 days, QD x 3 days, 1/2 tab daily x 4 days, Disp: 20 tablet, Rfl: 0    Past Surgical History:  Past Surgical History:   Procedure Laterality Date   • APPENDECTOMY     •  SECTION     • CHOLECYSTECTOMY           Physical Exam:      Vital Signs:    Vitals:    22 1139   BP: 124/80   Pulse: 87   Resp: 18   Temp: 97.5 °F (36.4 °C)   SpO2: 98%        Wt Readings from Last 3 Encounters:   22 97.2 kg (214 lb 3.2 oz)   08/10/22 96 kg (211 lb 9.6 oz)   22 96.6 kg (213 lb)       Result Review :                Physical Exam  Vitals reviewed.   Constitutional:       Appearance: Normal appearance. She is well-developed.   HENT:      Head: Normocephalic and atraumatic.   Eyes:      General:         Right eye: No discharge.         Left eye: No discharge.   Cardiovascular:      Rate and Rhythm: Normal rate and regular rhythm.      Heart sounds: Normal heart sounds. No murmur heard.    No friction rub. No gallop.   Pulmonary:      Effort: Pulmonary effort is normal. No respiratory distress.      Breath sounds: Normal breath sounds. No wheezing or rales.   Musculoskeletal:         General: No swelling, tenderness or deformity.   Skin:     General: Skin is warm and dry.      Findings: No rash.   Neurological:      Mental Status: She is alert and oriented to person, place, and time.      Coordination: Coordination normal.      Gait: Gait normal.   Psychiatric:         Behavior: Behavior is cooperative.         Assessment and Plan:  Problems Addressed this Visit        Chromosomal and  Congenital     Sjogren-Korey syndrome     She thinks she is having flare.   She sees Dr Parada and Brenda Navarrete.            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 Weight is improving with lifestyle modifications. BMI is is above average; BMI management plan is completed. We discussed portion control and increasing exercise.             Multi-system (Lupus, Sarcoid...)    Sjogren's syndrome (HCC)     Worse  Check labs and start steroids    Discussed risks of steroids: hyperglycemia, osteoporosis, avascular necrosis, anxiety, insomnia and cataracts. Patient states understanding           Relevant Medications    predniSONE (DELTASONE) 20 MG tablet       Musculoskeletal and Injuries    Polyarthralgia     May be her autoimmune disorder flaring.   Will check labs and start steroids    Discussed risks of steroids: hyperglycemia, osteoporosis, avascular necrosis, anxiety, insomnia and cataracts. Patient states understanding           Relevant Medications    predniSONE (DELTASONE) 20 MG tablet    Other Relevant Orders    C-reactive Protein (Completed)    Sedimentation Rate (Completed)    Myalgia    Relevant Medications    predniSONE (DELTASONE) 20 MG tablet    Other Relevant Orders    C-reactive Protein (Completed)    Sedimentation Rate (Completed)       Symptoms and Signs    Complaints of total body pain - Primary      Diagnoses       Codes Comments    Complaints of total body pain    -  Primary ICD-10-CM: R52  ICD-9-CM: 780.96     Class 1 drug-induced obesity with serious comorbidity and body mass index (BMI) of 32.0 to 32.9 in adult     ICD-10-CM: E66.1, Z68.32  ICD-9-CM: 278.00, V85.32     Sjogren-Korey syndrome     ICD-10-CM: Q87.19  ICD-9-CM: 757.1     Myalgia     ICD-10-CM: M79.10  ICD-9-CM: 729.1      Polyarthralgia     ICD-10-CM: M25.50  ICD-9-CM: 719.49     Sjogren's syndrome, with unspecified organ involvement (HCC)     ICD-10-CM: M35.00  ICD-9-CM: 710.2            BMI is >= 30 and <35. (Class 1 Obesity). The following options were offered after discussion;: weight loss educational material (shared in after visit summary), exercise counseling/recommendations and nutrition counseling/recommendations      An After Visit Summary and PPPS were given to the patient.       I wore protective equipment throughout this patient encounter to include mask and eyewear. Hand hygiene was performed before donning protective equipment and after removal when leaving the room.

## 2022-09-08 NOTE — ASSESSMENT & PLAN NOTE
Patient's (Body mass index is 32.57 kg/m².) indicates that they are obese (BMI >30) with health conditions that include dyslipidemias . Weight is improving with lifestyle modifications. BMI is is above average; BMI management plan is completed. We discussed portion control and increasing exercise.

## 2022-09-09 ENCOUNTER — TELEPHONE (OUTPATIENT)
Dept: FAMILY MEDICINE CLINIC | Facility: CLINIC | Age: 52
End: 2022-09-09

## 2022-09-09 DIAGNOSIS — B37.0 THRUSH: Primary | ICD-10-CM

## 2022-09-09 LAB
CRP SERPL-MCNC: 10 MG/L (ref 0–10)
ERYTHROCYTE [SEDIMENTATION RATE] IN BLOOD BY WESTERGREN METHOD: 3 MM/HR (ref 0–40)

## 2022-09-09 RX ORDER — CLOTRIMAZOLE 10 MG/1
10 LOZENGE ORAL; TOPICAL 4 TIMES DAILY
Qty: 40 TABLET | Refills: 0 | Status: SHIPPED | OUTPATIENT
Start: 2022-09-09

## 2022-09-09 NOTE — TELEPHONE ENCOUNTER
Patient seen by Dr. Ramirez yesterday seeing if Dr. Ramirez would advise since Dr. Griffin is out of office.  Patient called and stated that with her auto immune disease that she gets dry tongue. She was wanting something to help with this as she has had it prior. She does have a follow up with Dr. Griffin once she finishes the steroids.

## 2022-09-09 NOTE — TELEPHONE ENCOUNTER
Caller: Barbara Carter    Relationship: Self    Best call back number: [phone]    What medication are you requesting: SOMETHING FOR TONGUE BEING DRY IS'S SPLITTING FROM THE DRYNESS     How long have you been experiencing symptoms:STARTED LAST NIGHT     If a prescription is needed, what is your preferred pharmacy and phone number[place] [facility] [place] [address] [phone] [facility] [phone]      Additional notes:  STATES HER MOUTH IS DRY AND HER TONGUE HAS SPLITS IN [place] THATS ARE HURTING AND STATES SHE HAS A COATING ON HER TONGUE

## 2022-09-09 NOTE — TELEPHONE ENCOUNTER
There are mouth washes for dry mouth over the counter like Biotene Dry Mouth Oral Rinse or ACT Total Care Dry Mouth Mouthwash  I sent medication for thrush to the pharmacy listed by the hub

## 2022-09-15 NOTE — ASSESSMENT & PLAN NOTE
May be her autoimmune disorder flaring.   Will check labs and start steroids    Discussed risks of steroids: hyperglycemia, osteoporosis, avascular necrosis, anxiety, insomnia and cataracts. Patient states understanding

## 2022-09-15 NOTE — ASSESSMENT & PLAN NOTE
Worse  Check labs and start steroids    Discussed risks of steroids: hyperglycemia, osteoporosis, avascular necrosis, anxiety, insomnia and cataracts. Patient states understanding

## 2022-09-30 ENCOUNTER — OFFICE VISIT (OUTPATIENT)
Dept: FAMILY MEDICINE CLINIC | Facility: CLINIC | Age: 52
End: 2022-09-30

## 2022-09-30 VITALS
RESPIRATION RATE: 18 BRPM | WEIGHT: 212 LBS | BODY MASS INDEX: 32.13 KG/M2 | HEART RATE: 102 BPM | TEMPERATURE: 98 F | HEIGHT: 68 IN | OXYGEN SATURATION: 96 % | SYSTOLIC BLOOD PRESSURE: 120 MMHG | DIASTOLIC BLOOD PRESSURE: 68 MMHG

## 2022-09-30 DIAGNOSIS — M25.50 POLYARTHRALGIA: ICD-10-CM

## 2022-09-30 DIAGNOSIS — M25.562 ACUTE PAIN OF LEFT KNEE: ICD-10-CM

## 2022-09-30 DIAGNOSIS — M35.00 SJOGREN'S SYNDROME, WITH UNSPECIFIED ORGAN INVOLVEMENT: ICD-10-CM

## 2022-09-30 DIAGNOSIS — M50.123 CERVICAL DISC DISORDER AT C6-C7 LEVEL WITH RADICULOPATHY: ICD-10-CM

## 2022-09-30 DIAGNOSIS — M79.7 FIBROMYALGIA: Primary | ICD-10-CM

## 2022-09-30 DIAGNOSIS — E66.1 CLASS 1 DRUG-INDUCED OBESITY WITH SERIOUS COMORBIDITY AND BODY MASS INDEX (BMI) OF 32.0 TO 32.9 IN ADULT: ICD-10-CM

## 2022-09-30 PROCEDURE — 99214 OFFICE O/P EST MOD 30 MIN: CPT | Performed by: FAMILY MEDICINE

## 2022-09-30 RX ORDER — GABAPENTIN 300 MG/1
600 CAPSULE ORAL 3 TIMES DAILY
Qty: 180 CAPSULE | Refills: 1 | Status: SHIPPED | OUTPATIENT
Start: 2022-09-30 | End: 2022-12-13 | Stop reason: SDUPTHER

## 2022-09-30 NOTE — PROGRESS NOTES
Chief Complaint  Generalized Body Aches     History of Present Illness    Barbara Carter presents today for all over body pain.  Patient completed steroids given by Dr. Ramirez. Week prior had gone to the [facility] and had negative flu and covid testing. Prescribed 6 day rapid taper of steroids. 2 days after completing felt 10 times worse. Second course of steroids, prednisone with slkower taper did help but pain worse again.  She reports the pain is exhausting.    Chest reports in August she accidentally started taking 1200 mg of gabapentin twice daily.  She had previously taken to 300 mg tablets twice daily refill was for 600 mg 1 twice daily she did not recognize the dosing had changed and continue to take 2 tablets per dose.  1 week ago she reduced back to 600 mg twice daily.  She denies any significant sedation however this was when she was taking prednisone.  She is wondering now if she is having some withdrawal from reducing the dose suddenly at the direction of her pharmacist.    She is complaining of new Left knee pain.  Knee Pain present for 2 weeks, started while walking almost like something snapped. Worse in back has moved around to the front as well. A little swollen. Seems to be worsening not improving.         Generalized body pain is a chronic problem. The current episode started 7 weeks ago.   The problem occurs constantly. The steroid does pack did help with the pain.   No joint swelling.   The symptoms are aggravated by bending, twisting and walking.    Have not seen rheumatologist,, Nadine Wait,  Recently        Current Outpatient Medications on File Prior to Visit   Medication Sig   • Artificial Saliva (SALIVAMAX) pack Take 1 packet by mouth Daily. Dissolve 1 packet in 30 oz of water up to 10x daily. Swish half of solution x 1 minute, spit, then repeat.   • aspirin (aspirin) 81 MG EC tablet Take 1 tablet by mouth Daily.   • Carboxymethylcellul-Glycerin 0.5-0.9 % solution Apply 1 drop to  eye(s) as directed by provider As Needed.   • cevimeline (Evoxac) 30 MG capsule Take 1 capsule by mouth Every 8 (Eight) Hours.   • clonazePAM (KlonoPIN) 0.5 MG tablet Take 1 tablet by mouth Every 8 (Eight) Hours As Needed for Anxiety.   • clotrimazole (MYCELEX) 10 MG reji Take 1 tablet by mouth 4 (Four) Times a Day.   • conjugated estrogens (Premarin) 0.625 MG/GM vaginal cream Insert  into the vagina 2 (Two) Times a Week.   • cyclobenzaprine (FLEXERIL) 10 MG tablet Take 1 tablet by mouth At Night As Needed for Muscle Spasms.   • DULoxetine (Cymbalta) 60 MG capsule Take 1 capsule by mouth Daily.   • ergocalciferol (ERGOCALCIFEROL) 1.25 MG (62003 UT) capsule Take 1 capsule by mouth 2 (Two) Times a Week.   • fluticasone (FLONASE) 50 MCG/ACT nasal spray 2 sprays into the nostril(s) as directed by provider Daily.   • hydroxychloroquine (PLAQUENIL) 200 MG tablet Take 2 tablets by mouth Daily.   • levothyroxine (Synthroid) 150 MCG tablet Take 1 tablet by mouth Daily.   • Lidocaine Viscous HCl (XYLOCAINE) 2 % solution Take 10 mL by mouth As Needed for Moderate Pain . Dilute with salt water swish and spit up to 6 times a day   • Multiple Vitamins-Minerals (CENTRUM ADULTS) tablet Take 1 tablet by mouth Daily.   • naloxone (NARCAN) 4 MG/0.1ML nasal spray 1 spray into the nostril(s) as directed by provider As Needed (narcotic overdose).   • omeprazole (priLOSEC) 40 MG capsule Take 1 capsule by mouth Daily.   • ondansetron (Zofran) 8 MG tablet Take 1 tablet by mouth Every 12 (Twelve) Hours As Needed for Nausea or Vomiting.   • oxyCODONE-acetaminophen (Percocet) 5-325 MG per tablet Take 1 tablet by mouth Every Night. PRN severe pain   • promethazine (PHENERGAN) 25 MG tablet 1-2 q6h prn, likely sedating   • psyllium (Metamucil Smooth Texture) 58.6 % powder 1 teaspoon daily, increase by 1 teaspoon every 3 days to a max of 2 tablespoons   • SUMAtriptan (Imitrex) 100 MG tablet Take 1 at onset of Migraine, may rpt in 2 hrs. Max 2  "tab in 24h or 3 days/week   • traMADol (ULTRAM) 50 MG tablet Take 2 tablets by mouth Every 6 (Six) Hours As Needed for Moderate Pain  or Severe Pain .   • [DISCONTINUED] gabapentin (NEURONTIN) 300 MG capsule Take 2 capsules by mouth 3 (Three) Times a Day. For burning   • [DISCONTINUED] gabapentin (NEURONTIN) 600 MG tablet Take 1 tablet by mouth 2 (Two) Times a Day.   • [DISCONTINUED] naproxen (Naprosyn) 500 MG tablet Take 1 pill q 12 hrs with food as needed for Migraine   • [DISCONTINUED] predniSONE (DELTASONE) 20 MG tablet Take 1 tablet by mouth Daily. TID x 3 days, BID x 3 days, QD x 3 days, 1/2 tab daily x 4 days   • [DISCONTINUED] pseudoephedrine (Sudafed 12 Hour) 120 MG 12 hr tablet Take 1 tablet by mouth Every 12 (Twelve) Hours.     No current facility-administered medications on file prior to visit.       Objective   Vital Signs:   /68 (BP Location: Right arm, Patient Position: Sitting, Cuff Size: Adult)   Pulse 102   Temp 98 °F (36.7 °C) (Temporal)   Resp 18   Ht 172.7 cm (68\")   Wt 96.2 kg (212 lb)   SpO2 96% Comment: room air  BMI 32.23 kg/m²       Physical Exam  Vitals and nursing note reviewed.   Constitutional:       General: She is not in acute distress.     Appearance: She is well-developed.   HENT:      Head: Normocephalic and atraumatic.   Cardiovascular:      Rate and Rhythm: Normal rate and regular rhythm.      Heart sounds: No murmur heard.  Pulmonary:      Effort: Pulmonary effort is normal.      Breath sounds: Normal breath sounds. No wheezing.   Musculoskeletal:         General: Normal range of motion.      Comments: Left knee with mild soft tissue swelling, no palpable effusion there is some mild laxity to valgus and varus strain.  There is a fullness posteriorly consistent with a possible Baker's cyst.  There are no palpable cords.  Negative Homans' sign.   Skin:     General: Skin is warm and dry.      Findings: No rash.   Neurological:      Mental Status: She is alert and " oriented to person, place, and time.            No visits with results within 1 Day(s) from this visit.   Latest known visit with results is:   Office Visit on 09/08/2022   Component Date Value Ref Range Status   • C-Reactive Protein 09/08/2022 10  0 - 10 mg/L Final   • Sed Rate 09/08/2022 3  0 - 40 mm/hr Final             No results found for: HGBA1C             Assessment and Plan    Diagnoses and all orders for this visit:    1. Fibromyalgia (Primary)    2. Acute pain of left knee  -     XR Knee 1 or 2 View Left    3. Polyarthralgia    4. Sjogren's syndrome, with unspecified organ involvement (HCC)    5. Cervical disc disorder at C6-C7 level with radiculopathy  -     gabapentin (NEURONTIN) 300 MG capsule; Take 2 capsules by mouth 3 (Three) Times a Day. For pain  Dispense: 180 capsule; Refill: 1    6. Class 1 drug-induced obesity with serious comorbidity and body mass index (BMI) of 32.0 to 32.9 in adult  Assessment & Plan:  Patient's (Body mass index is 32.23 kg/m².) indicates that they are obese (BMI >30) with health conditions that include dyslipidemias and GERD . Weight is unchanged. BMI is is above average; BMI management plan is completed. We discussed portion control and increasing exercise.       Acute on chronic pain with history of fibromyalgia and Sjogren's syndrome as well as cervical disc disease who left knee pain.  X-ray ordered for me may need referral to.  Advise she can increase gabapentin 600 mg to 3 times daily new prescription given.      Medications Discontinued During This Encounter   Medication Reason   • naproxen (Naprosyn) 500 MG tablet *Therapy completed   • predniSONE (DELTASONE) 20 MG tablet *Therapy completed   • pseudoephedrine (Sudafed 12 Hour) 120 MG 12 hr tablet *Therapy completed   • gabapentin (NEURONTIN) 600 MG tablet Dose adjustment   • gabapentin (NEURONTIN) 300 MG capsule Reorder         Follow Up     No follow-ups on file.    Patient was given instructions and counseling  regarding her condition or for health maintenance advice. Please see specific information pulled into the AVS if appropriate.

## 2022-09-30 NOTE — ASSESSMENT & PLAN NOTE
Patient's (Body mass index is 32.23 kg/m².) indicates that they are obese (BMI >30) with health conditions that include dyslipidemias and GERD . Weight is unchanged. BMI is is above average; BMI management plan is completed. We discussed portion control and increasing exercise.

## 2022-10-05 ENCOUNTER — HOSPITAL ENCOUNTER (OUTPATIENT)
Dept: GENERAL RADIOLOGY | Facility: HOSPITAL | Age: 52
Discharge: HOME | End: 2022-10-05
Admitting: FAMILY MEDICINE

## 2022-10-05 PROCEDURE — 73560 X-RAY EXAM OF KNEE 1 OR 2: CPT

## 2022-10-11 NOTE — TELEPHONE ENCOUNTER
Pt is requesting a refill on her clonazepam 0.5 MG and Gabapentin 300 MG.     Her last office visit was on 1/24/22    Please advise.    Clindamycin Counseling: I counseled the patient regarding use of clindamycin as an antibiotic for prophylactic and/or therapeutic purposes. Clindamycin is active against numerous classes of bacteria, including skin bacteria. Side effects may include nausea, diarrhea, gastrointestinal upset, rash, hives, yeast infections, and in rare cases, colitis.

## 2022-10-17 DIAGNOSIS — K21.9 GASTROESOPHAGEAL REFLUX DISEASE, UNSPECIFIED WHETHER ESOPHAGITIS PRESENT: ICD-10-CM

## 2022-10-17 DIAGNOSIS — M50.123 CERVICAL DISC DISORDER AT C6-C7 LEVEL WITH RADICULOPATHY: ICD-10-CM

## 2022-10-17 DIAGNOSIS — M79.7 FIBROMYALGIA: ICD-10-CM

## 2022-10-17 DIAGNOSIS — G89.4 CHRONIC PAIN SYNDROME: ICD-10-CM

## 2022-10-17 DIAGNOSIS — E03.9 ACQUIRED HYPOTHYROIDISM: ICD-10-CM

## 2022-10-17 DIAGNOSIS — M54.2 NECK PAIN: ICD-10-CM

## 2022-10-17 RX ORDER — TRAMADOL HYDROCHLORIDE 50 MG/1
100 TABLET ORAL EVERY 6 HOURS PRN
Qty: 200 TABLET | Refills: 1 | Status: SHIPPED | OUTPATIENT
Start: 2022-10-17 | End: 2022-12-13 | Stop reason: SDUPTHER

## 2022-10-17 RX ORDER — OMEPRAZOLE 40 MG/1
40 CAPSULE, DELAYED RELEASE ORAL DAILY
Qty: 30 CAPSULE | Refills: 5 | Status: SHIPPED | OUTPATIENT
Start: 2022-10-17

## 2022-10-17 RX ORDER — LEVOTHYROXINE SODIUM 0.15 MG/1
150 TABLET ORAL EVERY 24 HOURS
Qty: 30 TABLET | Refills: 5 | Status: SHIPPED | OUTPATIENT
Start: 2022-10-17 | End: 2023-01-17

## 2022-10-17 NOTE — TELEPHONE ENCOUNTER
Caller: Barbara Carter EMIR    Relationship: Self    Best call back number: [phone]    Requested Prescriptions:   Requested Prescriptions     Pending Prescriptions Disp Refills   • levothyroxine (Synthroid) 150 MCG tablet 30 tablet 5     Sig: Take 1 tablet by mouth Daily.   • traMADol (ULTRAM) 50 MG tablet 200 tablet 1     Sig: Take 2 tablets by mouth Every 6 (Six) Hours As Needed for Moderate Pain or Severe Pain.   • omeprazole (priLOSEC) 40 MG capsule 30 capsule 5     Sig: Take 1 capsule by mouth Daily.        [place]arm[place] where request should be sent: [facility] - [address] [facility] [phone] [place] [phone]      Additional details provided by patient: LESS THAN 3 DAY SUPPLY    Does the patient have less than a 3 day supply:  [x] Yes  [] No    Harish Stuart Rep   10/17/22 14:30 EDT

## 2022-12-13 ENCOUNTER — OFFICE VISIT (OUTPATIENT)
Dept: FAMILY MEDICINE CLINIC | Facility: CLINIC | Age: 52
End: 2022-12-13

## 2022-12-13 VITALS
SYSTOLIC BLOOD PRESSURE: 112 MMHG | TEMPERATURE: 98.7 F | WEIGHT: 213.4 LBS | RESPIRATION RATE: 16 BRPM | BODY MASS INDEX: 32.34 KG/M2 | DIASTOLIC BLOOD PRESSURE: 64 MMHG | HEIGHT: 68 IN | HEART RATE: 87 BPM | OXYGEN SATURATION: 100 %

## 2022-12-13 DIAGNOSIS — E66.1 CLASS 1 DRUG-INDUCED OBESITY WITH SERIOUS COMORBIDITY AND BODY MASS INDEX (BMI) OF 32.0 TO 32.9 IN ADULT: ICD-10-CM

## 2022-12-13 DIAGNOSIS — F41.9 ANXIETY: ICD-10-CM

## 2022-12-13 DIAGNOSIS — E78.2 MIXED HYPERLIPIDEMIA: ICD-10-CM

## 2022-12-13 DIAGNOSIS — F34.1 PERSISTENT DEPRESSIVE DISORDER: ICD-10-CM

## 2022-12-13 DIAGNOSIS — Z78.0 MENOPAUSE: ICD-10-CM

## 2022-12-13 DIAGNOSIS — R53.83 OTHER FATIGUE: ICD-10-CM

## 2022-12-13 DIAGNOSIS — E55.9 VITAMIN D DEFICIENCY: ICD-10-CM

## 2022-12-13 DIAGNOSIS — M54.2 NECK PAIN: ICD-10-CM

## 2022-12-13 DIAGNOSIS — M79.7 FIBROMYALGIA: ICD-10-CM

## 2022-12-13 DIAGNOSIS — M50.123 CERVICAL DISC DISORDER AT C6-C7 LEVEL WITH RADICULOPATHY: Primary | ICD-10-CM

## 2022-12-13 DIAGNOSIS — G89.4 CHRONIC PAIN SYNDROME: ICD-10-CM

## 2022-12-13 DIAGNOSIS — E03.9 ACQUIRED HYPOTHYROIDISM: ICD-10-CM

## 2022-12-13 PROCEDURE — 99214 OFFICE O/P EST MOD 30 MIN: CPT | Performed by: FAMILY MEDICINE

## 2022-12-13 RX ORDER — OXYCODONE HYDROCHLORIDE AND ACETAMINOPHEN 5; 325 MG/1; MG/1
1 TABLET ORAL NIGHTLY
Qty: 90 TABLET | Refills: 0 | Status: SHIPPED | OUTPATIENT
Start: 2022-12-13

## 2022-12-13 RX ORDER — GABAPENTIN 300 MG/1
600 CAPSULE ORAL 2 TIMES DAILY
Qty: 360 CAPSULE | Refills: 2 | Status: SHIPPED | OUTPATIENT
Start: 2022-12-13

## 2022-12-13 RX ORDER — ARIPIPRAZOLE 2 MG/1
2 TABLET ORAL DAILY
Qty: 60 TABLET | Refills: 1 | Status: SHIPPED | OUTPATIENT
Start: 2022-12-13 | End: 2023-02-20

## 2022-12-13 RX ORDER — CLONAZEPAM 0.5 MG/1
0.5 TABLET ORAL 2 TIMES DAILY PRN
Qty: 180 TABLET | Refills: 0 | Status: SHIPPED | OUTPATIENT
Start: 2022-12-13

## 2022-12-13 RX ORDER — TRAMADOL HYDROCHLORIDE 50 MG/1
100 TABLET ORAL EVERY 8 HOURS PRN
Qty: 270 TABLET | Refills: 0 | Status: SHIPPED | OUTPATIENT
Start: 2022-12-13

## 2022-12-13 NOTE — PROGRESS NOTES
Chief Complaint  Pain management, Cervical Disc Disorder, Fibromyalgia, and Anxiety    History of Present Illness  Barbara Carter presents today for a pain management visit.  Patient states medications are working ok.   Aggravating factors include minor exertion, sitting, standing, and bending.  Pain scale today is 3.   Inspect ran today.  Patient pain contract is up to date from 8/10/2022.  Last UDS done on 8/10/2022.      Do take percocet every night and tramadol 2 tablets once most days and occasionally twice  Patient states she is feeling more depressed and anxious on edge frequently. She does take one Klonipin every night and in past month or longer take at least 1 or 2 in the day daily. Continuing Cymbalta for years     Associated symptoms include: depressed mood, excessive worry, fatigue, insomnia, irritability, and nervousness/anxiety.   Uncertain if pain is trigger.  Less stess, quit work take care of grandchildren, daughter currently on maternity leave.     Current Outpatient Medications on File Prior to Visit   Medication Sig   • Artificial Saliva (SALIVAMAX) pack Take 1 packet by mouth Daily. Dissolve 1 packet in 30 oz of water up to 10x daily. Swish half of solution x 1 minute, spit, then repeat.   • aspirin (aspirin) 81 MG EC tablet Take 1 tablet by mouth Daily.   • Carboxymethylcellul-Glycerin 0.5-0.9 % solution Apply 1 drop to eye(s) as directed by provider As Needed.   • cevimeline (Evoxac) 30 MG capsule Take 1 capsule by mouth Every 8 (Eight) Hours.   • clotrimazole (MYCELEX) 10 MG reji Take 1 tablet by mouth 4 (Four) Times a Day.   • conjugated estrogens (Premarin) 0.625 MG/GM vaginal cream Insert  into the vagina 2 (Two) Times a Week.   • cyclobenzaprine (FLEXERIL) 10 MG tablet Take 1 tablet by mouth At Night As Needed for Muscle Spasms.   • DULoxetine (Cymbalta) 60 MG capsule Take 1 capsule by mouth Daily.   • ergocalciferol (ERGOCALCIFEROL) 1.25 MG (30300 UT) capsule Take 1 capsule by mouth 2  "(Two) Times a Week.   • fluticasone (FLONASE) 50 MCG/ACT nasal spray 2 sprays into the nostril(s) as directed by provider Daily.   • hydroxychloroquine (PLAQUENIL) 200 MG tablet Take 2 tablets by mouth Daily.   • levothyroxine (Synthroid) 150 MCG tablet Take 1 tablet by mouth Daily.   • Lidocaine Viscous HCl (XYLOCAINE) 2 % solution Take 10 mL by mouth As Needed for Moderate Pain . Dilute with salt water swish and spit up to 6 times a day   • Multiple Vitamins-Minerals (CENTRUM ADULTS) tablet Take 1 tablet by mouth Daily.   • naloxone (NARCAN) 4 MG/0.1ML nasal spray 1 spray into the nostril(s) as directed by provider As Needed (narcotic overdose).   • omeprazole (priLOSEC) 40 MG capsule Take 1 capsule by mouth Daily.   • ondansetron (Zofran) 8 MG tablet Take 1 tablet by mouth Every 12 (Twelve) Hours As Needed for Nausea or Vomiting.   • promethazine (PHENERGAN) 25 MG tablet 1-2 q6h prn, likely sedating   • psyllium (Metamucil Smooth Texture) 58.6 % powder 1 teaspoon daily, increase by 1 teaspoon every 3 days to a max of 2 tablespoons   • SUMAtriptan (Imitrex) 100 MG tablet Take 1 at onset of Migraine, may rpt in 2 hrs. Max 2 tab in 24h or 3 days/week   • [DISCONTINUED] clonazePAM (KlonoPIN) 0.5 MG tablet Take 1 tablet by mouth Every 8 (Eight) Hours As Needed for Anxiety.   • [DISCONTINUED] gabapentin (NEURONTIN) 300 MG capsule Take 2 capsules by mouth 3 (Three) Times a Day. For pain   • [DISCONTINUED] oxyCODONE-acetaminophen (Percocet) 5-325 MG per tablet Take 1 tablet by mouth Every Night. PRN severe pain   • [DISCONTINUED] traMADol (ULTRAM) 50 MG tablet Take 2 tablets by mouth Every 6 (Six) Hours As Needed for Moderate Pain or Severe Pain.     No current facility-administered medications on file prior to visit.       Objective   Vital Signs:   /64 (BP Location: Right arm, Patient Position: Sitting, Cuff Size: Adult)   Pulse 87   Temp 98.7 °F (37.1 °C) (Temporal)   Resp 16   Ht 172.7 cm (68\")   Wt 96.8 " kg (213 lb 6.4 oz)   SpO2 100%   BMI 32.45 kg/m²       Physical Exam  Vitals and nursing note reviewed.   Constitutional:       General: She is not in acute distress.     Appearance: She is well-developed. She is obese. She is not ill-appearing.   HENT:      Head: Normocephalic and atraumatic.   Cardiovascular:      Rate and Rhythm: Normal rate and regular rhythm.      Heart sounds: No murmur heard.  Pulmonary:      Effort: Pulmonary effort is normal.      Breath sounds: Normal breath sounds. No wheezing.   Musculoskeletal:         General: Normal range of motion.   Skin:     General: Skin is warm and dry.      Findings: No rash.   Neurological:      Mental Status: She is alert and oriented to person, place, and time.   Psychiatric:         Mood and Affect: Mood normal.         Thought Content: Thought content normal.      Comments: Somewhat dysthymic            No visits with results within 1 Day(s) from this visit.   Latest known visit with results is:   Office Visit on 09/08/2022   Component Date Value Ref Range Status   • C-Reactive Protein 09/08/2022 10  0 - 10 mg/L Final   • Sed Rate 09/08/2022 3  0 - 40 mm/hr Final       Lab Results   Component Value Date    CHLPL 205 (H) 01/03/2022    TRIG 339 (H) 01/03/2022    HDL 36 (L) 01/03/2022     (H) 01/03/2022     Lab Results   Component Value Date    TSH 1.770 01/03/2022     Lab Results   Component Value Date    GLUCOSE 92 01/03/2022    BUN 9 01/03/2022    CREATININE 0.86 01/03/2022    EGFRIFNONA 78 01/03/2022    EGFRIFAFRI 90 01/03/2022    BCR 10 01/03/2022    K 4.2 01/03/2022    CO2 24 01/03/2022    CALCIUM 10.0 01/03/2022    PROTENTOTREF 7.7 01/03/2022    ALBUMIN 4.7 01/03/2022    LABIL2 1.6 01/03/2022    AST 19 01/03/2022    ALT 17 01/03/2022     Lab Results   Component Value Date    WBC 3.7 01/03/2022    HGB 14.9 01/03/2022    HCT 42.7 01/03/2022    MCV 91 01/03/2022     01/03/2022                      Assessment and Plan    Diagnoses and all  orders for this visit:    1. Cervical disc disorder at C6-C7 level with radiculopathy (Primary)  -     oxyCODONE-acetaminophen (Percocet) 5-325 MG per tablet; Take 1 tablet by mouth Every Night. PRN severe pain  Dispense: 90 tablet; Refill: 0  -     gabapentin (NEURONTIN) 300 MG capsule; Take 2 capsules by mouth 2 (Two) Times a Day. For pain  Dispense: 360 capsule; Refill: 2  -     traMADol (ULTRAM) 50 MG tablet; Take 2 tablets by mouth Every 8 (Eight) Hours As Needed for Moderate Pain or Severe Pain.  Dispense: 270 tablet; Refill: 0    2. Fibromyalgia  -     oxyCODONE-acetaminophen (Percocet) 5-325 MG per tablet; Take 1 tablet by mouth Every Night. PRN severe pain  Dispense: 90 tablet; Refill: 0  -     traMADol (ULTRAM) 50 MG tablet; Take 2 tablets by mouth Every 8 (Eight) Hours As Needed for Moderate Pain or Severe Pain.  Dispense: 270 tablet; Refill: 0    3. Chronic pain syndrome  -     oxyCODONE-acetaminophen (Percocet) 5-325 MG per tablet; Take 1 tablet by mouth Every Night. PRN severe pain  Dispense: 90 tablet; Refill: 0  -     traMADol (ULTRAM) 50 MG tablet; Take 2 tablets by mouth Every 8 (Eight) Hours As Needed for Moderate Pain or Severe Pain.  Dispense: 270 tablet; Refill: 0    4. Class 1 drug-induced obesity with serious comorbidity and body mass index (BMI) of 32.0 to 32.9 in adult  Assessment & Plan:  Patient's (Body mass index is 32.45 kg/m².) indicates that they are obese (BMI >30) with health conditions that include GERD . Weight is unchanged. BMI is is above average; BMI management plan is completed. We discussed portion control and increasing exercise.       5. Anxiety  -     clonazePAM (KlonoPIN) 0.5 MG tablet; Take 1 tablet by mouth 2 (Two) Times a Day As Needed for Anxiety.  Dispense: 180 tablet; Refill: 0    6. Persistent depressive disorder  -     ARIPiprazole (Abilify) 2 MG tablet; Take 1 tablet by mouth Daily. In 1 week increase to 2 if needed for depression and anxiety  Dispense: 60  tablet; Refill: 1    7. Other fatigue  -     CBC & Differential  -     Vitamin B12    8. Mixed hyperlipidemia  -     Lipid Panel  -     Comprehensive Metabolic Panel  -     CBC & Differential    9. Vitamin D deficiency  -     Vitamin D,25-Hydroxy    10. Acquired hypothyroidism  -     TSH  -     T4, Free  -     T3, Free    11. Neck pain  -     traMADol (ULTRAM) 50 MG tablet; Take 2 tablets by mouth Every 8 (Eight) Hours As Needed for Moderate Pain or Severe Pain.  Dispense: 270 tablet; Refill: 0    12. Menopause  Comments:  Last menstrual period 2012  Cervical degenerative disc disease and fibromyalgia renewing Percocet and tramadol.  Depression with increased symptoms and increased anxiety.  Patient declines referral to counseling.  We will have her continue Cymbalta as this is also been helpful with her chronic pain and fibromyalgia but adding Abilify as augmentation treatment.  Advise she may continue to use Klonopin if needed but advised benzodiazepines are very addictive like her to limit to 2 daily.  Increased fatigue, likely due to depressive symptoms checking labs as ordered above.    Medications Discontinued During This Encounter   Medication Reason   • clonazePAM (KlonoPIN) 0.5 MG tablet Reorder   • oxyCODONE-acetaminophen (Percocet) 5-325 MG per tablet Reorder   • gabapentin (NEURONTIN) 300 MG capsule Reorder   • traMADol (ULTRAM) 50 MG tablet Reorder         Follow Up {Instructions Charge Capture  Follow-up Communications :23}    Return in about 4 weeks (around 1/10/2023) for depression.    Patient was given instructions and counseling regarding her condition or for health maintenance advice. Please see specific information pulled into the AVS if appropriate.

## 2022-12-13 NOTE — ASSESSMENT & PLAN NOTE
Patient's (Body mass index is 32.45 kg/m².) indicates that they are obese (BMI >30) with health conditions that include GERD . Weight is unchanged. BMI is is above average; BMI management plan is completed. We discussed portion control and increasing exercise.

## 2022-12-14 DIAGNOSIS — E53.8 B12 DEFICIENCY: Primary | ICD-10-CM

## 2022-12-14 DIAGNOSIS — E03.9 ACQUIRED HYPOTHYROIDISM: ICD-10-CM

## 2022-12-14 LAB
25(OH)D3+25(OH)D2 SERPL-MCNC: 61.3 NG/ML (ref 30–100)
ALBUMIN SERPL-MCNC: 4.7 G/DL (ref 3.8–4.9)
ALBUMIN/GLOB SERPL: 1.7 {RATIO} (ref 1.2–2.2)
ALP SERPL-CCNC: 83 IU/L (ref 44–121)
ALT SERPL-CCNC: 18 IU/L (ref 0–32)
AST SERPL-CCNC: 20 IU/L (ref 0–40)
BASOPHILS # BLD AUTO: 0 X10E3/UL (ref 0–0.2)
BASOPHILS NFR BLD AUTO: 1 %
BILIRUB SERPL-MCNC: 0.3 MG/DL (ref 0–1.2)
BUN SERPL-MCNC: 11 MG/DL (ref 6–24)
BUN/CREAT SERPL: 10 (ref 9–23)
CALCIUM SERPL-MCNC: 9.6 MG/DL (ref 8.7–10.2)
CHLORIDE SERPL-SCNC: 101 MMOL/L (ref 96–106)
CHOLEST SERPL-MCNC: 238 MG/DL (ref 100–199)
CO2 SERPL-SCNC: 26 MMOL/L (ref 20–29)
CREAT SERPL-MCNC: 1.12 MG/DL (ref 0.57–1)
EGFRCR SERPLBLD CKD-EPI 2021: 59 ML/MIN/1.73
EOSINOPHIL # BLD AUTO: 0.1 X10E3/UL (ref 0–0.4)
EOSINOPHIL NFR BLD AUTO: 2 %
ERYTHROCYTE [DISTWIDTH] IN BLOOD BY AUTOMATED COUNT: 12 % (ref 11.7–15.4)
GLOBULIN SER CALC-MCNC: 2.7 G/DL (ref 1.5–4.5)
GLUCOSE SERPL-MCNC: 101 MG/DL (ref 70–99)
HCT VFR BLD AUTO: 43.6 % (ref 34–46.6)
HDLC SERPL-MCNC: 47 MG/DL
HGB BLD-MCNC: 14.8 G/DL (ref 11.1–15.9)
IMM GRANULOCYTES # BLD AUTO: 0 X10E3/UL (ref 0–0.1)
IMM GRANULOCYTES NFR BLD AUTO: 0 %
LDLC SERPL CALC-MCNC: 155 MG/DL (ref 0–99)
LYMPHOCYTES # BLD AUTO: 1.6 X10E3/UL (ref 0.7–3.1)
LYMPHOCYTES NFR BLD AUTO: 31 %
MCH RBC QN AUTO: 31.2 PG (ref 26.6–33)
MCHC RBC AUTO-ENTMCNC: 33.9 G/DL (ref 31.5–35.7)
MCV RBC AUTO: 92 FL (ref 79–97)
MONOCYTES # BLD AUTO: 0.6 X10E3/UL (ref 0.1–0.9)
MONOCYTES NFR BLD AUTO: 11 %
NEUTROPHILS # BLD AUTO: 2.9 X10E3/UL (ref 1.4–7)
NEUTROPHILS NFR BLD AUTO: 55 %
PLATELET # BLD AUTO: 310 X10E3/UL (ref 150–450)
POTASSIUM SERPL-SCNC: 4.6 MMOL/L (ref 3.5–5.2)
PROT SERPL-MCNC: 7.4 G/DL (ref 6–8.5)
RBC # BLD AUTO: 4.75 X10E6/UL (ref 3.77–5.28)
SODIUM SERPL-SCNC: 142 MMOL/L (ref 134–144)
T3FREE SERPL-MCNC: 2.9 PG/ML (ref 2–4.4)
T4 FREE SERPL-MCNC: 1.27 NG/DL (ref 0.82–1.77)
TRIGL SERPL-MCNC: 198 MG/DL (ref 0–149)
TSH SERPL DL<=0.005 MIU/L-ACNC: 0.27 UIU/ML (ref 0.45–4.5)
VIT B12 SERPL-MCNC: 212 PG/ML (ref 232–1245)
VLDLC SERPL CALC-MCNC: 36 MG/DL (ref 5–40)
WBC # BLD AUTO: 5.2 X10E3/UL (ref 3.4–10.8)

## 2022-12-14 RX ORDER — LANOLIN ALCOHOL/MO/W.PET/CERES
1000 CREAM (GRAM) TOPICAL DAILY
Qty: 30 TABLET | Refills: 12 | Status: SHIPPED | OUTPATIENT
Start: 2022-12-14

## 2023-01-16 ENCOUNTER — OFFICE VISIT (OUTPATIENT)
Dept: FAMILY MEDICINE CLINIC | Facility: CLINIC | Age: 53
End: 2023-01-16
Payer: COMMERCIAL

## 2023-01-16 VITALS
BODY MASS INDEX: 32.51 KG/M2 | OXYGEN SATURATION: 99 % | WEIGHT: 214.5 LBS | HEART RATE: 100 BPM | HEIGHT: 68 IN | RESPIRATION RATE: 18 BRPM | SYSTOLIC BLOOD PRESSURE: 126 MMHG | TEMPERATURE: 97 F | DIASTOLIC BLOOD PRESSURE: 72 MMHG

## 2023-01-16 DIAGNOSIS — N64.89 BREAST ASYMMETRY ON EXAMINATION: ICD-10-CM

## 2023-01-16 DIAGNOSIS — E66.1 CLASS 1 DRUG-INDUCED OBESITY WITH SERIOUS COMORBIDITY AND BODY MASS INDEX (BMI) OF 32.0 TO 32.9 IN ADULT: Primary | ICD-10-CM

## 2023-01-16 DIAGNOSIS — Z12.31 ENCOUNTER FOR SCREENING MAMMOGRAM FOR BREAST CANCER: ICD-10-CM

## 2023-01-16 DIAGNOSIS — N64.3 GALACTORRHEA: ICD-10-CM

## 2023-01-16 DIAGNOSIS — E03.9 ACQUIRED HYPOTHYROIDISM: ICD-10-CM

## 2023-01-16 DIAGNOSIS — M35.00 SJOGREN'S SYNDROME, WITH UNSPECIFIED ORGAN INVOLVEMENT: ICD-10-CM

## 2023-01-16 DIAGNOSIS — E53.8 B12 DEFICIENCY: ICD-10-CM

## 2023-01-16 DIAGNOSIS — R59.1 LYMPHADENOPATHY: ICD-10-CM

## 2023-01-16 PROBLEM — N64.52 BREAST DISCHARGE: Status: ACTIVE | Noted: 2023-01-16

## 2023-01-16 PROCEDURE — 99214 OFFICE O/P EST MOD 30 MIN: CPT | Performed by: FAMILY MEDICINE

## 2023-01-16 NOTE — PROGRESS NOTES
"Chief Complaint  Breast Discharge    History of Present Illness  Barbara Carter presents today for recurrent small brownish breast discharge right greater than left. Occurs at least weekly .  Symptoms present for years  but changes started last 5 days.  Symptoms include discharge, tenderness, asymmetrical.   Biggest concern today is about a newly recognized asymetry and history of \"hot spots\" that had previously been followed by Dr. Mcgee.  Believe they were first identified in 2015 and have not had a follow-up CT or PET scan since at least 2018 when Dr. Mcgee retired.  She is past due for routine screening mammogram as well.    Patient had spontaneous menopause at the age of 42.    • PET scanning was performed, showing a very small lymph nodes with SUVs in the range of 1.7-2.5. She has seen a surgeon for possible attempt at axillary biopsy but no nodes suitable for biopsy were identified this had been thought to possibly be due to Sjogrens         Current Outpatient Medications on File Prior to Visit   Medication Sig   • ARIPiprazole (Abilify) 2 MG tablet Take 1 tablet by mouth Daily. In 1 week increase to 2 if needed for depression and anxiety   • Artificial Saliva (SALIVAMAX) pack Take 1 packet by mouth Daily. Dissolve 1 packet in 30 oz of water up to 10x daily. Swish half of solution x 1 minute, spit, then repeat.   • aspirin (aspirin) 81 MG EC tablet Take 1 tablet by mouth Daily.   • Carboxymethylcellul-Glycerin 0.5-0.9 % solution Apply 1 drop to eye(s) as directed by provider As Needed.   • cevimeline (Evoxac) 30 MG capsule Take 1 capsule by mouth Every 8 (Eight) Hours.   • clonazePAM (KlonoPIN) 0.5 MG tablet Take 1 tablet by mouth 2 (Two) Times a Day As Needed for Anxiety.   • clotrimazole (MYCELEX) 10 MG reji Take 1 tablet by mouth 4 (Four) Times a Day.   • conjugated estrogens (Premarin) 0.625 MG/GM vaginal cream Insert  into the vagina 2 (Two) Times a Week.   • cyclobenzaprine (FLEXERIL) 10 MG " tablet Take 1 tablet by mouth At Night As Needed for Muscle Spasms.   • DULoxetine (Cymbalta) 60 MG capsule Take 1 capsule by mouth Daily.   • ergocalciferol (ERGOCALCIFEROL) 1.25 MG (58352 UT) capsule Take 1 capsule by mouth 2 (Two) Times a Week.   • fluticasone (FLONASE) 50 MCG/ACT nasal spray 2 sprays into the nostril(s) as directed by provider Daily.   • gabapentin (NEURONTIN) 300 MG capsule Take 2 capsules by mouth 2 (Two) Times a Day. For pain   • hydroxychloroquine (PLAQUENIL) 200 MG tablet Take 2 tablets by mouth Daily.   • levothyroxine (Synthroid) 150 MCG tablet Take 1 tablet by mouth Daily.   • Lidocaine Viscous HCl (XYLOCAINE) 2 % solution Take 10 mL by mouth As Needed for Moderate Pain . Dilute with salt water swish and spit up to 6 times a day   • Multiple Vitamins-Minerals (CENTRUM ADULTS) tablet Take 1 tablet by mouth Daily.   • naloxone (NARCAN) 4 MG/0.1ML nasal spray 1 spray into the nostril(s) as directed by provider As Needed (narcotic overdose).   • omeprazole (priLOSEC) 40 MG capsule Take 1 capsule by mouth Daily.   • ondansetron (Zofran) 8 MG tablet Take 1 tablet by mouth Every 12 (Twelve) Hours As Needed for Nausea or Vomiting.   • oxyCODONE-acetaminophen (Percocet) 5-325 MG per tablet Take 1 tablet by mouth Every Night. PRN severe pain   • promethazine (PHENERGAN) 25 MG tablet 1-2 q6h prn, likely sedating   • psyllium (Metamucil Smooth Texture) 58.6 % powder 1 teaspoon daily, increase by 1 teaspoon every 3 days to a max of 2 tablespoons   • SUMAtriptan (Imitrex) 100 MG tablet Take 1 at onset of Migraine, may rpt in 2 hrs. Max 2 tab in 24h or 3 days/week   • traMADol (ULTRAM) 50 MG tablet Take 2 tablets by mouth Every 8 (Eight) Hours As Needed for Moderate Pain or Severe Pain.   • vitamin B-12 (CYANOCOBALAMIN) 1000 MCG tablet Take 1 tablet by mouth Daily.     No current facility-administered medications on file prior to visit.       Objective   Vital Signs:   /72 (BP Location: Right  "arm, Patient Position: Sitting, Cuff Size: Adult)   Pulse 100   Temp 97 °F (36.1 °C) (Temporal)   Resp 18   Ht 172.7 cm (68\")   Wt 97.3 kg (214 lb 8 oz)   SpO2 99%   BMI 32.61 kg/m²       Physical Exam  Vitals and nursing note reviewed.   Constitutional:       General: She is not in acute distress.     Appearance: She is well-developed. She is obese.   HENT:      Head: Normocephalic and atraumatic.   Cardiovascular:      Rate and Rhythm: Normal rate and regular rhythm.      Heart sounds: No murmur heard.  Pulmonary:      Effort: Pulmonary effort is normal.      Breath sounds: Normal breath sounds. No wheezing.      Comments: Breasts: breasts appear normal, no suspicious masses, no skin or nipple changes or axillary nodes, right breast is larger/longer there are some fibrous changes throughout.  No nipple discharge to gentle expression    Musculoskeletal:         General: Normal range of motion.   Skin:     General: Skin is warm and dry.      Findings: No rash.      Comments: No palpable lymphadenopathy   Neurological:      Mental Status: She is alert and oriented to person, place, and time.            No visits with results within 1 Day(s) from this visit.   Latest known visit with results is:   Office Visit on 12/13/2022   Component Date Value Ref Range Status   • Total Cholesterol 12/13/2022 238 (H)  100 - 199 mg/dL Final   • Triglycerides 12/13/2022 198 (H)  0 - 149 mg/dL Final   • HDL Cholesterol 12/13/2022 47  >39 mg/dL Final   • VLDL Cholesterol Walker 12/13/2022 36  5 - 40 mg/dL Final   • LDL Chol Calc ([facility]) 12/13/2022 155 (H)  0 - 99 mg/dL Final   • Glucose 12/13/2022 101 (H)  70 - 99 mg/dL Final   • BUN 12/13/2022 11  6 - 24 mg/dL Final   • Creatinine 12/13/2022 1.12 (H)  0.57 - 1.00 mg/dL Final   • EGFR Result 12/13/2022 59 (L)  >59 mL/min/1.73 Final   • BUN/Creatinine Ratio 12/13/2022 10  9 - 23 Final   • Sodium 12/13/2022 142  134 - 144 mmol/L Final   • Potassium 12/13/2022 4.6  3.5 - 5.2 mmol/L " Final   • Chloride 12/13/2022 101  96 - 106 mmol/L Final   • Total CO2 12/13/2022 26  20 - 29 mmol/L Final   • Calcium 12/13/2022 9.6  8.7 - 10.2 mg/dL Final   • Total Protein 12/13/2022 7.4  6.0 - 8.5 g/dL Final   • Albumin 12/13/2022 4.7  3.8 - 4.9 g/dL Final   • Globulin 12/13/2022 2.7  1.5 - 4.5 g/dL Final   • A/G Ratio 12/13/2022 1.7  1.2 - 2.2 Final   • Total Bilirubin 12/13/2022 0.3  0.0 - 1.2 mg/dL Final   • Alkaline Phosphatase 12/13/2022 83  44 - 121 IU/L Final   • AST (SGOT) 12/13/2022 20  0 - 40 IU/L Final   • ALT (SGPT) 12/13/2022 18  0 - 32 IU/L Final   • WBC 12/13/2022 5.2  3.4 - 10.8 x10E3/uL Final   • RBC 12/13/2022 4.75  3.77 - 5.28 x10E6/uL Final   • Hemoglobin 12/13/2022 14.8  11.1 - 15.9 g/dL Final   • Hematocrit 12/13/2022 43.6  34.0 - 46.6 % Final   • MCV 12/13/2022 92  79 - 97 fL Final   • MCH 12/13/2022 31.2  26.6 - 33.0 pg Final   • MCHC 12/13/2022 33.9  31.5 - 35.7 g/dL Final   • RDW 12/13/2022 12.0  11.7 - 15.4 % Final   • Platelets 12/13/2022 310  150 - 450 x10E3/uL Final   • Neutrophil Rel % 12/13/2022 55  Not Estab. % Final   • Lymphocyte Rel % 12/13/2022 31  Not Estab. % Final   • Monocyte Rel % 12/13/2022 11  Not Estab. % Final   • Eosinophil Rel % 12/13/2022 2  Not Estab. % Final   • Basophil Rel % 12/13/2022 1  Not Estab. % Final   • Neutrophils Absolute 12/13/2022 2.9  1.4 - 7.0 x10E3/uL Final   • Lymphocytes Absolute 12/13/2022 1.6  0.7 - 3.1 x10E3/uL Final   • Monocytes Absolute 12/13/2022 0.6  0.1 - 0.9 x10E3/uL Final   • Eosinophils Absolute 12/13/2022 0.1  0.0 - 0.4 x10E3/uL Final   • Basophils Absolute 12/13/2022 0.0  0.0 - 0.2 x10E3/uL Final   • Immature Granulocyte Rel % 12/13/2022 0  Not Estab. % Final   • Immature Grans Absolute 12/13/2022 0.0  0.0 - 0.1 x10E3/uL Final   • 25 Hydroxy, Vitamin D 12/13/2022 61.3  30.0 - 100.0 ng/mL Final    Comment: Vitamin D deficiency has been defined by the [place] of  Medicine and an Endocrine Society practice guideline as a  level  of serum 25-OH vitamin D less than 20 ng/mL (1,2).  The Endocrine Society went on to further define vitamin D  insufficiency as a level between 21 and 29 ng/mL (2).  1. IOM ([place] of Medicine). 2010. Dietary reference     intakes for calcium and D. [place]: The     National Academies Press.  2. Corry MF, Hari SAGE, Tegan NINA, et al.     Evaluation, treatment, and prevention of vitamin D     deficiency: an Endocrine Society clinical practice     guideline. JCEM. 2011 Jul; 96(7):1911-30.     • TSH 12/13/2022 0.270 (L)  0.450 - 4.500 uIU/mL Final   • Free T4 12/13/2022 1.27  0.82 - 1.77 ng/dL Final   • T3, Free 12/13/2022 2.9  2.0 - 4.4 pg/mL Final   • Vitamin B-12 12/13/2022 212 (L)  232 - 1,245 pg/mL Final       Lab Results   Component Value Date    CHLPL 238 (H) 12/13/2022    TRIG 198 (H) 12/13/2022    HDL 47 12/13/2022     (H) 12/13/2022     Lab Results   Component Value Date    TSH 0.270 (L) 12/13/2022     Lab Results   Component Value Date    GLUCOSE 101 (H) 12/13/2022    BUN 11 12/13/2022    CREATININE 1.12 (H) 12/13/2022    EGFRIFNONA 78 01/03/2022    EGFRIFAFRI 90 01/03/2022    BCR 10 12/13/2022    K 4.6 12/13/2022    CO2 26 12/13/2022    CALCIUM 9.6 12/13/2022    PROTENTOTREF 7.4 12/13/2022    ALBUMIN 4.7 12/13/2022    LABIL2 1.7 12/13/2022    AST 20 12/13/2022    ALT 18 12/13/2022     Lab Results   Component Value Date    WBC 5.2 12/13/2022    HGB 14.8 12/13/2022    HCT 43.6 12/13/2022    MCV 92 12/13/2022     12/13/2022                      Assessment and Plan    Diagnoses and all orders for this visit:    1. Class 1 drug-induced obesity with serious comorbidity and body mass index (BMI) of 32.0 to 32.9 in adult (Primary)  Assessment & Plan:  Patient's (Body mass index is 32.61 kg/m².) indicates that they are obese (BMI >30) with health conditions that include dyslipidemias . Weight is unchanged. BMI is is above average; BMI management plan is completed. We discussed  "portion control and increasing exercise.       2. Galactorrhea  -     Prolactin  -     Mammo Diagnostic Digital Tomosynthesis Right With CAD    3. Breast asymmetry on examination  -     Cancel: Mammo Screening Digital Tomosynthesis Bilateral With CAD; Future  -     US Breast Right Limited; Future  -     Prolactin  -     US Axilla Right; Future  -     Mammo Diagnostic Digital Tomosynthesis Right With CAD    4. Lymphadenopathy  -     Comprehensive Metabolic Panel  -     CBC & Differential  -     US Breast Right Limited; Future  -     Mammo Diagnostic Digital Tomosynthesis Right With CAD    5. Encounter for screening mammogram for breast cancer  -     Mammo Diagnostic Digital Tomosynthesis Right With CAD  -     Mammo Screening Modified With Tomosynthesis Left With CAD; Future    6. B12 deficiency  -     Vitamin B12    7. Acquired hypothyroidism  -     TSH  -     T4, Free  -     T3, Free    8. Sjogren's syndrome, with unspecified organ involvement (HCC)    Asymmetric change in right breast.  Patient is concerned about previous history of \" hotspots\" on PET scans as described by oncologist Dr. Mcgee she did have some abdominal lymphadenopathy as well probable axillary lymph nodes which have not been followed up in about 5 years.  Ordering mammograms and ultrasounds as above    Galactorrhea, medication review we will check a prolactin level and if elevated order an MRI of the pituitary looking for a pituitary adenoma. medication review she is not taking H2 blockers or verapamil.  However she is taking a multitude of medications that have been linked to increased prolactin levels she is taking opiate, Percocet on a regular basis as well as Zofran, Abilify, Premarin cream vaginally, Cymbalta  Additionally checking thyroid and renal function    There are no discontinued medications.      Follow Up     Return in about 3 weeks (around 2/6/2023) for as previously scheduled.    Patient was given instructions and counseling " regarding her condition or for health maintenance advice. Please see specific information pulled into the AVS if appropriate.

## 2023-01-16 NOTE — ASSESSMENT & PLAN NOTE
Patient's (Body mass index is 32.61 kg/m².) indicates that they are obese (BMI >30) with health conditions that include dyslipidemias . Weight is unchanged. BMI is is above average; BMI management plan is completed. We discussed portion control and increasing exercise.

## 2023-01-17 DIAGNOSIS — E03.9 ACQUIRED HYPOTHYROIDISM: ICD-10-CM

## 2023-01-17 LAB
ALBUMIN SERPL-MCNC: 4.6 G/DL (ref 3.8–4.9)
ALBUMIN/GLOB SERPL: 1.8 {RATIO} (ref 1.2–2.2)
ALP SERPL-CCNC: 80 IU/L (ref 44–121)
ALT SERPL-CCNC: 21 IU/L (ref 0–32)
AST SERPL-CCNC: 24 IU/L (ref 0–40)
BASOPHILS # BLD AUTO: 0 X10E3/UL (ref 0–0.2)
BASOPHILS NFR BLD AUTO: 1 %
BILIRUB SERPL-MCNC: 0.3 MG/DL (ref 0–1.2)
BUN SERPL-MCNC: 14 MG/DL (ref 6–24)
BUN/CREAT SERPL: 15 (ref 9–23)
CALCIUM SERPL-MCNC: 9.6 MG/DL (ref 8.7–10.2)
CHLORIDE SERPL-SCNC: 103 MMOL/L (ref 96–106)
CO2 SERPL-SCNC: 25 MMOL/L (ref 20–29)
CREAT SERPL-MCNC: 0.91 MG/DL (ref 0.57–1)
EGFRCR SERPLBLD CKD-EPI 2021: 76 ML/MIN/1.73
EOSINOPHIL # BLD AUTO: 0.1 X10E3/UL (ref 0–0.4)
EOSINOPHIL NFR BLD AUTO: 2 %
ERYTHROCYTE [DISTWIDTH] IN BLOOD BY AUTOMATED COUNT: 11.9 % (ref 11.7–15.4)
GLOBULIN SER CALC-MCNC: 2.6 G/DL (ref 1.5–4.5)
GLUCOSE SERPL-MCNC: 93 MG/DL (ref 70–99)
HCT VFR BLD AUTO: 44.2 % (ref 34–46.6)
HGB BLD-MCNC: 14.8 G/DL (ref 11.1–15.9)
IMM GRANULOCYTES # BLD AUTO: 0 X10E3/UL (ref 0–0.1)
IMM GRANULOCYTES NFR BLD AUTO: 0 %
LYMPHOCYTES # BLD AUTO: 1.4 X10E3/UL (ref 0.7–3.1)
LYMPHOCYTES NFR BLD AUTO: 31 %
MCH RBC QN AUTO: 31 PG (ref 26.6–33)
MCHC RBC AUTO-ENTMCNC: 33.5 G/DL (ref 31.5–35.7)
MCV RBC AUTO: 93 FL (ref 79–97)
MONOCYTES # BLD AUTO: 0.5 X10E3/UL (ref 0.1–0.9)
MONOCYTES NFR BLD AUTO: 11 %
NEUTROPHILS # BLD AUTO: 2.5 X10E3/UL (ref 1.4–7)
NEUTROPHILS NFR BLD AUTO: 55 %
PLATELET # BLD AUTO: 314 X10E3/UL (ref 150–450)
POTASSIUM SERPL-SCNC: 4.6 MMOL/L (ref 3.5–5.2)
PROLACTIN SERPL-MCNC: 6.4 NG/ML (ref 4.8–23.3)
PROT SERPL-MCNC: 7.2 G/DL (ref 6–8.5)
RBC # BLD AUTO: 4.78 X10E6/UL (ref 3.77–5.28)
SODIUM SERPL-SCNC: 142 MMOL/L (ref 134–144)
T3FREE SERPL-MCNC: 3.4 PG/ML (ref 2–4.4)
T4 FREE SERPL-MCNC: 1.5 NG/DL (ref 0.82–1.77)
TSH SERPL DL<=0.005 MIU/L-ACNC: 0.17 UIU/ML (ref 0.45–4.5)
VIT B12 SERPL-MCNC: 567 PG/ML (ref 232–1245)
WBC # BLD AUTO: 4.5 X10E3/UL (ref 3.4–10.8)

## 2023-01-17 RX ORDER — LEVOTHYROXINE SODIUM 137 UG/1
150 TABLET ORAL EVERY 24 HOURS
Qty: 90 TABLET | Refills: 1 | Status: SHIPPED | OUTPATIENT
Start: 2023-01-17

## 2023-01-24 DIAGNOSIS — F41.9 ANXIETY: ICD-10-CM

## 2023-01-24 RX ORDER — DULOXETIN HYDROCHLORIDE 60 MG/1
60 CAPSULE, DELAYED RELEASE ORAL DAILY
Qty: 90 CAPSULE | Refills: 1 | Status: SHIPPED | OUTPATIENT
Start: 2023-01-24

## 2023-01-26 ENCOUNTER — HOSPITAL ENCOUNTER (OUTPATIENT)
Dept: ULTRASOUND IMAGING | Facility: HOSPITAL | Age: 53
Discharge: HOME | End: 2023-01-26
Payer: COMMERCIAL

## 2023-01-26 ENCOUNTER — HOSPITAL ENCOUNTER (OUTPATIENT)
Dept: MAMMOGRAPHY | Facility: HOSPITAL | Age: 53
Discharge: HOME | End: 2023-01-26
Payer: COMMERCIAL

## 2023-01-26 DIAGNOSIS — R59.1 LYMPHADENOPATHY: ICD-10-CM

## 2023-01-26 DIAGNOSIS — N64.89 BREAST ASYMMETRY ON EXAMINATION: ICD-10-CM

## 2023-01-26 DIAGNOSIS — Z12.31 ENCOUNTER FOR SCREENING MAMMOGRAM FOR BREAST CANCER: ICD-10-CM

## 2023-01-26 PROCEDURE — 76642 ULTRASOUND BREAST LIMITED: CPT

## 2023-01-26 PROCEDURE — 77066 DX MAMMO INCL CAD BI: CPT

## 2023-01-26 PROCEDURE — 76882 US LMTD JT/FCL EVL NVASC XTR: CPT

## 2023-01-26 PROCEDURE — G0279 TOMOSYNTHESIS, MAMMO: HCPCS

## 2023-02-03 NOTE — PROGRESS NOTES
"  Chief Complaint   Patient presents with   • Allergies   • Annual Exam   • Hyperlipidemia   • Hypothyroidism       History of Present Illness  Subjective   Barbara Carter is a 52 y.o. female.       The patient is here: for coordination of medical care to discuss health maintenance and disease prevention to follow up on multiple medical problems.  Last comprehensive physical was on 01/24/2022.  Previous physical was performed by  Dilia Griffin MD  Overall has: moderate activity with work/home activities, exercises 2 - 3 times per week, good appetite, feels well with minor complaints, decreased energy level and is sleeping well. Nutrition: eating a variety of foods. Last tetanus shot was 08/24/2019. Patient is doing routine self breast exams: occasionally    Patient is not following a low cholesterol diet.   Currently is not on statin therapy.  Patient reports not exercising.     Patient does report depression and anxiety is significantly better since starting Abilify.  She does state increasing dose may help more but unfortunately due to cost she did not increase from 2 to 4 mg daily and has continued a single tablet, stating that with the cost of all of her other medicines she wanted to stretch it out.    Last Pap was 7/31/2019 Pap was negative and HPV was negative, repeat in 5 years.    Galactorrhea, left side clear and some brown discharge from right breast for couple of months.   Prolactin level was normal  medication review, she is taking a multitude of medications that could possibly cause galactorrhea she is taking opiate, Percocet on a regular basis as well as Zofran, Premarin cream vaginally, and Cymbalta she states galactorrhea started before she started Abilify     hypothyroid just reduced levothyroxine    • .Copied from note from Dr Mcgee prior to longterm in 2017:  •  \" Additional studies including PET scanning was performed, showing a very small lymph nodes with SUVs in the range of " "1.7-2.5. She has seen a surgeon for possible attempt at axillary biopsy but no nodes suitable for biopsy were identified.\"   Had originally been referred to Dr. Mcgee around 2015 following adenopathy in her abdomen speculated to be due to Sjogren's syndrome.  He followed for a couple years with routine PET scans and she is asking if she needs continued surveillance at this time.      PHQ-9 Depression Screening  Little interest or pleasure in doing things? 0-->not at all   Feeling down, depressed, or hopeless? 0-->not at all   Trouble falling or staying asleep, or sleeping too much?     Feeling tired or having little energy?     Poor appetite or overeating?     Feeling bad about yourself - or that you are a failure or have let yourself or your family down?     Trouble concentrating on things, such as reading the newspaper or watching television?     Moving or speaking so slowly that other people could have noticed? Or the opposite - being so fidgety or restless that you have been moving around a lot more than usual?     Thoughts that you would be better off dead, or of hurting yourself in some way?     PHQ-9 Total Score 0   If you checked off any problems, how difficult have these problems made it for you to do your work, take care of things at home, or get along with other people?             Recent Hospitalizations:  No hospitalization(s) within the last year..  ccc      I personally reviewed and updated the patient's allergies, medications, problem list, and past medical, surgical, social, and family history. I have reviewed and confirmed the accuracy of the HPI and ROS as documented by the MA/LPN/RN Dilia Griffin MD    Family History   Problem Relation Age of Onset   • Thyroid disease Mother    • Diabetes Mother         DMII   • Heart failure Mother    • Hypertension Mother    • Stroke Father    • Arthritis Father    • Colon cancer Paternal Grandfather        Social History     Tobacco Use   • " Smoking status: Never   • Smokeless tobacco: Never   Vaping Use   • Vaping Use: Never used   Substance Use Topics   • Alcohol use: No   • Drug use: Never       Past Surgical History:   Procedure Laterality Date   • APPENDECTOMY     •  SECTION     • CHOLECYSTECTOMY         Patient Active Problem List   Diagnosis   • Cervical lymphadenopathy   • Fibromyalgia   • Chronic headache   • Gastroesophageal reflux disease   • Hyperlipidemia   • Hypothyroidism   • Polyarthralgia   • Prolonged QT interval   • Sjogren's syndrome (HCC)   • Tricuspid valve regurgitation   • Vitamin D deficiency   • Lymphadenopathy   • Cervical disc disorder at C6-C7 level with radiculopathy   • Neck pain   • Chronic bilateral low back pain without sciatica   • Chronic pain syndrome   • Anxiety   • Other acute recurrent sinusitis   • Amenorrhea   • Allergic rhinitis   • Abnormal computed tomography scan   • Acute mesenteric adenitis   • Post-COVID-19 syndrome   • Nausea   • Mucositis   • Nasal burning   • Acute otalgia, left   • TMJ (sprain of temporomandibular joint)   • Dysuria   • Complaints of total body pain   • 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 In 1 week increase to 2 if needed for depression and anxiety, Disp: 60 tablet, Rfl: 1  •  Artificial Saliva (SALIVAMAX) pack, Take 1 packet by mouth Daily. Dissolve 1 packet in 30 oz of water up to 10x daily. Swish half of solution x 1 minute, spit, then repeat., Disp: , Rfl:   •  aspirin (aspirin) 81 MG EC tablet, Take 1 tablet by mouth Daily., Disp: , Rfl:   •  Carboxymethylcellul-Glycerin 0.5-0.9 % solution, Apply 1 drop to eye(s) as  directed by provider As Needed., Disp: , Rfl:   •  cevimeline (Evoxac) 30 MG capsule, Take 1 capsule by mouth Every 8 (Eight) Hours., Disp: 90 capsule, Rfl: 1  •  clonazePAM (KlonoPIN) 0.5 MG tablet, Take 1 tablet by mouth 2 (Two) Times a Day As Needed for Anxiety., Disp: 180 tablet, Rfl: 0  •  clotrimazole (MYCELEX) 10 MG reji, Take 1 tablet by mouth 4 (Four) Times a Day., Disp: 40 tablet, Rfl: 0  •  conjugated estrogens (Premarin) 0.625 MG/GM vaginal cream, Insert  into the vagina 2 (Two) Times a Week., Disp: 30 g, Rfl: 12  •  cyclobenzaprine (FLEXERIL) 10 MG tablet, Take 1 tablet by mouth At Night As Needed for Muscle Spasms., Disp: 30 tablet, Rfl: 0  •  DULoxetine (Cymbalta) 60 MG capsule, Take 1 capsule by mouth Daily., Disp: 90 capsule, Rfl: 1  •  ergocalciferol (ERGOCALCIFEROL) 1.25 MG (94965 UT) capsule, Take 1 capsule by mouth 2 (Two) Times a Week., Disp: 24 capsule, Rfl: 3  •  fluticasone (FLONASE) 50 MCG/ACT nasal spray, 2 sprays into the nostril(s) as directed by provider Daily., Disp: , Rfl:   •  gabapentin (NEURONTIN) 300 MG capsule, Take 2 capsules by mouth 2 (Two) Times a Day. For pain, Disp: 360 capsule, Rfl: 2  •  hydroxychloroquine (PLAQUENIL) 200 MG tablet, Take 2 tablets by mouth Daily., Disp: , Rfl:   •  levothyroxine (Synthroid) 137 MCG tablet, Take 1 tablet by mouth Daily., Disp: 90 tablet, Rfl: 1  •  Lidocaine Viscous HCl (XYLOCAINE) 2 % solution, Take 10 mL by mouth As Needed for Moderate Pain . Dilute with salt water swish and spit up to 6 times a day, Disp: 100 mL, Rfl: 0  •  Multiple Vitamins-Minerals (CENTRUM ADULTS) tablet, Take 1 tablet by mouth Daily., Disp: , Rfl:   •  naloxone (NARCAN) 4 MG/0.1ML nasal spray, 1 spray into the nostril(s) as directed by provider As Needed (narcotic overdose)., Disp: 1 each, Rfl: 1  •  omeprazole (priLOSEC) 40 MG capsule, Take 1 capsule by mouth Daily., Disp: 30 capsule, Rfl: 5  •  ondansetron (Zofran) 8 MG tablet, Take 1 tablet by mouth Every 12  "(Twelve) Hours As Needed for Nausea or Vomiting., Disp: 30 tablet, Rfl: 3  •  oxyCODONE-acetaminophen (Percocet) 5-325 MG per tablet, Take 1 tablet by mouth Every Night. PRN severe pain, Disp: 90 tablet, Rfl: 0  •  promethazine (PHENERGAN) 25 MG tablet, 1-2 q6h prn, likely sedating, Disp: 20 tablet, Rfl: 5  •  psyllium (Metamucil Smooth Texture) 58.6 % powder, 1 teaspoon daily, increase by 1 teaspoon every 3 days to a max of 2 tablespoons, Disp: 425 g, Rfl: 12  •  SUMAtriptan (Imitrex) 100 MG tablet, Take 1 at onset of Migraine, may rpt in 2 hrs. Max 2 tab in 24h or 3 days/week, Disp: 18 tablet, Rfl: 11  •  traMADol (ULTRAM) 50 MG tablet, Take 2 tablets by mouth Every 8 (Eight) Hours As Needed for Moderate Pain or Severe Pain., Disp: 270 tablet, Rfl: 0  •  vitamin B-12 (CYANOCOBALAMIN) 1000 MCG tablet, Take 1 tablet by mouth Daily., Disp: 30 tablet, Rfl: 12    Objective   /70 (BP Location: Right arm, Patient Position: Sitting, Cuff Size: Adult)   Pulse 94   Temp 98 °F (36.7 °C) (Infrared)   Resp 18   Ht 172.7 cm (68\")   Wt 99.1 kg (218 lb 6.4 oz)   LMP  (LMP Unknown)   SpO2 98% Comment: room air  BMI 33.21 kg/m²   BP Readings from Last 3 Encounters:   02/06/23 108/70   01/16/23 126/72   12/13/22 112/64     Wt Readings from Last 3 Encounters:   02/06/23 99.1 kg (218 lb 6.4 oz)   01/16/23 97.3 kg (214 lb 8 oz)   12/13/22 96.8 kg (213 lb 6.4 oz)     Physical Exam  Constitutional:       General: She is not in acute distress.     Appearance: She is well-developed.   HENT:      Head: Normocephalic and atraumatic.      Right Ear: External ear normal.      Left Ear: External ear normal.   Eyes:      Conjunctiva/sclera: Conjunctivae normal.      Pupils: Pupils are equal, round, and reactive to light.   Neck:      Thyroid: No thyromegaly.      Trachea: No tracheal deviation.   Cardiovascular:      Rate and Rhythm: Normal rate and regular rhythm.      Heart sounds: No murmur heard.    No friction rub. No gallop. "   Pulmonary:      Effort: Pulmonary effort is normal.      Breath sounds: Normal breath sounds. No wheezing or rales.   Abdominal:      General: Bowel sounds are normal.      Palpations: Abdomen is soft.      Tenderness: There is no abdominal tenderness.   Musculoskeletal:         General: Normal range of motion.      Cervical back: Normal range of motion and neck supple.   Lymphadenopathy:      Cervical: No cervical adenopathy.   Skin:     General: Skin is warm and dry.      Findings: No rash.   Neurological:      Mental Status: She is alert and oriented to person, place, and time.   Psychiatric:         Behavior: Behavior normal.     Breast exam deferred today, had previously been performed 1/16/2023    Data / Lab Results:  No results found for: HGBA1C  Common labs    Common Labs 12/13/22 12/13/22 12/13/22 1/16/23 1/16/23    1535 1535 1535 1156 1156   Glucose  101 (A)  93    BUN  11  14    Creatinine  1.12 (A)  0.91    Sodium  142  142    Potassium  4.6  4.6    Chloride  101  103    Calcium  9.6  9.6    Total Protein  7.4  7.2    Albumin  4.7  4.6    Total Bilirubin  0.3  0.3    Alkaline Phosphatase  83  80    AST (SGOT)  20  24    ALT (SGPT)  18  21    WBC   5.2  4.5   Hemoglobin   14.8  14.8   Hematocrit   43.6  44.2   Platelets   310  314   Total Cholesterol 238 (A)       Triglycerides 198 (A)       HDL Cholesterol 47       LDL Cholesterol  155 (A)       (A) Abnormal value            The 10-year ASCVD risk score (Ileana DK, et al., 2019) is: 1.6%    Values used to calculate the score:      Age: 52 years      Sex: Female      Is Non- : No      Diabetic: No      Tobacco smoker: No      Systolic Blood Pressure: 108 mmHg      Is BP treated: No      HDL Cholesterol: 47 mg/dL      Total Cholesterol: 238 mg/dL    Age-appropriate Screening Schedule:  Refer to the list below for future screening recommendations based on patient's age, sex and/or medical conditions. Orders for these recommended  tests are listed in the plan section. The patient has been provided with a written plan.    Health Maintenance   Topic Date Due   • ZOSTER VACCINE (1 of 2) Never done   • LIPID PANEL  12/13/2023   • PAP SMEAR  07/31/2024   • MAMMOGRAM  01/26/2025   • TDAP/TD VACCINES (2 - Td or Tdap) 08/24/2029   • INFLUENZA VACCINE  Completed           Assessment & Plan      Medications        Problem List         LOS        Diagnoses and all orders for this visit:    1. Annual physical exam (Primary)    2. Galactorrhea on both sides  -     Ambulatory Referral to General Surgery    3. Breast asymmetry in female  -     Ambulatory Referral to General Surgery    4. Acquired hypothyroidism    5. Anxiety    6. Lymphadenopathy    7. Mixed hyperlipidemia    8. Allergic rhinitis, unspecified seasonality, unspecified trigger    9. Class 1 drug-induced obesity with serious comorbidity and body mass index (BMI) of 33.0 to 33.9 in adult  Assessment & Plan:  Patient's (Body mass index is 33.21 kg/m².) indicates that they are obese (BMI >30) with health conditions that include dyslipidemias and GERD . Weight is unchanged. BMI  is above average; BMI management plan is completed. We discussed portion control and increasing exercise.         The patient was counseled regarding nutrition, physical activity, healthy weight, injury prevention, immunizations and preventative health screenings.  Encouraged to get Shingrix vaccinations.  Otherwise up-to-date.  Did encourage increased physical activity for weight management.    Your mammogram is normal, No mammographic or sonographic evidence of malignancy, BI-RADS 1, should repeat in 1 year.   Galactorrhea with report of brown discharge, referring to breast specialist, surgeon Dr. Yuan to determine if additional testing would be warranted.    Anxiety greatly reduced with addition of Abilify.  Offered to increase to 5 mg, patient intends to continue 2 mg daily at this time.  Due to  cost    Hypothyroidism, 2 weeks ago reduce levothyroxine from 150 to 137 mcg daily.  Will recheck thyroid in 1 month        Return in about 4 weeks (around 3/6/2023) for thyroid, b121, review labs.      Expected course, medications, and adverse effects discussed.    Call or return if worsening or persistent symptoms.    I wore protective equipment throughout this patient encounter including a mask and eye protection.   Hand hygiene was performed before and after exam.   The complete contents of the Assessment and Plan and Data / Lab Results as documented above have been reviewed and addressed by myself with the patient today as part of an ongoing evaluation / treatment plan.    If separate E/M service has been provided today it was significant, medically necessary, and separately identifiable.

## 2023-02-06 ENCOUNTER — OFFICE VISIT (OUTPATIENT)
Dept: FAMILY MEDICINE CLINIC | Facility: CLINIC | Age: 53
End: 2023-02-06
Payer: COMMERCIAL

## 2023-02-06 VITALS
HEART RATE: 94 BPM | TEMPERATURE: 98 F | RESPIRATION RATE: 18 BRPM | DIASTOLIC BLOOD PRESSURE: 70 MMHG | WEIGHT: 218.4 LBS | SYSTOLIC BLOOD PRESSURE: 108 MMHG | HEIGHT: 68 IN | OXYGEN SATURATION: 98 % | BODY MASS INDEX: 33.1 KG/M2

## 2023-02-06 DIAGNOSIS — F41.9 ANXIETY: ICD-10-CM

## 2023-02-06 DIAGNOSIS — E66.1 CLASS 1 DRUG-INDUCED OBESITY WITH SERIOUS COMORBIDITY AND BODY MASS INDEX (BMI) OF 33.0 TO 33.9 IN ADULT: ICD-10-CM

## 2023-02-06 DIAGNOSIS — E78.2 MIXED HYPERLIPIDEMIA: ICD-10-CM

## 2023-02-06 DIAGNOSIS — J30.9 ALLERGIC RHINITIS, UNSPECIFIED SEASONALITY, UNSPECIFIED TRIGGER: ICD-10-CM

## 2023-02-06 DIAGNOSIS — R59.1 LYMPHADENOPATHY: ICD-10-CM

## 2023-02-06 DIAGNOSIS — Z00.00 ANNUAL PHYSICAL EXAM: Primary | ICD-10-CM

## 2023-02-06 DIAGNOSIS — N64.3 GALACTORRHEA ON BOTH SIDES: ICD-10-CM

## 2023-02-06 DIAGNOSIS — N64.89 BREAST ASYMMETRY IN FEMALE: ICD-10-CM

## 2023-02-06 DIAGNOSIS — E03.9 ACQUIRED HYPOTHYROIDISM: ICD-10-CM

## 2023-02-06 PROCEDURE — 99396 PREV VISIT EST AGE 40-64: CPT | Performed by: FAMILY MEDICINE

## 2023-02-06 PROCEDURE — 99214 OFFICE O/P EST MOD 30 MIN: CPT | Performed by: FAMILY MEDICINE

## 2023-02-06 NOTE — ASSESSMENT & PLAN NOTE
Patient's (Body mass index is 33.21 kg/m².) indicates that they are obese (BMI >30) with health conditions that include dyslipidemias and GERD . Weight is unchanged. BMI  is above average; BMI management plan is completed. We discussed portion control and increasing exercise.

## 2023-02-18 DIAGNOSIS — F34.1 PERSISTENT DEPRESSIVE DISORDER: ICD-10-CM

## 2023-02-20 RX ORDER — ARIPIPRAZOLE 2 MG/1
TABLET ORAL
Qty: 60 TABLET | Refills: 1 | Status: SHIPPED | OUTPATIENT
Start: 2023-02-20

## 2023-03-03 ENCOUNTER — OFFICE VISIT (OUTPATIENT)
Dept: SURGERY | Facility: CLINIC | Age: 53
End: 2023-03-03
Payer: COMMERCIAL

## 2023-03-03 VITALS
DIASTOLIC BLOOD PRESSURE: 88 MMHG | WEIGHT: 218.2 LBS | OXYGEN SATURATION: 97 % | TEMPERATURE: 97.5 F | BODY MASS INDEX: 33.07 KG/M2 | HEART RATE: 83 BPM | SYSTOLIC BLOOD PRESSURE: 128 MMHG | HEIGHT: 68 IN | RESPIRATION RATE: 16 BRPM

## 2023-03-03 DIAGNOSIS — Z12.31 ENCOUNTER FOR SCREENING MAMMOGRAM FOR BREAST CANCER: ICD-10-CM

## 2023-03-03 DIAGNOSIS — N64.52 NIPPLE DISCHARGE IN FEMALE: Primary | ICD-10-CM

## 2023-03-03 PROCEDURE — 99204 OFFICE O/P NEW MOD 45 MIN: CPT | Performed by: SURGERY

## 2023-03-07 NOTE — PROGRESS NOTES
GENERAL SURGERY CONSULTATION NOTE    Consult requested by: Dr. Griffin    Patient Care Team:  Dilia Griffin MD as PCP - General (Family Medicine)    Reason for consult: Right breast pain and bilateral nipple discharge    Subjective     Patient is a 52 y.o. female presents with right breast swelling, breast pain and bilateral nipple discharge that started approximately 4 to 6 weeks ago.  She reports that the discharge on the right is intermittent, spontaneous, and sometimes expressible.  At first it was brown, but then became more of a clear color.  She has noticed that the right breast is slightly more tender and has been gradually enlarging.  She has not noticed any skin changes, fevers, or palpable masses.  She brought this to the attention of her primary care physician who subsequently ordered a diagnostic mammogram bilaterally and right breast ultrasound on 1/26/2023 which demonstrated no mammographic or sonographic evidence of malignancy.  However given history of brown discharge, surgical consultation was recommended.  Patient reports that her period started when she was 13 years old, her last menstrual cycle was when she was 42 years old.  She still has her uterus and both ovaries.  She has been pregnant twice with 2 live births and her first child was born when she was 23.  She never breast-fed, took birth control pills for approximately 10 years and has never take any hormone replacement therapy  Family history significant for sister with bladder cancer, maternal first cousin with breast cancer a paternal first cousin with breast cancer, and a maternal grandpa with colon cancer.     Review of Systems   Constitutional: Positive for unexpected weight gain.   Genitourinary: Positive for breast discharge and breast pain. Negative for breast lump.   Allergic/Immunologic: Positive for environmental allergies.        History  Past Medical History:   Diagnosis Date   • Anxiety    • Arthritis    •  "Cluster headache    • Depression    • Fibromyalgia    • Hypothyroidism    • Peripheral neuropathy    • Sjogren's syndrome (HCC)      Past Surgical History:   Procedure Laterality Date   • APPENDECTOMY     •  SECTION     • CHOLECYSTECTOMY       Family History   Problem Relation Age of Onset   • Thyroid disease Mother    • Diabetes Mother         DMII   • Heart failure Mother    • Hypertension Mother    • Stroke Father    • Arthritis Father    • Cancer Sister         bladder cancer   • Colon cancer Paternal Grandfather    • Breast cancer Maternal Cousin    • Breast cancer Paternal Cousin      Social History     Tobacco Use   • Smoking status: Never     Passive exposure: Never   • Smokeless tobacco: Never   Vaping Use   • Vaping Use: Never used   Substance Use Topics   • Alcohol use: No   • Drug use: Never     (Not in a hospital admission)    Allergies:  Sulfacetamide, Butalbital-apap-caffeine, Sulfa antibiotics, and Tricyclic antidepressants    Objective     Vital Signs  /88 (BP Location: Left arm, Patient Position: Sitting, Cuff Size: Large Adult)   Pulse 83   Temp 97.5 °F (36.4 °C) (Infrared)   Resp 16   Ht 172.7 cm (68\")   Wt 99 kg (218 lb 3.2 oz)   SpO2 97%   BMI 33.18 kg/m²      Physical Exam  Vitals reviewed. Exam conducted with a chaperone present.   Constitutional:       Appearance: She is well-developed.   HENT:      Head: Normocephalic and atraumatic.   Eyes:      Pupils: Pupils are equal, round, and reactive to light.   Cardiovascular:      Rate and Rhythm: Normal rate and regular rhythm.   Pulmonary:      Effort: Pulmonary effort is normal.      Breath sounds: Normal breath sounds.   Chest:      Comments: Both breasts were examined in the upright position.  The right breast is markedly larger than the left breast.  Both breasts were palpated and there were no palpable masses noted bilaterally.  I was unable to express any discharge from either nipple.  There are no overlying skin " changes, no skin thickening, and no ulcerations.  Abdominal:      General: There is no distension.      Palpations: Abdomen is soft.      Tenderness: There is no abdominal tenderness.      Hernia: No hernia is present.   Musculoskeletal:         General: Normal range of motion.      Cervical back: Normal range of motion.   Lymphadenopathy:      Cervical: No cervical adenopathy.      Upper Body:      Right upper body: No supraclavicular or axillary adenopathy.      Left upper body: No supraclavicular or axillary adenopathy.   Skin:     General: Skin is warm and dry.      Findings: No rash.   Neurological:      Mental Status: She is alert and oriented to person, place, and time.         Results Review:   Lab Results (last 24 hours)     ** No results found for the last 24 hours. **        No radiology results for the last day      I reviewed the patient's new imaging results and agree with the interpretation.  I reviewed the patient's other test results and agree with the interpretation    Assessment & Plan   Bilateral nipple discharge, right breast pain and enlargement    Discussed with patient that bilateral nipple discharge which is spontaneous is usually considered to be physiologic.  However, given the history of brown discharge, thorough work-up is warranted to ensure that the patient does not have any evidence of malignancy.  My recommendation would be to proceed with an MRI of the breast bilaterally.  If the MRI is negative, then I would consider her discharge to be physiologic and no further work-up would be needed.  If there is evidence of non-mass enhancement or mass below the nipple, then may consider surgical intervention at that time  Patient does take multiple medications which have prolactinemia as a potential side effect.  A prolactin has been checked and is normal.  May consider cessation of medications which are not completely necessary as polypharmacy may be contributing to increased weight gain and  nipple discharge    I discussed the patients findings and my recommendations with the patient.     Kb Yuan MD  03/07/23  11:17 EST

## 2023-04-18 DIAGNOSIS — F41.9 ANXIETY: ICD-10-CM

## 2023-04-18 DIAGNOSIS — K21.9 GASTROESOPHAGEAL REFLUX DISEASE, UNSPECIFIED WHETHER ESOPHAGITIS PRESENT: ICD-10-CM

## 2023-04-18 RX ORDER — OMEPRAZOLE 40 MG/1
40 CAPSULE, DELAYED RELEASE ORAL DAILY
Qty: 30 CAPSULE | Refills: 5 | Status: SHIPPED | OUTPATIENT
Start: 2023-04-18

## 2023-04-18 RX ORDER — CLONAZEPAM 0.5 MG/1
0.5 TABLET ORAL 2 TIMES DAILY PRN
Qty: 180 TABLET | Refills: 0 | Status: SHIPPED | OUTPATIENT
Start: 2023-04-18

## 2023-04-19 DIAGNOSIS — E03.9 ACQUIRED HYPOTHYROIDISM: Primary | ICD-10-CM

## 2023-04-19 NOTE — TELEPHONE ENCOUNTER
Please inform patient I am renewing Klonopin and Prilosec however it is time to recheck thyroid labs and a 3-month check on anxiety and pain management.  Please order a TSH and free T4 and have her schedule an appointment for review.

## 2023-04-26 ENCOUNTER — HOSPITAL ENCOUNTER (OUTPATIENT)
Dept: MRI IMAGING | Facility: HOSPITAL | Age: 53
Discharge: HOME | End: 2023-04-26
Admitting: SURGERY
Payer: COMMERCIAL

## 2023-04-26 DIAGNOSIS — N64.52 NIPPLE DISCHARGE IN FEMALE: ICD-10-CM

## 2023-04-26 LAB
CREAT BLDA-MCNC: 1.1 MG/DL (ref 0.6–1.3)
EGFRCR SERPLBLD CKD-EPI 2021: 60.6 ML/MIN/1.73

## 2023-04-26 PROCEDURE — 82565 ASSAY OF CREATININE: CPT

## 2023-04-26 PROCEDURE — A9579 GAD-BASE MR CONTRAST NOS,1ML: HCPCS | Performed by: SURGERY

## 2023-04-26 PROCEDURE — 77049 MRI BREAST C-+ W/CAD BI: CPT

## 2023-04-26 PROCEDURE — 25010000002 GADOTERIDOL PER 1 ML: Performed by: SURGERY

## 2023-04-26 RX ADMIN — GADOTERIDOL 20 ML: 279.3 INJECTION, SOLUTION INTRAVENOUS at 11:05

## 2023-04-27 ENCOUNTER — TELEPHONE (OUTPATIENT)
Dept: SURGERY | Facility: CLINIC | Age: 53
End: 2023-04-27
Payer: COMMERCIAL

## 2023-04-27 NOTE — TELEPHONE ENCOUNTER
MRI  Results- Per Dr. Yuan MRI is completely negative. Pts dc is most likely physiologic. He doesn't think any further work-up is necessary. If pts dc changes for example becomes bloody, notices a rash, nipple inversion, or feels a lump, then pt should reschedule a visit to be seen again. Called pt and informed. Stated understood.

## 2023-05-22 ENCOUNTER — OFFICE VISIT (OUTPATIENT)
Dept: FAMILY MEDICINE CLINIC | Facility: CLINIC | Age: 53
End: 2023-05-22

## 2023-05-22 VITALS
DIASTOLIC BLOOD PRESSURE: 72 MMHG | HEIGHT: 68 IN | OXYGEN SATURATION: 98 % | HEART RATE: 102 BPM | WEIGHT: 220 LBS | RESPIRATION RATE: 18 BRPM | TEMPERATURE: 98.4 F | BODY MASS INDEX: 33.34 KG/M2 | SYSTOLIC BLOOD PRESSURE: 116 MMHG

## 2023-05-22 DIAGNOSIS — F41.9 ANXIETY: ICD-10-CM

## 2023-05-22 DIAGNOSIS — E53.8 B12 DEFICIENCY: ICD-10-CM

## 2023-05-22 DIAGNOSIS — M50.123 CERVICAL DISC DISORDER AT C6-C7 LEVEL WITH RADICULOPATHY: ICD-10-CM

## 2023-05-22 DIAGNOSIS — G89.4 CHRONIC PAIN SYNDROME: ICD-10-CM

## 2023-05-22 DIAGNOSIS — F34.1 PERSISTENT DEPRESSIVE DISORDER: ICD-10-CM

## 2023-05-22 DIAGNOSIS — M54.2 NECK PAIN: ICD-10-CM

## 2023-05-22 DIAGNOSIS — G47.00 INSOMNIA, UNSPECIFIED TYPE: ICD-10-CM

## 2023-05-22 DIAGNOSIS — M79.7 FIBROMYALGIA: ICD-10-CM

## 2023-05-22 DIAGNOSIS — E03.9 ACQUIRED HYPOTHYROIDISM: Primary | ICD-10-CM

## 2023-05-22 DIAGNOSIS — Q87.19 SJOGREN-LARSSON SYNDROME: ICD-10-CM

## 2023-05-22 DIAGNOSIS — M79.10 MYALGIA: ICD-10-CM

## 2023-05-22 DIAGNOSIS — E66.1 CLASS 1 DRUG-INDUCED OBESITY WITH SERIOUS COMORBIDITY AND BODY MASS INDEX (BMI) OF 33.0 TO 33.9 IN ADULT: ICD-10-CM

## 2023-05-22 RX ORDER — ARIPIPRAZOLE 2 MG/1
4 TABLET ORAL DAILY
Qty: 60 TABLET | Refills: 2 | Status: SHIPPED | OUTPATIENT
Start: 2023-05-22

## 2023-05-22 RX ORDER — HYDROXYCHLOROQUINE SULFATE 200 MG/1
1 TABLET, FILM COATED ORAL 2 TIMES DAILY
COMMUNITY
Start: 2023-04-26

## 2023-05-22 RX ORDER — CLONAZEPAM 0.5 MG/1
0.5 TABLET ORAL 2 TIMES DAILY PRN
Qty: 180 TABLET | Refills: 0 | Status: SHIPPED | OUTPATIENT
Start: 2023-05-22

## 2023-05-22 RX ORDER — PILOCARPINE HYDROCHLORIDE 5 MG/1
1 TABLET, FILM COATED ORAL EVERY 8 HOURS
COMMUNITY
Start: 2023-04-26

## 2023-05-22 RX ORDER — TRAMADOL HYDROCHLORIDE 50 MG/1
100 TABLET ORAL EVERY 8 HOURS PRN
Qty: 270 TABLET | Refills: 0 | Status: SHIPPED | OUTPATIENT
Start: 2023-05-22

## 2023-05-22 RX ORDER — OXYCODONE HYDROCHLORIDE AND ACETAMINOPHEN 5; 325 MG/1; MG/1
1 TABLET ORAL NIGHTLY
Qty: 90 TABLET | Refills: 0 | Status: SHIPPED | OUTPATIENT
Start: 2023-05-22

## 2023-05-22 NOTE — ASSESSMENT & PLAN NOTE
Patient's (Body mass index is 33.45 kg/m².) indicates that they are obese (BMI >30) with health conditions that include dyslipidemias and GERD . Weight is unchanged. BMI  is above average; BMI management plan is completed. We discussed portion control and increasing exercise.

## 2023-05-22 NOTE — PROGRESS NOTES
"Chief Complaint  Pain management  Hypothyroidism and Fibromyalgia    History of Present Illness  Barbara Carter presents today for pain management for diagnosis of fibromyalgia, hypothyroidism and b12 .    Hypothyroidism: Patient presents for evaluation of thyroid function. Symptoms consist of fatigue, weight gain. Symptoms have present for several years. The symptoms are mild.  The problem has been stable. The hypothyroidism is due to hypothyroidism.  Labs in January indicated patient was being overtreated, levothyroxine was reduced from 150 to 137 mcg.  She has not had labs since that time to ensure correct dosage.    Gaining weight not sure if thyroid or abilify    Moods are better. Fewer \"off days\"   Trying to loose weight. Have reduced calories and improved diet. Similar to weight watchers but not offically.   Not exercising but very active watching grandchildren, more active than any previous job.   B12   Last labs done 01/16/2023.   Currently taking 1000mg daily.     Pain Management  Pain Management   Patient states medications are working well.   Aggravating factors include activities, weather.  Pain scale today is 3.  Inspect done on 05/22/2023   Patient pain contract is up to date. Last done on 08/10/2022  Last UDS done on 08/10/2022  Nearly out of percocet held on to last 2 for \"emergency\" of Severe pain.   Typically take 1 percocet every night with tramadol 2 tablets usually once rarley twice if a more pain day.  Last dose at least 8 hours prior to percocet.   Take gabapentin 300 mg bid, do feel better , notice a reduction in leg and knee pain and able to move better, 30 minutes after taking in the mornings  Never feel oversedated. DO have narcan available for event of accidentla overdose.   Taking plaquenil prescribed by Rheumatology, Nadine Wait. For sogrens and fibromyalgia     Take Klonipin at night. Rarely will take Klonipn in day.       Current Outpatient Medications on File Prior to Visit "   Medication Sig    Artificial Saliva (SALIVAMAX) pack Take 1 packet by mouth Daily. Dissolve 1 packet in 30 oz of water up to 10x daily. Swish half of solution x 1 minute, spit, then repeat.    aspirin (aspirin) 81 MG EC tablet Take 1 tablet by mouth Daily.    Carboxymethylcellul-Glycerin 0.5-0.9 % solution Apply 1 drop to eye(s) as directed by provider As Needed.    clotrimazole (MYCELEX) 10 MG reji Take 1 tablet by mouth 4 (Four) Times a Day.    conjugated estrogens (Premarin) 0.625 MG/GM vaginal cream Insert  into the vagina 2 (Two) Times a Week.    DULoxetine (Cymbalta) 60 MG capsule Take 1 capsule by mouth Daily.    ergocalciferol (ERGOCALCIFEROL) 1.25 MG (75976 UT) capsule Take 1 capsule by mouth 2 (Two) Times a Week.    fluticasone (FLONASE) 50 MCG/ACT nasal spray 2 sprays into the nostril(s) as directed by provider Daily.    gabapentin (NEURONTIN) 300 MG capsule Take 2 capsules by mouth 2 (Two) Times a Day. For pain    hydroxychloroquine (PLAQUENIL) 200 MG tablet Take 1 tablet by mouth 2 (Two) Times a Day.    levothyroxine (Synthroid) 137 MCG tablet Take 1 tablet by mouth Daily.    Lidocaine Viscous HCl (XYLOCAINE) 2 % solution Take 10 mL by mouth As Needed for Moderate Pain . Dilute with salt water swish and spit up to 6 times a day    Multiple Vitamins-Minerals (CENTRUM ADULTS) tablet Take 1 tablet by mouth Daily.    naloxone (NARCAN) 4 MG/0.1ML nasal spray 1 spray into the nostril(s) as directed by provider As Needed (narcotic overdose).    omeprazole (priLOSEC) 40 MG capsule Take 1 capsule by mouth Daily.    ondansetron (Zofran) 8 MG tablet Take 1 tablet by mouth Every 12 (Twelve) Hours As Needed for Nausea or Vomiting.    pilocarpine (SALAGEN) 5 MG tablet Take 1 tablet by mouth Every 8 (Eight) Hours.    promethazine (PHENERGAN) 25 MG tablet 1-2 q6h prn, likely sedating    psyllium (Metamucil Smooth Texture) 58.6 % powder 1 teaspoon daily, increase by 1 teaspoon every 3 days to a max of 2 tablespoons  "   SUMAtriptan (Imitrex) 100 MG tablet Take 1 at onset of Migraine, may rpt in 2 hrs. Max 2 tab in 24h or 3 days/week    vitamin B-12 (CYANOCOBALAMIN) 1000 MCG tablet Take 1 tablet by mouth Daily.     No current facility-administered medications on file prior to visit.       Objective   Vital Signs:   /72 (BP Location: Right arm, Patient Position: Sitting, Cuff Size: Adult)   Pulse 102   Temp 98.4 °F (36.9 °C) (Temporal)   Resp 18   Ht 172.7 cm (68\")   Wt 99.8 kg (220 lb)   SpO2 98% Comment: room air  BMI 33.45 kg/m²       Physical Exam  Vitals and nursing note reviewed.   Constitutional:       General: She is not in acute distress.     Appearance: She is well-developed. She is obese.   HENT:      Head: Normocephalic and atraumatic.   Cardiovascular:      Rate and Rhythm: Normal rate and regular rhythm.      Heart sounds: No murmur heard.  Pulmonary:      Effort: Pulmonary effort is normal.      Breath sounds: Normal breath sounds. No wheezing.   Musculoskeletal:         General: Normal range of motion.   Skin:     General: Skin is warm and dry.      Findings: No rash.   Neurological:      Mental Status: She is alert and oriented to person, place, and time.          Office Visit on 05/22/2023   Component Date Value Ref Range Status    TSH 05/22/2023 0.573  0.450 - 4.500 uIU/mL Final    Free T4 05/22/2023 1.44  0.82 - 1.77 ng/dL Final    Vitamin B-12 05/22/2023 924  232 - 1,245 pg/mL Final       Lab Results   Component Value Date    CHLPL 238 (H) 12/13/2022    TRIG 198 (H) 12/13/2022    HDL 47 12/13/2022     (H) 12/13/2022     Lab Results   Component Value Date    TSH 0.573 05/22/2023     Lab Results   Component Value Date    GLUCOSE 93 01/16/2023    BUN 14 01/16/2023    CREATININE 1.10 04/26/2023    EGFRIFNONA 78 01/03/2022    EGFRIFAFRI 90 01/03/2022    BCR 15 01/16/2023    K 4.6 01/16/2023    CO2 25 01/16/2023    CALCIUM 9.6 01/16/2023    PROTENTOTREF 7.2 01/16/2023    ALBUMIN 4.6 01/16/2023    " LABIL2 1.8 01/16/2023    AST 24 01/16/2023    ALT 21 01/16/2023     Lab Results   Component Value Date    WBC 4.5 01/16/2023    HGB 14.8 01/16/2023    HCT 44.2 01/16/2023    MCV 93 01/16/2023     01/16/2023                      Assessment and Plan    Diagnoses and all orders for this visit:    1. Acquired hypothyroidism (Primary)  -     TSH  -     T4, Free    2. B12 deficiency  -     Vitamin B12    3. Fibromyalgia  -     traMADol (ULTRAM) 50 MG tablet; Take 2 tablets by mouth Every 8 (Eight) Hours As Needed for Moderate Pain or Severe Pain.  Dispense: 270 tablet; Refill: 0  -     oxyCODONE-acetaminophen (Percocet) 5-325 MG per tablet; Take 1 tablet by mouth Every Night. PRN severe pain  Dispense: 90 tablet; Refill: 0    4. Myalgia    5. Cervical disc disorder at C6-C7 level with radiculopathy  -     traMADol (ULTRAM) 50 MG tablet; Take 2 tablets by mouth Every 8 (Eight) Hours As Needed for Moderate Pain or Severe Pain.  Dispense: 270 tablet; Refill: 0  -     oxyCODONE-acetaminophen (Percocet) 5-325 MG per tablet; Take 1 tablet by mouth Every Night. PRN severe pain  Dispense: 90 tablet; Refill: 0    6. Class 1 drug-induced obesity with serious comorbidity and body mass index (BMI) of 33.0 to 33.9 in adult  Assessment & Plan:  Patient's (Body mass index is 33.45 kg/m².) indicates that they are obese (BMI >30) with health conditions that include dyslipidemias and GERD . Weight is unchanged. BMI  is above average; BMI management plan is completed. We discussed portion control and increasing exercise.       7. Chronic pain syndrome  -     traMADol (ULTRAM) 50 MG tablet; Take 2 tablets by mouth Every 8 (Eight) Hours As Needed for Moderate Pain or Severe Pain.  Dispense: 270 tablet; Refill: 0  -     oxyCODONE-acetaminophen (Percocet) 5-325 MG per tablet; Take 1 tablet by mouth Every Night. PRN severe pain  Dispense: 90 tablet; Refill: 0    8. Neck pain  -     traMADol (ULTRAM) 50 MG tablet; Take 2 tablets by mouth  Every 8 (Eight) Hours As Needed for Moderate Pain or Severe Pain.  Dispense: 270 tablet; Refill: 0    9. Anxiety  -     clonazePAM (KlonoPIN) 0.5 MG tablet; Take 1 tablet by mouth 2 (Two) Times a Day As Needed for Anxiety.  Dispense: 180 tablet; Refill: 0    10. Sjogren-Korey syndrome    11. Insomnia, unspecified type    12. Persistent depressive disorder  -     ARIPiprazole (ABILIFY) 2 MG tablet; Take 2 tablets by mouth Daily.  Dispense: 60 tablet; Refill: 2      Hypothyroidism, thyroid labs today are at goal, continuing levothyroxine 137 mcg daily.  Fibromyalgia and Sjogren's syndrome on Plaquenil through rheumatology.  Long-term pain management with tramadol during the day and Percocet at night.  Additionally she has been taking Klonopin twice daily as needed for anxiety.  Did discuss multiple controlled substance medications can increase risk of and respiratory suppression andaccidental overdose.  We have on multiple occasions over the past several years to reduce therapy always increasing back to current use due pain levels that limit her activities.  She will again try to take tramadol at night instead of Percocet and we will reduce total dose of tramadol.  Also stressed to reduce Klonopin to as needed only and utilize other stress reduction techniques.  Advised again that with fibromyalgia regular physical activity is necessary but not to overdo it at any time for risk increased pain.    Depression, significantly improved since addition of Abilify, patient is concerned Abilify may be contributing to inability to lose weight.  Continuing Abilify for now along with Cymbalta.    Obesity, need to follow reduced calorie diet, consider more strict adherence to weight watchers program.  Increase physical activity as able    Medications Discontinued During This Encounter   Medication Reason    cevimeline (Evoxac) 30 MG capsule *Therapy completed    hydroxychloroquine (PLAQUENIL) 200 MG tablet Discontinued by  another clinician    cyclobenzaprine (FLEXERIL) 10 MG tablet *Therapy completed    oxyCODONE-acetaminophen (Percocet) 5-325 MG per tablet Reorder    traMADol (ULTRAM) 50 MG tablet Reorder    ARIPiprazole (ABILIFY) 2 MG tablet Reorder    clonazePAM (KlonoPIN) 0.5 MG tablet Reorder       I spent 40 minutes caring for Barbara on this date of service. This time includes time spent by me in the following activities:preparing for the visit, obtaining and/or reviewing a separately obtained history, performing a medically appropriate examination and/or evaluation , counseling and educating the patient/family/caregiver, ordering medications, tests, or procedures, documenting information in the medical record, and care coordination  Follow Up     Return in about 3 months (around 8/22/2023) for pain managment, Recheck anxiety and insonia.    Patient was given instructions and counseling regarding her condition or for health maintenance advice. Please see specific information pulled into the AVS if appropriate.

## 2023-05-23 LAB
T4 FREE SERPL-MCNC: 1.44 NG/DL (ref 0.82–1.77)
TSH SERPL DL<=0.005 MIU/L-ACNC: 0.57 UIU/ML (ref 0.45–4.5)
VIT B12 SERPL-MCNC: 924 PG/ML (ref 232–1245)

## 2023-07-24 DIAGNOSIS — E03.9 ACQUIRED HYPOTHYROIDISM: ICD-10-CM

## 2023-07-24 RX ORDER — LEVOTHYROXINE SODIUM 137 UG/1
150 TABLET ORAL EVERY 24 HOURS
Qty: 90 TABLET | Refills: 0 | Status: SHIPPED | OUTPATIENT
Start: 2023-07-24

## 2023-07-25 ENCOUNTER — OFFICE VISIT (OUTPATIENT)
Dept: FAMILY MEDICINE CLINIC | Facility: CLINIC | Age: 53
End: 2023-07-25
Payer: COMMERCIAL

## 2023-07-25 VITALS
OXYGEN SATURATION: 96 % | BODY MASS INDEX: 33.95 KG/M2 | RESPIRATION RATE: 16 BRPM | DIASTOLIC BLOOD PRESSURE: 75 MMHG | HEIGHT: 68 IN | SYSTOLIC BLOOD PRESSURE: 136 MMHG | WEIGHT: 224 LBS | HEART RATE: 84 BPM

## 2023-07-25 DIAGNOSIS — R59.0 LYMPHADENOPATHY, SUBMENTAL: ICD-10-CM

## 2023-07-25 DIAGNOSIS — R59.0 LYMPHADENOPATHY, SUBMANDIBULAR: Primary | ICD-10-CM

## 2023-07-25 DIAGNOSIS — M35.00 SJOGREN'S SYNDROME, WITH UNSPECIFIED ORGAN INVOLVEMENT: ICD-10-CM

## 2023-07-25 DIAGNOSIS — K11.20 SIALADENITIS: ICD-10-CM

## 2023-07-25 NOTE — PROGRESS NOTES
Chief Complaint  Swollen Glands    Subjective          Barbara Carter presents to [facility] for     History of Present Illness  Barbara is here with an acute complaint of right sided jaw pain with enlarged lymph node. She does have  a history of Sjogren's.  She is a patient of Dr. Wei.  She denies any change in her oral care, no lesions, no dental pain, etc.  She denies any enlarged lymph nodes, tenderness or swelling on the left side of her neck.     She denies any change in allergies.   Discussed possible progression of Sjogren's into lymphoma.  She agrees to begin antibiotics and will have further testing completed if warranted. Strongly encouraged patient to follow up with PCP by Friday if pain/inflammation/lymphadenopathy persists or worsens.   Jaw Pain  This is a new problem. The current episode started in the past 7 days. The problem occurs constantly. The problem has been gradually worsening. Associated symptoms include myalgias, neck pain and swollen glands. Pertinent negatives include no chest pain, chills, coughing, fatigue, headaches, nausea, numbness, rash, sore throat, vertigo, visual change, vomiting or weakness. The symptoms are aggravated by twisting and coughing. She has tried position changes for the symptoms. The treatment provided no relief.       Barbara Carter  has a past medical history of Anxiety, Arthritis, Cluster headache, Depression, Fibromyalgia, Hypothyroidism, Peripheral neuropathy, and Sjogren's syndrome.      Review of Systems   Constitutional:  Positive for appetite change. Negative for chills and fatigue.   HENT:  Positive for ear pain, facial swelling, mouth sores and swollen glands. Negative for dental problem, drooling, sore throat, tinnitus, trouble swallowing and voice change.    Respiratory:  Negative for cough, choking, shortness of breath and wheezing.    Cardiovascular: Negative.  Negative for chest pain.   Gastrointestinal:   "Negative for nausea and vomiting.   Musculoskeletal:  Positive for myalgias and neck pain.   Skin:  Negative for rash.   Neurological: Negative.  Negative for vertigo, weakness and numbness.      Family History   Problem Relation Age of Onset    Thyroid disease Mother     Diabetes Mother         DMII    Heart failure Mother     Hypertension Mother     Stroke Father     Arthritis Father     Cancer Sister         bladder cancer    Colon cancer Paternal Grandfather     Breast cancer Maternal Cousin     Breast cancer Paternal Cousin         Past Surgical History:   Procedure Laterality Date    APPENDECTOMY       SECTION      CHOLECYSTECTOMY          Objective   Vitals:    23 1524   BP: 136/75   BP Location: Left arm   Patient Position: Sitting   Cuff Size: Large Adult   Pulse: 84   Resp: 16   SpO2: 96%   Weight: 102 kg (224 lb)   Height: 172.7 cm (68\")     Physical Exam  Constitutional:       General: She is not in acute distress.     Appearance: She is well-developed.   HENT:      Head: Normocephalic and atraumatic.      Right Ear: External ear normal.      Left Ear: External ear normal.      Mouth/Throat:      Lips: Pink.      Mouth: Mucous membranes are dry.      Tongue: No lesions. Tongue does not deviate from midline.      Palate: Mass and lesions present.      Pharynx: Oropharynx is clear. Uvula midline.      Tonsils: No tonsillar abscesses.        Comments: Inflammation noted at area marked with small whitish lesion.   Eyes:      Conjunctiva/sclera: Conjunctivae normal.      Pupils: Pupils are equal, round, and reactive to light.   Neck:      Thyroid: No thyroid mass, thyromegaly or thyroid tenderness.      Vascular: No carotid bruit.      Trachea: Trachea and phonation normal. No tracheal deviation.        Comments: Patient reports tenderness to neck at areas circled.   Cardiovascular:      Rate and Rhythm: Normal rate and regular rhythm.      Heart sounds: No murmur heard.    No friction rub. No " gallop.   Pulmonary:      Effort: Pulmonary effort is normal.      Breath sounds: Normal breath sounds. No wheezing or rales.   Abdominal:      General: Bowel sounds are normal.      Palpations: Abdomen is soft.      Tenderness: There is no abdominal tenderness.   Musculoskeletal:      Cervical back: Normal range of motion and neck supple. No edema or rigidity. Pain with movement present. Decreased range of motion.   Lymphadenopathy:      Head:      Right side of head: Submental and submandibular adenopathy present. No preauricular, posterior auricular or occipital adenopathy.      Left side of head: No submental, submandibular, tonsillar, preauricular, posterior auricular or occipital adenopathy.      Cervical: No cervical adenopathy.   Skin:     General: Skin is warm and dry.      Findings: No rash.   Neurological:      Mental Status: She is alert and oriented to person, place, and time.   Psychiatric:         Behavior: Behavior normal.          Assessment and Plan    Diagnoses and all orders for this visit:    1. Lymphadenopathy, submandibular (Primary)  Comments:  right side  Antibx given  Return if no resolution.   right side  Antibx given  Return if no resolution.  Orders:  -     amoxicillin-clavulanate (AUGMENTIN) 875-125 MG per tablet; Take 1 tablet by mouth 2 (Two) Times a Day.  Dispense: 20 tablet; Refill: 0    2. Sialadenitis  Comments:  Antibiotics as discussed  Lemon drop candy for increased salivation.  Return in three days if no resolution.  Orders:  -     amoxicillin-clavulanate (AUGMENTIN) 875-125 MG per tablet; Take 1 tablet by mouth 2 (Two) Times a Day.  Dispense: 20 tablet; Refill: 0    3. Sjogren's syndrome, with unspecified organ involvement  Comments:  Here with enlarged submental, submandibular right sided only lymphadenopathy and sialadenitis    4. Lymphadenopathy, submental  Comments:  right side  Antibx given  Return if no resolution.  Orders:  -     amoxicillin-clavulanate (AUGMENTIN)  875-125 MG per tablet; Take 1 tablet by mouth 2 (Two) Times a Day.  Dispense: 20 tablet; Refill: 0       Procedures      Follow Up   Return for Follow up with primary care provider as needed..  Patient was given instructions and counseling regarding her condition or for health maintenance advice. Please see specific information pulled into the AVS if appropriate.    Patient Instructions   Antibiotic medication.  Zyrtec for allergies.  Chloraseptic spray as needed for oral pain.     Change toothbrush after 24 hour antibiotic use.  Gargle with warm salt water for symptomatic relief of sore throat.   Stay hydrated.     Continue current plan of care as discussed.   Take medication as ordered (if applicable).  Lemon drop candy for increased salivation.     Practice good sleep hygiene.  Eat a well balanced diet with fresh fruit and vegetables.    Drink at least 8 bottles of water or equivalent per day.     Limit sweetened beverages, sodas, juices.    Bake, boil, broil or grill your food, avoid eating fried foods.   Exercise at least 150 minutes per week.       Return on Friday if not better.      This document is intended for medical expert use only. Reading of this document by patients and/or patient's family without participating medical staff guidance may result in misinterpretation and unintended morbidity. Any interpretation of such data is the responsibility of the patient and/or family member responsible for the patient in concert with their primary or specialist providers, not to be left for sources of online searches such as [employer], [employer] or similar queries. Relying on these approaches to knowledge may result in misinterpretation, misguided goals of care and even death should patients or family members try recommendations outside of the realm of professional medical care.

## 2023-07-25 NOTE — PATIENT INSTRUCTIONS
Antibiotic medication.  Zyrtec for allergies.  Chloraseptic spray as needed for oral pain.     Change toothbrush after 24 hour antibiotic use.  Gargle with warm salt water for symptomatic relief of sore throat.   Stay hydrated.     Continue current plan of care as discussed.   Take medication as ordered (if applicable).  Lemon drop candy for increased salivation.     Practice good sleep hygiene.  Eat a well balanced diet with fresh fruit and vegetables.    Drink at least 8 bottles of water or equivalent per day.     Limit sweetened beverages, sodas, juices.    Bake, boil, broil or grill your food, avoid eating fried foods.   Exercise at least 150 minutes per week.       Return on Friday if not better.

## 2023-07-26 ENCOUNTER — APPOINTMENT (OUTPATIENT)
Dept: CT IMAGING | Facility: HOSPITAL | Age: 53
End: 2023-07-26
Payer: COMMERCIAL

## 2023-07-26 ENCOUNTER — TELEPHONE (OUTPATIENT)
Dept: FAMILY MEDICINE CLINIC | Facility: CLINIC | Age: 53
End: 2023-07-26
Payer: COMMERCIAL

## 2023-07-26 ENCOUNTER — HOSPITAL ENCOUNTER (EMERGENCY)
Facility: HOSPITAL | Age: 53
Discharge: ANOTHER HOSPITAL | End: 2023-07-26
Attending: EMERGENCY MEDICINE | Admitting: EMERGENCY MEDICINE
Payer: COMMERCIAL

## 2023-07-26 VITALS
BODY MASS INDEX: 32.58 KG/M2 | DIASTOLIC BLOOD PRESSURE: 48 MMHG | OXYGEN SATURATION: 95 % | SYSTOLIC BLOOD PRESSURE: 155 MMHG | HEIGHT: 68 IN | HEART RATE: 79 BPM | TEMPERATURE: 100.6 F | WEIGHT: 215 LBS | RESPIRATION RATE: 17 BRPM

## 2023-07-26 DIAGNOSIS — K11.8 SUBMANDIBULAR DUCT OBSTRUCTION: Primary | ICD-10-CM

## 2023-07-26 DIAGNOSIS — R22.1 NECK SWELLING: ICD-10-CM

## 2023-07-26 PROBLEM — K11.20 SIALADENITIS: Status: ACTIVE | Noted: 2023-07-26

## 2023-07-26 LAB
ALBUMIN SERPL-MCNC: 4.4 G/DL (ref 3.5–5.2)
ALBUMIN/GLOB SERPL: 1.3 G/DL
ALP SERPL-CCNC: 91 U/L (ref 39–117)
ALT SERPL W P-5'-P-CCNC: 21 U/L (ref 1–33)
ANION GAP SERPL CALCULATED.3IONS-SCNC: 12 MMOL/L (ref 5–15)
AST SERPL-CCNC: 19 U/L (ref 1–32)
BASOPHILS # BLD AUTO: 0 10*3/MM3 (ref 0–0.2)
BASOPHILS NFR BLD AUTO: 0.3 % (ref 0–1.5)
BILIRUB SERPL-MCNC: 0.5 MG/DL (ref 0–1.2)
BUN SERPL-MCNC: 11 MG/DL (ref 6–20)
BUN/CREAT SERPL: 12.9 (ref 7–25)
CALCIUM SPEC-SCNC: 9.3 MG/DL (ref 8.6–10.5)
CHLORIDE SERPL-SCNC: 100 MMOL/L (ref 98–107)
CO2 SERPL-SCNC: 26 MMOL/L (ref 22–29)
CREAT SERPL-MCNC: 0.85 MG/DL (ref 0.57–1)
D-LACTATE SERPL-SCNC: 0.8 MMOL/L (ref 0.3–2)
DEPRECATED RDW RBC AUTO: 43.8 FL (ref 37–54)
EGFRCR SERPLBLD CKD-EPI 2021: 82.6 ML/MIN/1.73
EOSINOPHIL # BLD AUTO: 0 10*3/MM3 (ref 0–0.4)
EOSINOPHIL NFR BLD AUTO: 0.2 % (ref 0.3–6.2)
ERYTHROCYTE [DISTWIDTH] IN BLOOD BY AUTOMATED COUNT: 12.7 % (ref 12.3–15.4)
GLOBULIN UR ELPH-MCNC: 3.4 GM/DL
GLUCOSE SERPL-MCNC: 113 MG/DL (ref 65–99)
HCT VFR BLD AUTO: 42.7 % (ref 34–46.6)
HGB BLD-MCNC: 14.3 G/DL (ref 12–15.9)
LYMPHOCYTES # BLD AUTO: 0.7 10*3/MM3 (ref 0.7–3.1)
LYMPHOCYTES NFR BLD AUTO: 6.3 % (ref 19.6–45.3)
MCH RBC QN AUTO: 31.4 PG (ref 26.6–33)
MCHC RBC AUTO-ENTMCNC: 33.6 G/DL (ref 31.5–35.7)
MCV RBC AUTO: 93.4 FL (ref 79–97)
MONOCYTES # BLD AUTO: 1.1 10*3/MM3 (ref 0.1–0.9)
MONOCYTES NFR BLD AUTO: 9.7 % (ref 5–12)
NEUTROPHILS NFR BLD AUTO: 83.5 % (ref 42.7–76)
NEUTROPHILS NFR BLD AUTO: 9.6 10*3/MM3 (ref 1.7–7)
NRBC BLD AUTO-RTO: 0.1 /100 WBC (ref 0–0.2)
PLATELET # BLD AUTO: 286 10*3/MM3 (ref 140–450)
PMV BLD AUTO: 8.2 FL (ref 6–12)
POTASSIUM SERPL-SCNC: 4.2 MMOL/L (ref 3.5–5.2)
PROT SERPL-MCNC: 7.8 G/DL (ref 6–8.5)
RBC # BLD AUTO: 4.57 10*6/MM3 (ref 3.77–5.28)
SODIUM SERPL-SCNC: 138 MMOL/L (ref 136–145)
WBC NRBC COR # BLD: 11.5 10*3/MM3 (ref 3.4–10.8)

## 2023-07-26 PROCEDURE — 70491 CT SOFT TISSUE NECK W/DYE: CPT

## 2023-07-26 PROCEDURE — 25510000001 IOPAMIDOL PER 1 ML: Performed by: EMERGENCY MEDICINE

## 2023-07-26 PROCEDURE — 83605 ASSAY OF LACTIC ACID: CPT

## 2023-07-26 PROCEDURE — 85025 COMPLETE CBC W/AUTO DIFF WBC: CPT | Performed by: EMERGENCY MEDICINE

## 2023-07-26 PROCEDURE — 25010000002 AMPICILLIN-SULBACTAM PER 1.5 G: Performed by: EMERGENCY MEDICINE

## 2023-07-26 PROCEDURE — 96375 TX/PRO/DX INJ NEW DRUG ADDON: CPT

## 2023-07-26 PROCEDURE — 25010000002 MORPHINE PER 10 MG: Performed by: EMERGENCY MEDICINE

## 2023-07-26 PROCEDURE — 87040 BLOOD CULTURE FOR BACTERIA: CPT | Performed by: EMERGENCY MEDICINE

## 2023-07-26 PROCEDURE — 36415 COLL VENOUS BLD VENIPUNCTURE: CPT

## 2023-07-26 PROCEDURE — 96376 TX/PRO/DX INJ SAME DRUG ADON: CPT

## 2023-07-26 PROCEDURE — 25010000002 ONDANSETRON PER 1 MG: Performed by: EMERGENCY MEDICINE

## 2023-07-26 PROCEDURE — 99284 EMERGENCY DEPT VISIT MOD MDM: CPT

## 2023-07-26 PROCEDURE — 96365 THER/PROPH/DIAG IV INF INIT: CPT

## 2023-07-26 PROCEDURE — 80053 COMPREHEN METABOLIC PANEL: CPT | Performed by: EMERGENCY MEDICINE

## 2023-07-26 RX ORDER — ONDANSETRON 2 MG/ML
4 INJECTION INTRAMUSCULAR; INTRAVENOUS ONCE
Status: COMPLETED | OUTPATIENT
Start: 2023-07-26 | End: 2023-07-26

## 2023-07-26 RX ORDER — SODIUM CHLORIDE 0.9 % (FLUSH) 0.9 %
10 SYRINGE (ML) INJECTION AS NEEDED
Status: DISCONTINUED | OUTPATIENT
Start: 2023-07-26 | End: 2023-07-26 | Stop reason: HOSPADM

## 2023-07-26 RX ORDER — AMOXICILLIN AND CLAVULANATE POTASSIUM 875; 125 MG/1; MG/1
1 TABLET, FILM COATED ORAL 2 TIMES DAILY
Qty: 20 TABLET | Refills: 0 | Status: SHIPPED | OUTPATIENT
Start: 2023-07-26

## 2023-07-26 RX ADMIN — MORPHINE SULFATE 4 MG: 4 INJECTION, SOLUTION INTRAMUSCULAR; INTRAVENOUS at 16:55

## 2023-07-26 RX ADMIN — AMPICILLIN SODIUM AND SULBACTAM SODIUM 3 G: 2; 1 INJECTION, POWDER, FOR SOLUTION INTRAMUSCULAR; INTRAVENOUS at 16:01

## 2023-07-26 RX ADMIN — SODIUM CHLORIDE, POTASSIUM CHLORIDE, SODIUM LACTATE AND CALCIUM CHLORIDE 1000 ML: 600; 310; 30; 20 INJECTION, SOLUTION INTRAVENOUS at 14:54

## 2023-07-26 RX ADMIN — IOPAMIDOL 100 ML: 755 INJECTION, SOLUTION INTRAVENOUS at 16:01

## 2023-07-26 RX ADMIN — ONDANSETRON 4 MG: 2 INJECTION INTRAMUSCULAR; INTRAVENOUS at 16:55

## 2023-07-26 RX ADMIN — MORPHINE SULFATE 4 MG: 4 INJECTION, SOLUTION INTRAMUSCULAR; INTRAVENOUS at 14:48

## 2023-07-26 NOTE — ED NOTES
Report was called to [facility], spoke with nurse Astorga regarding pt transfer for [facility]. EMTALA reviewed with patient with pt voicing understanding, EMTALA signed per pt. Pt will transfer via POV with spouse. Pt in stable condition. Pt will go with PIV in place

## 2023-07-26 NOTE — ED PROVIDER NOTES
Subjective   History of Present Illness  52-year-old female with history of Sjogren syndrome, peripheral neuropathy, fibromyalgia presents for right-sided neck, pain, swelling, redness.  Seen at PCPs office yesterday had biotic prescribed but was unable to  whenever they attempted.  Swelling got about 3 times worse.  Hurts when she tries to open her mouth or tries to swallow.  Denies any dental pain.  Radiated from just behind her right ear down into the neck.  Has had chills but no fevers.  Is allergic to sulfa antibiotics.      Review of Systems  Positive for right jaw and neck pain.  Past Medical History:   Diagnosis Date    Anxiety     Arthritis     Cluster headache     Depression     Fibromyalgia     Hypothyroidism     Peripheral neuropathy     Sjogren's syndrome        Allergies   Allergen Reactions    Sulfacetamide Rash    Butalbital-Apap-Caffeine Nausea Only    Sulfa Antibiotics Unknown - High Severity    Tricyclic Antidepressants Unknown - High Severity     need to be avoided due to risk of further Q-T prolongation       Past Surgical History:   Procedure Laterality Date    APPENDECTOMY       SECTION      CHOLECYSTECTOMY         Family History   Problem Relation Age of Onset    Thyroid disease Mother     Diabetes Mother         DMII    Heart failure Mother     Hypertension Mother     Stroke Father     Arthritis Father     Cancer Sister         bladder cancer    Colon cancer Paternal Grandfather     Breast cancer Maternal Cousin     Breast cancer Paternal Cousin        Social History     Socioeconomic History    Marital status:    Tobacco Use    Smoking status: Never     Passive exposure: Never    Smokeless tobacco: Never   Vaping Use    Vaping Use: Never used   Substance and Sexual Activity    Alcohol use: No    Drug use: Never    Sexual activity: Yes     Partners: Male     Birth control/protection: None     Comment:             Objective   Physical Exam  Constitutional:  No  "acute distress.  Head:  Atraumatic.  Normocephalic.   Eyes:  No scleral icterus. Normal conjunctivae  ENT: 2 finger trismus.  Erythema extending over angle of mandible down over mid neck.  No appreciable dental tenderness.  Cardiovascular:  Well perfused.  Equal pulses.  Regular rhythm and normal rate.  Normal capillary refill.    Pulmonary/Chest:  No respiratory distress.  Airway patent.  No tachypnea.  No accessory muscle usage.    Abdominal:  Nondistended. Nontender.   Extremities:  No peripheral edema.  No Deformity  Skin:  Warm, dry  Neurological:  Alert, awake, and appropriate.  Normal speech.      Procedures           ED Course  ED Course as of 07/26/23 1847 Wed Jul 26, 2023   1648 Patient care assumed from Dr. Bucio, expected transfer to the [facility] []   1705 Spoke with transfer center at [facility], requested images to be power shared []   1724 I spoke with Dr. Garsia, ENT, he states patient can be transferred to ED for ENT consultation in attempt to manually dislodge the obstructing stone. []   1727 Spoke with [facility] resident Valdez Dailey in the ED who agreed to accept patient for transfer. []      ED Course User Index  [] Jennifer Banks PA   /48 (BP Location: Right arm, Patient Position: Sitting)   Pulse 79   Temp (!) 100.6 °F (38.1 °C) (Oral)   Resp 17   Ht 172.7 cm (68\")   Wt 97.5 kg (215 lb)   LMP  (LMP Unknown)   SpO2 95%   BMI 32.69 kg/m²   Labs Reviewed   COMPREHENSIVE METABOLIC PANEL - Abnormal; Notable for the following components:       Result Value    Glucose 113 (*)     All other components within normal limits    Narrative:     GFR Normal >60  Chronic Kidney Disease <60  Kidney Failure <15     CBC WITH AUTO DIFFERENTIAL - Abnormal; Notable for the following components:    WBC 11.50 (*)     Neutrophil % 83.5 (*)     Lymphocyte % 6.3 (*)     Eosinophil % 0.2 (*)     Neutrophils, Absolute 9.60 (*)     Monocytes, Absolute 1.10 (*)     All other " components within normal limits   POC LACTATE - Normal   BLOOD CULTURE   BLOOD CULTURE   POC LACTATE   CBC AND DIFFERENTIAL    Narrative:     The following orders were created for panel order CBC & Differential.  Procedure                               Abnormality         Status                     ---------                               -----------         ------                     CBC Auto Differential[[id number]]        Abnormal            Final result                 Please view results for these tests on the individual orders.     Medications   sodium chloride 0.9 % flush 10 mL (has no administration in time range)   lactated ringers bolus 1,000 mL (0 mL Intravenous Stopped 7/26/23 1552)   ampicillin-sulbactam (UNASYN) 3 g in sodium chloride 0.9 % 100 mL IVPB-MBP (0 g Intravenous Stopped 7/26/23 1644)   morphine injection 4 mg (4 mg Intravenous Given 7/26/23 1448)   iopamidol (ISOVUE-370) 76 % injection 100 mL (100 mL Intravenous Given 7/26/23 1601)   morphine injection 4 mg (4 mg Intravenous Given 7/26/23 1655)   ondansetron (ZOFRAN) injection 4 mg (4 mg Intravenous Given 7/26/23 1655)     CT Soft Tissue Neck With Contrast    Result Date: 7/26/2023  1. 7 x 5 mm obstructing stone within the distal/anterior margin of the right submandibular duct, with asymmetric enlargement/inflammation of the right submandibular gland. 2. The right subglottic soft tissues appear thickened or edematous, with opacification of the piriform sinus. I suspect this is a reactive inflammatory process. Consider short-term CT neck with contrast follow-up to ensure resolution, after trial of therapy, in order to exclude underlying soft tissue mass Electronically Signed: Susannah Jaimes  7/26/2023 4:18 PM EDT  Workstation ID: [id number]                                          Medical Decision Making  Differential Dx (Includes but not limited to): Parotiditis, peritonsillar abscess, Tim's angina  Medical Records Reviewed: No pertinent  records to review  Labs: On my interpretation, CBC mild leukocytosis 11,000.  Lactate 0.8.  Imaging: On my interpretation CT soft tissue shows 7 x 5 mm obstructing stone in the distal anterior margin of the right submandibular duct with asymmetric attachment inflammation of the right submandibular gland. No evidence of Ludwigs  Telemetry: N/A  Testing considered but not ordered: CT head patient denies headache or head injury  Nature of Complaint: Acute  Admission vs Discharge: Transfer  Discussion: While in the ED IV was placed and labs were obtained appropriate PPE was worn during exam and throughout all encounters with the patient.  Patient with above evaluation.  Patient initially seen by Dr. Bucio, physical exam, HPI, orders placed per that provider.  I assumed care pending consultation with [facility] ENT and transfer.  CT imaging was reviewed by myself and Dr. Bucio.  I spoke with ENT Dr. Garsia at [facility] who agreed to accept patient for transfer to attempt to manually dislodge the stone.  Patient was given pain medication and IV Unasyn per Dr. Bucio.  I spoke with ER resident, Dr. Licona who agreed to accept patient as transfer ER to ER.  Patient transferred per POV, her  was driving, for further consultation and treatment per ENT.    Problems Addressed:  Neck swelling: acute illness or injury  Submandibular duct obstruction: acute illness or injury    Amount and/or Complexity of Data Reviewed  Labs: ordered. Decision-making details documented in ED Course.  Radiology: ordered. Decision-making details documented in ED Course.    Risk  Prescription drug management.  Parenteral controlled substances.    Concern for Tim's angina versus sialoadenitis.    Final diagnoses:   Submandibular duct obstruction   Neck swelling       ED Disposition  ED Disposition       ED Disposition   Transfer to Another Facility     Condition   --    Comment   --               No follow-up provider specified.       Medication  List      No changes were made to your prescriptions during this visit.            Jennifer Banks PA  07/26/23 [id number]

## 2023-07-26 NOTE — TELEPHONE ENCOUNTER
Patient called because she was seen yesterday and was prescribed amoxicillin. She said it wasn't sent to her pharmacy. Please send her prescription to the [facility] in [place]. Thank you.

## 2023-07-26 NOTE — DISCHARGE INSTRUCTIONS
Go directly to [facility] ED for further evaluation per ENT    Do not take out your IV or inject anything into the IV    Follow-up with ENT outpatient    Return to the ED for new or worsening symptoms

## 2023-07-31 LAB
BACTERIA SPEC AEROBE CULT: NORMAL
BACTERIA SPEC AEROBE CULT: NORMAL

## 2023-08-22 NOTE — PROGRESS NOTES
Chief Complaint  Pain Management and Anxiety    History of Present Illness  Barbara Carter presents today for pain management, insomnia, and anxiety.     Pain Management   Patient states medications are working well.   Aggravating factors include any weight bearing, going up and down stairs, turning, lifting any weight, bending, rising after sitting, running, squatting, standing, and walking  Pain scale today is 3.   Inspect done on 08/25/2023.  Patient pain contract is not up to date. Last done on 08/10/2022, updating today.   Last UDS done on 08/10/2022.        Anxiety/Depression  Associated symptoms include: depressed mood and anxiety   Severity is described per patient : Moderate  Patient states medications are  working well.     Patient had a right salivary duct stone on 7/26/2023 was seen at emergency department at [facility] she is not getting any pain relief with 2 doses of morphine and was unable to see an ENT at [facility] was transferred to [facility] emergency department where Dr. Daniel Devlin, ENT was able to move the stone.  While in the emergency department they gave her another dose of narcotic, probably Dilaudid, which finally did get some pain relief the pain was severe for the next 2 days.  She was given a small prescription, quantity #12, of hydrocodone.  She did try this but it was not as effective as Percocet or tramadol and was causing some constipation, she only took 2-3 doses and then has gone back to her normal tramadol during the day and tramadol or Percocet at night.  She states she has 37 remaining Percocet from last prescription of quantity of 90 prescribed May 22.  She is currently taking tramadol every morning and does not take a second dose unless she is taking it at bedtime instead of her nightly Percocet.    Currently taking gabapentin 300 mg 1 tablet twice most days occasionally 3 in a day.  Not certain if it is adding much to pain control, tramadol is  effective.      Current Outpatient Medications on File Prior to Visit   Medication Sig    Artificial Saliva (SALIVAMAX) pack Take 1 packet by mouth Daily. Dissolve 1 packet in 30 oz of water up to 10x daily. Swish half of solution x 1 minute, spit, then repeat.    aspirin (aspirin) 81 MG EC tablet Take 1 tablet by mouth Daily.    Carboxymethylcellul-Glycerin 0.5-0.9 % solution Apply 1 drop to eye(s) as directed by provider As Needed.    clotrimazole (MYCELEX) 10 MG reji Take 1 tablet by mouth 4 (Four) Times a Day.    conjugated estrogens (Premarin) 0.625 MG/GM vaginal cream Insert  into the vagina 2 (Two) Times a Week.    DULoxetine (Cymbalta) 60 MG capsule Take 1 capsule by mouth Daily.    fluticasone (FLONASE) 50 MCG/ACT nasal spray 2 sprays into the nostril(s) as directed by provider Daily.    gabapentin (NEURONTIN) 300 MG capsule Take 2 capsules by mouth 2 (Two) Times a Day. For pain    hydroxychloroquine (PLAQUENIL) 200 MG tablet Take 1 tablet by mouth 2 (Two) Times a Day.    levothyroxine (Synthroid) 137 MCG tablet Take 1 tablet by mouth Daily.    omeprazole (priLOSEC) 40 MG capsule Take 1 capsule by mouth Daily.    ondansetron (Zofran) 8 MG tablet Take 1 tablet by mouth Every 12 (Twelve) Hours As Needed for Nausea or Vomiting.    pilocarpine (SALAGEN) 5 MG tablet Take 1 tablet by mouth Every 8 (Eight) Hours.    SUMAtriptan (Imitrex) 100 MG tablet Take 1 at onset of Migraine, may rpt in 2 hrs. Max 2 tab in 24h or 3 days/week    vitamin B-12 (CYANOCOBALAMIN) 1000 MCG tablet Take 1 tablet by mouth Daily.    [DISCONTINUED] ARIPiprazole (ABILIFY) 2 MG tablet Take 2 tablets by mouth Daily.    [DISCONTINUED] azithromycin (Zithromax Z-Jaylen) 250 MG tablet Take 2 tablets by mouth on day 1, then 1 tablet daily on days 2-5    [DISCONTINUED] clonazePAM (KlonoPIN) 0.5 MG tablet Take 1 tablet by mouth 2 (Two) Times a Day As Needed for Anxiety.    [DISCONTINUED] ergocalciferol (ERGOCALCIFEROL) 1.25 MG (40552 UT) capsule  "Take 1 capsule by mouth 2 (Two) Times a Week.    [DISCONTINUED] traMADol (ULTRAM) 50 MG tablet Take 2 tablets by mouth Every 8 (Eight) Hours As Needed for Moderate Pain or Severe Pain.    Multiple Vitamins-Minerals (CENTRUM ADULTS) tablet Take 1 tablet by mouth Daily.    naloxone (NARCAN) 4 MG/0.1ML nasal spray 1 spray into the nostril(s) as directed by provider As Needed (narcotic overdose).    oxyCODONE-acetaminophen (Percocet) 5-325 MG per tablet Take 1 tablet by mouth Every Night. PRN severe pain    promethazine (PHENERGAN) 25 MG tablet 1-2 q6h prn, likely sedating    psyllium (Metamucil Smooth Texture) 58.6 % powder 1 teaspoon daily, increase by 1 teaspoon every 3 days to a max of 2 tablespoons    [DISCONTINUED] amoxicillin-clavulanate (AUGMENTIN) 875-125 MG per tablet Take 1 tablet by mouth 2 (Two) Times a Day.    [DISCONTINUED] amoxicillin-clavulanate (AUGMENTIN) 875-125 MG per tablet Take 1 tablet by mouth Every 12 (Twelve) Hours.    [DISCONTINUED] benzonatate (Tessalon Perles) 100 MG capsule Take 1 capsule by mouth 3 (Three) Times a Day As Needed for Cough. (Patient not taking: Reported on 7/25/2023)    [DISCONTINUED] Lidocaine Viscous HCl (XYLOCAINE) 2 % solution Take 10 mL by mouth As Needed for Moderate Pain . Dilute with salt water swish and spit up to 6 times a day (Patient not taking: Reported on 7/25/2023)     Current Facility-Administered Medications on File Prior to Visit   Medication    methylPREDNISolone acetate (DEPO-medrol) injection 60 mg       Objective   Vital Signs:   /76 (BP Location: Right arm, Patient Position: Sitting, Cuff Size: Adult)   Pulse 98   Temp 97.7 øF (36.5 øC) (Temporal)   Resp 18   Ht 172.7 cm (68\")   Wt 103 kg (227 lb)   SpO2 99% Comment: Room air  BMI 34.52 kg/mý      Patient Care Team:  Dilia Griffin MD as PCP - General (Family Medicine)     Physical Exam  Vitals and nursing note reviewed.   Constitutional:       General: She is not in acute " distress.     Appearance: Normal appearance. She is well-developed. She is obese. She is not ill-appearing.   HENT:      Head: Normocephalic and atraumatic.   Eyes:      General: No scleral icterus.     Conjunctiva/sclera: Conjunctivae normal.      Comments: Pupils are equal and round   Pulmonary:      Effort: Pulmonary effort is normal.   Skin:     General: Skin is dry.   Neurological:      Mental Status: She is alert and oriented to person, place, and time.          No visits with results within 1 Day(s) from this visit.   Latest known visit with results is:   Admission on 07/26/2023, Discharged on 07/26/2023   Component Date Value Ref Range Status    Glucose 07/26/2023 113 (H)  65 - 99 mg/dL Final    BUN 07/26/2023 11  6 - 20 mg/dL Final    Creatinine 07/26/2023 0.85  0.57 - 1.00 mg/dL Final    Sodium 07/26/2023 138  136 - 145 mmol/L Final    Potassium 07/26/2023 4.2  3.5 - 5.2 mmol/L Final    Chloride 07/26/2023 100  98 - 107 mmol/L Final    CO2 07/26/2023 26.0  22.0 - 29.0 mmol/L Final    Calcium 07/26/2023 9.3  8.6 - 10.5 mg/dL Final    Total Protein 07/26/2023 7.8  6.0 - 8.5 g/dL Final    Albumin 07/26/2023 4.4  3.5 - 5.2 g/dL Final    ALT (SGPT) 07/26/2023 21  1 - 33 U/L Final    AST (SGOT) 07/26/2023 19  1 - 32 U/L Final    Alkaline Phosphatase 07/26/2023 91  39 - 117 U/L Final    Total Bilirubin 07/26/2023 0.5  0.0 - 1.2 mg/dL Final    Globulin 07/26/2023 3.4  gm/dL Final    A/G Ratio 07/26/2023 1.3  g/dL Final    BUN/Creatinine Ratio 07/26/2023 12.9  7.0 - 25.0 Final    Anion Gap 07/26/2023 12.0  5.0 - 15.0 mmol/L Final    eGFR 07/26/2023 82.6  >60.0 mL/min/1.73 Final    Blood Culture 07/26/2023 No growth at 5 days   Final    Blood Culture 07/26/2023 No growth at 5 days   Final    WBC 07/26/2023 11.50 (H)  3.40 - 10.80 10*3/mm3 Final    RBC 07/26/2023 4.57  3.77 - 5.28 10*6/mm3 Final    Hemoglobin 07/26/2023 14.3  12.0 - 15.9 g/dL Final    Hematocrit 07/26/2023 42.7  34.0 - 46.6 % Final    MCV 07/26/2023  93.4  79.0 - 97.0 fL Final    MCH 07/26/2023 31.4  26.6 - 33.0 pg Final    MCHC 07/26/2023 33.6  31.5 - 35.7 g/dL Final    RDW 07/26/2023 12.7  12.3 - 15.4 % Final    RDW-SD 07/26/2023 43.8  37.0 - 54.0 fl Final    MPV 07/26/2023 8.2  6.0 - 12.0 fL Final    Platelets 07/26/2023 286  140 - 450 10*3/mm3 Final    Neutrophil % 07/26/2023 83.5 (H)  42.7 - 76.0 % Final    Lymphocyte % 07/26/2023 6.3 (L)  19.6 - 45.3 % Final    Monocyte % 07/26/2023 9.7  5.0 - 12.0 % Final    Eosinophil % 07/26/2023 0.2 (L)  0.3 - 6.2 % Final    Basophil % 07/26/2023 0.3  0.0 - 1.5 % Final    Neutrophils, Absolute 07/26/2023 9.60 (H)  1.70 - 7.00 10*3/mm3 Final    Lymphocytes, Absolute 07/26/2023 0.70  0.70 - 3.10 10*3/mm3 Final    Monocytes, Absolute 07/26/2023 1.10 (H)  0.10 - 0.90 10*3/mm3 Final    Eosinophils, Absolute 07/26/2023 0.00  0.00 - 0.40 10*3/mm3 Final    Basophils, Absolute 07/26/2023 0.00  0.00 - 0.20 10*3/mm3 Final    nRBC 07/26/2023 0.1  0.0 - 0.2 /100 WBC Final    Lactate 07/26/2023 0.8  0.3 - 2.0 mmol/L Final    Serial Number: [id number]:  [id number]       Lab Results   Component Value Date    CHLPL 238 (H) 12/13/2022    TRIG 198 (H) 12/13/2022    HDL 47 12/13/2022     (H) 12/13/2022     Lab Results   Component Value Date    TSH 0.573 05/22/2023     Lab Results   Component Value Date    GLUCOSE 113 (H) 07/26/2023    BUN 11 07/26/2023    CREATININE 0.85 07/26/2023    EGFRIFNONA 78 01/03/2022    EGFRIFAFRI 90 01/03/2022    BCR 12.9 07/26/2023    K 4.2 07/26/2023    CO2 26.0 07/26/2023    CALCIUM 9.3 07/26/2023    PROTENTOTREF 7.2 01/16/2023    ALBUMIN 4.4 07/26/2023    LABIL2 1.8 01/16/2023    AST 19 07/26/2023    ALT 21 07/26/2023     Lab Results   Component Value Date    WBC 11.50 (H) 07/26/2023    HGB 14.3 07/26/2023    HCT 42.7 07/26/2023    MCV 93.4 07/26/2023     07/26/2023                 CT Soft Tissue Neck With Contrast    Result Date: 7/26/2023  1. 7 x 5 mm obstructing stone within the  distal/anterior margin of the right submandibular duct, with asymmetric enlargement/inflammation of the right submandibular gland. 2. The right subglottic soft tissues appear thickened or edematous, with opacification of the piriform sinus. I suspect this is a reactive inflammatory process. Consider short-term CT neck with contrast follow-up to ensure resolution, after trial of therapy, in order to exclude underlying soft tissue mass Electronically Signed: Susannah Mcgrawgrayson  7/26/2023 4:18 PM EDT  Workstation ID: [id number]        Assessment and Plan    Diagnoses and all orders for this visit:    1. Chronic pain syndrome (Primary)  -     traMADol (ULTRAM) 50 MG tablet; Take 2 tablets by mouth Every 8 (Eight) Hours As Needed for Moderate Pain or Severe Pain.  Dispense: 270 tablet; Refill: 1    2. Encounter for drug screening  -     Drug Screen 16 w/Reflex Confirmation    3. Salivary stones    4. Cervical disc disorder at C6-C7 level with radiculopathy  -     traMADol (ULTRAM) 50 MG tablet; Take 2 tablets by mouth Every 8 (Eight) Hours As Needed for Moderate Pain or Severe Pain.  Dispense: 270 tablet; Refill: 1    5. Class 1 drug-induced obesity with serious comorbidity and body mass index (BMI) of 34.0 to 34.9 in adult  Assessment & Plan:  Patient's (Body mass index is 34.52 kg/mý.) indicates that they are obese (BMI >30) with health conditions that include dyslipidemias and GERD . Weight is unchanged. BMI  is above average; BMI management plan is completed. We discussed portion control and increasing exercise.       6. Anxiety  -     clonazePAM (KlonoPIN) 0.5 MG tablet; Take 1 tablet by mouth 2 (Two) Times a Day As Needed for Anxiety.  Dispense: 180 tablet; Refill: 1    7. Vitamin D deficiency  -     ergocalciferol (ERGOCALCIFEROL) 1.25 MG (71895 UT) capsule; Take 1 capsule by mouth 2 (Two) Times a Week.  Dispense: 24 capsule; Refill: 3    8. Neck pain  -     traMADol (ULTRAM) 50 MG tablet; Take 2 tablets by mouth Every 8  (Eight) Hours As Needed for Moderate Pain or Severe Pain.  Dispense: 270 tablet; Refill: 1    9. Fibromyalgia  -     traMADol (ULTRAM) 50 MG tablet; Take 2 tablets by mouth Every 8 (Eight) Hours As Needed for Moderate Pain or Severe Pain.  Dispense: 270 tablet; Refill: 1    10. Persistent depressive disorder  -     ARIPiprazole (ABILIFY) 2 MG tablet; Take 1 tablet by mouth Daily.  Dispense: 90 tablet; Refill: 1    Chronic pain from fibromyalgia, arthritis, and previously from radiculopathy from cervical degenerative disc disease.  I have reviewed inspect report and is as expected.  Reminded patient she should not except additional narcotic medication from other providers but under the circumstances with her severe acute pain being dosed with morphine and Dilaudid which was significantly sedating, do not review this as a true transgression of our pain management agreement.  Despite recent salivary duct stone with significant pain for about a week patient has been able to reduce nightly Percocet from every night 53 nights out of the past 3 months.   Currently she states nerve pain is minimal and has only been taking gabapentin 2 daily.  We are going to reduce this to nighttime only we are going to try to shift tramadol to be the primary pain control source taking 2 in the morning around 6 AM a second dose around 1 PM and a third dose at bedtime.  When pain is more severe she can substitute a Percocet for the 2 tramadol at bedtime.  We will reduce gabapentin to 300 mg at night increase in neuropathy/paresthesias consider discontinuing.  Checking urine drug screen at [facility] today.    Anxiety controled with Cymbalta, Abilify and Klonipin at bed time and rare use during the day but concerned about the weight gain.  Discussed changing Abilify.  Mentally patient is in a better place than she was when we increased Abilify from 2 to 4 mg, we will try to reduce back to 2 mg.    Recent obstructing salivary duct stone, requiring  emergent surgical removal.  Patient reports pain and swelling has resolved.  She is continuing Plaquenil and Salagen for treatment of Sjogren's syndrome.  She is aware to use sour foods to increase salivation.  She does have number for ENT at [facility] she can contact if symptoms recur.    Medications Discontinued During This Encounter   Medication Reason    amoxicillin-clavulanate (AUGMENTIN) 875-125 MG per tablet *Therapy completed    azithromycin (Zithromax Z-Jaylen) 250 MG tablet *Therapy completed    benzonatate (Tessalon Perles) 100 MG capsule *Therapy completed    Lidocaine Viscous HCl (XYLOCAINE) 2 % solution *Therapy completed    amoxicillin-clavulanate (AUGMENTIN) 875-125 MG per tablet *Therapy completed    ergocalciferol (ERGOCALCIFEROL) 1.25 MG (58218 UT) capsule Reorder    traMADol (ULTRAM) 50 MG tablet Reorder    clonazePAM (KlonoPIN) 0.5 MG tablet Reorder    ARIPiprazole (ABILIFY) 2 MG tablet Reorder         Follow Up     Return in about 3 months (around 11/25/2023) for pain managment or as needed.    Patient was given instructions and counseling regarding her condition or for health maintenance advice. Please see specific information pulled into the AVS if appropriate.

## 2023-08-25 ENCOUNTER — OFFICE VISIT (OUTPATIENT)
Dept: FAMILY MEDICINE CLINIC | Facility: CLINIC | Age: 53
End: 2023-08-25
Payer: COMMERCIAL

## 2023-08-25 VITALS
SYSTOLIC BLOOD PRESSURE: 126 MMHG | HEART RATE: 98 BPM | TEMPERATURE: 97.7 F | OXYGEN SATURATION: 99 % | HEIGHT: 68 IN | BODY MASS INDEX: 34.4 KG/M2 | DIASTOLIC BLOOD PRESSURE: 76 MMHG | RESPIRATION RATE: 18 BRPM | WEIGHT: 227 LBS

## 2023-08-25 DIAGNOSIS — F41.9 ANXIETY: ICD-10-CM

## 2023-08-25 DIAGNOSIS — E55.9 VITAMIN D DEFICIENCY: ICD-10-CM

## 2023-08-25 DIAGNOSIS — K11.5 SALIVARY STONES: ICD-10-CM

## 2023-08-25 DIAGNOSIS — M54.2 NECK PAIN: ICD-10-CM

## 2023-08-25 DIAGNOSIS — M79.7 FIBROMYALGIA: ICD-10-CM

## 2023-08-25 DIAGNOSIS — E66.1 CLASS 1 DRUG-INDUCED OBESITY WITH SERIOUS COMORBIDITY AND BODY MASS INDEX (BMI) OF 34.0 TO 34.9 IN ADULT: ICD-10-CM

## 2023-08-25 DIAGNOSIS — M50.123 CERVICAL DISC DISORDER AT C6-C7 LEVEL WITH RADICULOPATHY: ICD-10-CM

## 2023-08-25 DIAGNOSIS — F34.1 PERSISTENT DEPRESSIVE DISORDER: ICD-10-CM

## 2023-08-25 DIAGNOSIS — G89.4 CHRONIC PAIN SYNDROME: Primary | ICD-10-CM

## 2023-08-25 DIAGNOSIS — Z02.83 ENCOUNTER FOR DRUG SCREENING: ICD-10-CM

## 2023-08-25 RX ORDER — AMOXICILLIN AND CLAVULANATE POTASSIUM 875; 125 MG/1; MG/1
1 TABLET, FILM COATED ORAL EVERY 12 HOURS
COMMUNITY
End: 2023-08-25

## 2023-08-25 RX ORDER — TRAMADOL HYDROCHLORIDE 50 MG/1
100 TABLET ORAL EVERY 8 HOURS PRN
Qty: 270 TABLET | Refills: 1 | Status: SHIPPED | OUTPATIENT
Start: 2023-08-25

## 2023-08-25 RX ORDER — CLONAZEPAM 0.5 MG/1
0.5 TABLET ORAL 2 TIMES DAILY PRN
Qty: 180 TABLET | Refills: 1 | Status: SHIPPED | OUTPATIENT
Start: 2023-08-25

## 2023-08-25 RX ORDER — ARIPIPRAZOLE 2 MG/1
2 TABLET ORAL DAILY
Qty: 90 TABLET | Refills: 1 | Status: SHIPPED | OUTPATIENT
Start: 2023-08-25

## 2023-08-25 RX ORDER — ERGOCALCIFEROL 1.25 MG/1
1 CAPSULE ORAL 2 TIMES WEEKLY
Qty: 24 CAPSULE | Refills: 3 | Status: SHIPPED | OUTPATIENT
Start: 2023-08-28

## 2023-08-25 NOTE — ASSESSMENT & PLAN NOTE
Patient's (Body mass index is 34.52 kg/mý.) indicates that they are obese (BMI >30) with health conditions that include dyslipidemias and GERD . Weight is unchanged. BMI  is above average; BMI management plan is completed. We discussed portion control and increasing exercise.

## 2023-08-30 LAB
GABAPENTIN SERPLBLD QL SCN: POSITIVE
GABAPENTIN SERPLBLD-MCNC: 1.5 UG/ML

## 2023-09-02 LAB
AMPHETAMINES SPEC-MCNC: NEGATIVE NG/ML
BARBITURATES SERPLBLD QL: NEGATIVE UG/ML
BENZODIAZ BLD QL: NEGATIVE NG/ML
BUPRENORPHINE BLD QL SCN: NEGATIVE NG/ML
CANNABINOIDS BLD QL SCN: NEGATIVE NG/ML
CARISOPRODOL+MEPROB BLD QL SCN: NEGATIVE UG/ML
COCAINE+BZE SERPLBLD QL SCN: NEGATIVE NG/ML
FENTANYL BLD QL SCN: NEGATIVE NG/ML
GABAPENTIN SERPLBLD QL SCN: ABNORMAL UG/ML
MEPERIDINE SERPLBLD QL SCN: NEGATIVE NG/ML
METHADONE BLD QL SCN: NEGATIVE NG/ML
O-NORTRAMADOL SERPLBLD CFM-MCNC: 15.8 NG/ML
OPIATES BLD QL SCN: NEGATIVE NG/ML
OXYCODONE+OXYMORPHONE SERPLBLD QL SCN: NEGATIVE NG/ML
PCP BLD QL SCN: NEGATIVE NG/ML
PROPOXYPH BLD QL SCN: NEGATIVE NG/ML
TRAMADOL BLD QL CFM: POSITIVE
TRAMADOL BLD QL SCN: ABNORMAL NG/ML
TRAMADOL BLD-MCNC: 83.4 NG/ML

## 2023-09-20 DIAGNOSIS — F41.9 ANXIETY: ICD-10-CM

## 2023-09-20 RX ORDER — DULOXETIN HYDROCHLORIDE 60 MG/1
60 CAPSULE, DELAYED RELEASE ORAL DAILY
Qty: 90 CAPSULE | Refills: 0 | Status: SHIPPED | OUTPATIENT
Start: 2023-09-20

## 2023-10-28 DIAGNOSIS — E03.9 ACQUIRED HYPOTHYROIDISM: ICD-10-CM

## 2023-10-28 DIAGNOSIS — K21.9 GASTROESOPHAGEAL REFLUX DISEASE, UNSPECIFIED WHETHER ESOPHAGITIS PRESENT: ICD-10-CM

## 2023-10-30 RX ORDER — OMEPRAZOLE 40 MG/1
40 CAPSULE, DELAYED RELEASE ORAL DAILY
Qty: 30 CAPSULE | Refills: 5 | Status: SHIPPED | OUTPATIENT
Start: 2023-10-30

## 2023-10-30 RX ORDER — LEVOTHYROXINE SODIUM 137 UG/1
137 TABLET ORAL DAILY
Qty: 90 TABLET | Refills: 5 | Status: SHIPPED | OUTPATIENT
Start: 2023-10-30

## 2023-11-27 ENCOUNTER — TELEPHONE (OUTPATIENT)
Dept: FAMILY MEDICINE CLINIC | Facility: CLINIC | Age: 53
End: 2023-11-27
Payer: COMMERCIAL

## 2023-11-27 NOTE — TELEPHONE ENCOUNTER
Caller: Barbara Carter    Relationship to patient: Self    Best call back number: [phone]    Patient is needing: PATIENT IS SCHEDULED TO COME INTO THE OFFICE FOR MEDICATION, BUT STATES THAT SHE HAS ENOUGH MEDICATION TO GET HER THRU TILL HER INSURANCE CHANGES AFTER THE FIRST OF THE YEAR. SHE WOULD LIKE TO KNOW IF SHE NEEDS TO KEEP THE APPOINTMENT OR IF SHE IS OK TO WAIT TILL HER NEW INSURANCE TAKES EFFECT. PLEASE REACH OUT AND ADVISE.

## 2023-11-27 NOTE — TELEPHONE ENCOUNTER
I called and spoke with the patient, she voiced understanding. Cancelled her 11/28/2023 appt and rescheduled for 01/09/2024.

## 2023-11-27 NOTE — TELEPHONE ENCOUNTER
If the appointment was only for pain management and she will not need additional medications, and her anxiety, depression, and other conditions are stable and do not need to be addressed then I do not see the harm in postponing.

## 2024-01-03 NOTE — PROGRESS NOTES
Chief Complaint  Pain management    History of Present Illness  Barbara Carter presents today for pain management follow up    Pain Management   Patient states medications are working well.   Aggravating factors include any weight bearing and walking  Pain scale today is 6.   Inspect done on 01/08/2024.  Patient pain contract is up to date. Last done on 08/25/2023.  Last UDS done on 08/25/2023.    Patient states they are experiencing full body aching, it has worsened in the last 2 weeks. Even botom of feet hurt. Patient believes the cold weather has made her pain worse. Alleviating factors include a heated blanket and resting. Patient has not been taking oxycodone-acetaminophen 5-325 mg. Last RX 5/22/23 Quantity of #90 and bottle has rough count of 25 remaining. She has found significant relief with gabapentin 300 mg hs only and tramadol 50 mg 2 tablets once or twice a day. No side effects.   Do take plaquenil prescribed by rheumatology. Not taking tylenol.     Patient Care Team:  Dilia Griffin MD as PCP - General (Family Medicine)   Current Outpatient Medications on File Prior to Visit   Medication Sig    ARIPiprazole (ABILIFY) 2 MG tablet Take 1 tablet by mouth Daily.    Artificial Saliva (SALIVAMAX) pack Take 1 packet by mouth Daily. Dissolve 1 packet in 30 oz of water up to 10x daily. Swish half of solution x 1 minute, spit, then repeat.    aspirin (aspirin) 81 MG EC tablet Take 1 tablet by mouth Daily.    Carboxymethylcellul-Glycerin 0.5-0.9 % solution Apply 1 drop to eye(s) as directed by provider As Needed.    conjugated estrogens (Premarin) 0.625 MG/GM vaginal cream Insert  into the vagina 2 (Two) Times a Week.    ergocalciferol (ERGOCALCIFEROL) 1.25 MG (31856 UT) capsule Take 1 capsule by mouth 2 (Two) Times a Week.    fluticasone (FLONASE) 50 MCG/ACT nasal spray 2 sprays into the nostril(s) as directed by provider Daily.    gabapentin (NEURONTIN) 300 MG capsule Take 2 capsules by mouth 2 (Two)  Times a Day. For pain (Patient taking differently: Take 2 capsules by mouth Every Night. For pain)    hydroxychloroquine (PLAQUENIL) 200 MG tablet Take 1 tablet by mouth 2 (Two) Times a Day.    levothyroxine (SYNTHROID, LEVOTHROID) 137 MCG tablet TAKE 1 TABLET BY MOUTH EVERY DAY    Multiple Vitamins-Minerals (CENTRUM ADULTS) tablet Take 1 tablet by mouth Daily.    naloxone (NARCAN) 4 MG/0.1ML nasal spray 1 spray into the nostril(s) as directed by provider As Needed (narcotic overdose).    omeprazole (priLOSEC) 40 MG capsule TAKE 1 CAPSULE BY MOUTH EVERY DAY    ondansetron (Zofran) 8 MG tablet Take 1 tablet by mouth Every 12 (Twelve) Hours As Needed for Nausea or Vomiting.    pilocarpine (SALAGEN) 5 MG tablet Take 1 tablet by mouth Every 8 (Eight) Hours.    promethazine (PHENERGAN) 25 MG tablet 1-2 q6h prn, likely sedating    psyllium (Metamucil Smooth Texture) 58.6 % powder 1 teaspoon daily, increase by 1 teaspoon every 3 days to a max of 2 tablespoons    SUMAtriptan (Imitrex) 100 MG tablet Take 1 at onset of Migraine, may rpt in 2 hrs. Max 2 tab in 24h or 3 days/week    [DISCONTINUED] clonazePAM (KlonoPIN) 0.5 MG tablet Take 1 tablet by mouth 2 (Two) Times a Day As Needed for Anxiety.    [DISCONTINUED] DULoxetine (CYMBALTA) 60 MG capsule TAKE 1 CAPSULE BY MOUTH DAILY    [DISCONTINUED] traMADol (ULTRAM) 50 MG tablet Take 2 tablets by mouth Every 8 (Eight) Hours As Needed for Moderate Pain or Severe Pain.    [DISCONTINUED] vitamin B-12 (CYANOCOBALAMIN) 1000 MCG tablet Take 1 tablet by mouth Daily.    oxyCODONE-acetaminophen (Percocet) 5-325 MG per tablet Take 1 tablet by mouth Every Night. PRN severe pain (Patient not taking: Reported on 1/9/2024)    [DISCONTINUED] clotrimazole (MYCELEX) 10 MG reji Take 1 tablet by mouth 4 (Four) Times a Day.     Current Facility-Administered Medications on File Prior to Visit   Medication    [DISCONTINUED] methylPREDNISolone acetate (DEPO-medrol) injection 60 mg       Objective  "  Vital Signs:   /68 (BP Location: Right arm, Patient Position: Sitting, Cuff Size: Large Adult)   Pulse 60   Temp 96.4 °F (35.8 °C) (Temporal)   Resp 18   Ht 172.7 cm (68\")   Wt 104 kg (229 lb 9.6 oz)   SpO2 97%   BMI 34.91 kg/m²    BP Readings from Last 3 Encounters:   01/09/24 126/68   08/25/23 126/76   07/26/23 155/48     Wt Readings from Last 3 Encounters:   01/09/24 104 kg (229 lb 9.6 oz)   08/25/23 103 kg (227 lb)   07/26/23 97.5 kg (215 lb)         Physical Exam  Vitals and nursing note reviewed.   Constitutional:       General: She is not in acute distress.     Appearance: Normal appearance. She is well-developed. She is obese. She is not ill-appearing.   HENT:      Head: Normocephalic and atraumatic.   Eyes:      General: No scleral icterus.     Conjunctiva/sclera: Conjunctivae normal.      Comments: Pupils are equal and round   Pulmonary:      Effort: Pulmonary effort is normal.   Skin:     General: Skin is dry.   Neurological:      Mental Status: She is alert and oriented to person, place, and time.            No visits with results within 1 Day(s) from this visit.   Latest known visit with results is:   Office Visit on 08/25/2023   Component Date Value Ref Range Status    Gabapentin Confirmation 08/25/2023 Positive   Final    Gabapentin 08/25/2023 1.5  ug/mL Final    Confirmation threshold: 1.0 ug/mL    Amphetamines, IA 08/25/2023 Negative  Cutoff:50 ng/mL Final    Barbiturates, IA 08/25/2023 Negative  Cutoff:0.1 ug/mL Final    Benzodiazepines, IA 08/25/2023 Negative  Cutoff:20 ng/mL Final    COCAINE / METABOLITE, IA 08/25/2023 Negative  Cutoff:25 ng/mL Final    PHENCYCLIDINE, IA 08/25/2023 Negative  Cutoff:8 ng/mL Final    THC (marijuana) Mtb 08/25/2023 Negative  Cutoff:5 ng/mL Final    OPIATES, IA 08/25/2023 Negative  Cutoff:5 ng/mL Final    OXYCODONES, IA 08/25/2023 Negative  Cutoff:5 ng/mL Final    METHADONE, IA 08/25/2023 Negative  Cutoff:25 ng/mL Final    FENTANYL, IA 08/25/2023 " Negative  Cutoff:1.0 ng/mL Final    Buprenorphine, Screen, Urine 08/25/2023 Negative  Cutoff:1.0 ng/mL Final    PROPOXYPHENE, IA 08/25/2023 Negative  Cutoff:50 ng/mL Final    MEPERIDINE, IA 08/25/2023 Negative  Cutoff:100 ng/mL Final    TRAMADOL, IA 08/25/2023 ++POSITIVE++ (A)  Cutoff:50 ng/mL Final    Gabapentin, IA 08/25/2023 ++POSITIVE++ (A)  Cutoff:1.0 ug/mL Final    Carisoprodol, IA 08/25/2023 Negative  Cutoff:0.5 ug/mL Final    Comment: This test was developed and its performance characteristics  determined by [facility].  It has not been cleared or approved  by the Food and Drug Administration.      Tramadol Confirmation 08/25/2023 Positive   Final    Tramadol 08/25/2023 83.4  ng/mL Final    O-Desmethyltramadol 08/25/2023 15.8  ng/mL Final    Confirmation threshold: 10 ng/mL       Lab Results   Component Value Date    CHLPL 238 (H) 12/13/2022    TRIG 198 (H) 12/13/2022    HDL 47 12/13/2022     (H) 12/13/2022     Lab Results   Component Value Date    TSH 0.573 05/22/2023     Lab Results   Component Value Date    GLUCOSE 113 (H) 07/26/2023    BUN 11 07/26/2023    CREATININE 0.85 07/26/2023    EGFRIFNONA 78 01/03/2022    EGFRIFAFRI 90 01/03/2022    BCR 12.9 07/26/2023    K 4.2 07/26/2023    CO2 26.0 07/26/2023    CALCIUM 9.3 07/26/2023    PROTENTOTREF 7.2 01/16/2023    ALBUMIN 4.4 07/26/2023    LABIL2 1.8 01/16/2023    AST 19 07/26/2023    ALT 21 07/26/2023     Lab Results   Component Value Date    WBC 11.50 (H) 07/26/2023    HGB 14.3 07/26/2023    HCT 42.7 07/26/2023    MCV 93.4 07/26/2023     07/26/2023                      Assessment and Plan    Diagnoses and all orders for this visit:    1. Chronic pain syndrome (Primary)  -     traMADol (ULTRAM) 50 MG tablet; Take 2 tablets by mouth Every 8 (Eight) Hours As Needed for Moderate Pain or Severe Pain.  Dispense: 270 tablet; Refill: 1  -     acetaminophen (TYLENOL) 650 MG 8 hr tablet; Take 2 tablets by mouth Every 8 (Eight) Hours As Needed for Mild  Pain, Moderate Pain or Headache.    2. Cervical disc disorder at C6-C7 level with radiculopathy  -     traMADol (ULTRAM) 50 MG tablet; Take 2 tablets by mouth Every 8 (Eight) Hours As Needed for Moderate Pain or Severe Pain.  Dispense: 270 tablet; Refill: 1    3. Neck pain  -     traMADol (ULTRAM) 50 MG tablet; Take 2 tablets by mouth Every 8 (Eight) Hours As Needed for Moderate Pain or Severe Pain.  Dispense: 270 tablet; Refill: 1  -     acetaminophen (TYLENOL) 650 MG 8 hr tablet; Take 2 tablets by mouth Every 8 (Eight) Hours As Needed for Mild Pain, Moderate Pain or Headache.    4. Fibromyalgia  -     traMADol (ULTRAM) 50 MG tablet; Take 2 tablets by mouth Every 8 (Eight) Hours As Needed for Moderate Pain or Severe Pain.  Dispense: 270 tablet; Refill: 1  -     acetaminophen (TYLENOL) 650 MG 8 hr tablet; Take 2 tablets by mouth Every 8 (Eight) Hours As Needed for Mild Pain, Moderate Pain or Headache.    5. Anxiety  -     DULoxetine (CYMBALTA) 60 MG capsule; Take 1 capsule by mouth Daily.  Dispense: 90 capsule; Refill: 0  -     clonazePAM (KlonoPIN) 0.5 MG tablet; Take 1 tablet by mouth 2 (Two) Times a Day As Needed for Anxiety.  Dispense: 180 tablet; Refill: 1    6. B12 deficiency  -     vitamin B-12 (CYANOCOBALAMIN) 1000 MCG tablet; Take 1 tablet by mouth Daily.  Dispense: 90 tablet; Refill: 3    7. Class 1 drug-induced obesity with serious comorbidity and body mass index (BMI) of 34.0 to 34.9 in adult    8. Need for influenza vaccination  -     Fluzone (or Fluarix & Flulaval for VFC) >6mos    9. Sjogren's syndrome, with unspecified organ involvement      Rheumatologic disorder including Sjogren's syndrome managed by Brenda Fenton, practitioner with Dr. Parada in [place].  She is currently having a pain flare.  She has been diligently working on reducing dependence on narcotic and is not currently taking any oxycodone and limiting tramadol typically to 2 daily but recently has been taking 4 daily fairly  consistently.  Advised she can add Tylenol arthritis 3 times daily.  She declines steroids due to side effects.  If this does not appropriately manage pain would recommend scheduling with rheumatology.    Depression and anxiety currently taking Klonopin at bedtime every night and occasionally a daytime dose.  Symptoms are stable.  Continue Cymbalta and Klonopin.  B12 deficiency renewing cyanocobalamin    Flu shot today, do recommend Shingrix and COVID vaccinations.  Patient is due for colorectal cancer screening, declines referral today.  Will have her return for an annual exam to discuss in further detail.    Medications Discontinued During This Encounter   Medication Reason    methylPREDNISolone acetate (DEPO-medrol) injection 60 mg *Therapy completed    clotrimazole (MYCELEX) 10 MG reji *Therapy completed    vitamin B-12 (CYANOCOBALAMIN) 1000 MCG tablet Reorder    clonazePAM (KlonoPIN) 0.5 MG tablet Reorder    traMADol (ULTRAM) 50 MG tablet Reorder    DULoxetine (CYMBALTA) 60 MG capsule Reorder         Follow Up     Return in about 4 weeks (around 2/6/2024) for Annual physical.    Patient was given instructions and counseling regarding her condition or for health maintenance advice. Please see specific information pulled into the AVS if appropriate.

## 2024-01-09 ENCOUNTER — OFFICE VISIT (OUTPATIENT)
Dept: FAMILY MEDICINE CLINIC | Facility: CLINIC | Age: 54
End: 2024-01-09
Payer: COMMERCIAL

## 2024-01-09 VITALS
DIASTOLIC BLOOD PRESSURE: 68 MMHG | HEIGHT: 68 IN | SYSTOLIC BLOOD PRESSURE: 126 MMHG | OXYGEN SATURATION: 97 % | TEMPERATURE: 96.4 F | RESPIRATION RATE: 18 BRPM | HEART RATE: 60 BPM | WEIGHT: 229.6 LBS | BODY MASS INDEX: 34.8 KG/M2

## 2024-01-09 DIAGNOSIS — G89.4 CHRONIC PAIN SYNDROME: Primary | ICD-10-CM

## 2024-01-09 DIAGNOSIS — E66.1 CLASS 1 DRUG-INDUCED OBESITY WITH SERIOUS COMORBIDITY AND BODY MASS INDEX (BMI) OF 34.0 TO 34.9 IN ADULT: ICD-10-CM

## 2024-01-09 DIAGNOSIS — M35.00 SJOGREN'S SYNDROME, WITH UNSPECIFIED ORGAN INVOLVEMENT: ICD-10-CM

## 2024-01-09 DIAGNOSIS — M79.7 FIBROMYALGIA: ICD-10-CM

## 2024-01-09 DIAGNOSIS — M50.123 CERVICAL DISC DISORDER AT C6-C7 LEVEL WITH RADICULOPATHY: ICD-10-CM

## 2024-01-09 DIAGNOSIS — E53.8 B12 DEFICIENCY: ICD-10-CM

## 2024-01-09 DIAGNOSIS — M54.2 NECK PAIN: ICD-10-CM

## 2024-01-09 DIAGNOSIS — F41.9 ANXIETY: ICD-10-CM

## 2024-01-09 DIAGNOSIS — Z23 NEED FOR INFLUENZA VACCINATION: ICD-10-CM

## 2024-01-09 PROCEDURE — 90471 IMMUNIZATION ADMIN: CPT | Performed by: FAMILY MEDICINE

## 2024-01-09 PROCEDURE — 90686 IIV4 VACC NO PRSV 0.5 ML IM: CPT | Performed by: FAMILY MEDICINE

## 2024-01-09 PROCEDURE — 99214 OFFICE O/P EST MOD 30 MIN: CPT | Performed by: FAMILY MEDICINE

## 2024-01-09 RX ORDER — DULOXETIN HYDROCHLORIDE 60 MG/1
60 CAPSULE, DELAYED RELEASE ORAL DAILY
Qty: 90 CAPSULE | Refills: 0 | Status: SHIPPED | OUTPATIENT
Start: 2024-01-09

## 2024-01-09 RX ORDER — TRAMADOL HYDROCHLORIDE 50 MG/1
100 TABLET ORAL EVERY 8 HOURS PRN
Qty: 270 TABLET | Refills: 1 | Status: SHIPPED | OUTPATIENT
Start: 2024-01-09

## 2024-01-09 RX ORDER — CLONAZEPAM 0.5 MG/1
0.5 TABLET ORAL 2 TIMES DAILY PRN
Qty: 180 TABLET | Refills: 1 | Status: SHIPPED | OUTPATIENT
Start: 2024-01-09

## 2024-01-09 RX ORDER — LANOLIN ALCOHOL/MO/W.PET/CERES
1000 CREAM (GRAM) TOPICAL DAILY
Qty: 90 TABLET | Refills: 3 | Status: SHIPPED | OUTPATIENT
Start: 2024-01-09

## 2024-01-09 RX ORDER — SENNOSIDES 8.6 MG
1300 CAPSULE ORAL EVERY 8 HOURS PRN
Start: 2024-01-09

## 2024-01-10 DIAGNOSIS — M50.123 CERVICAL DISC DISORDER AT C6-C7 LEVEL WITH RADICULOPATHY: ICD-10-CM

## 2024-01-10 RX ORDER — GABAPENTIN 300 MG/1
600 CAPSULE ORAL NIGHTLY
Qty: 180 CAPSULE | Refills: 3 | Status: SHIPPED | OUTPATIENT
Start: 2024-01-10

## 2024-02-02 NOTE — PROGRESS NOTES
Chief Complaint   Patient presents with    Annual Exam   Pain management, follow-up on chronic medical concerns.    History of Present Illness  Subjective   Barbara Carter is a 53 y.o. female.       The patient is here: for coordination of medical care.  Last comprehensive physical was on 02/06/2023.  Previous physical was performed by  Dilia Griffin MD  Overall has: moderate activity with work/home activities, good appetite, feels well with minor complaints, decreased energy level, and is sleeping well. Nutrition: balanced diet. Last tetanus shot was  08/24/2019 . Patient is doing routine self breast exams: occasionally    Have had increase in pain in past 2 weeks, more aching in legs  Have started tylenol arthritis but not consistantly.  Continuing tramadol nightly and occasionally in am.   And Cymbalta and gabapentin.  She is not currently seeing rheumatology, has seen rheumatology in the past but quit going because driving to [place] was not convenient    Health Maintenance   Topic Date Due    ZOSTER VACCINE (1 of 2) Never done    COVID-19 Vaccine (3 - 2023-24 season) 09/01/2023    COLORECTAL CANCER SCREENING  12/29/2023    ANNUAL PHYSICAL  02/06/2024    LIPID PANEL  02/01/2025 (Originally 12/13/2023)    PAP SMEAR  07/31/2024    BMI FOLLOWUP  08/25/2024    MAMMOGRAM  01/26/2025    TDAP/TD VACCINES (2 - Td or Tdap) 08/24/2029    HEPATITIS C SCREENING  Completed    INFLUENZA VACCINE  Completed    Pneumococcal Vaccine 0-64  Aged Out       PHQ-9 Depression Screening  Little interest or pleasure in doing things? 0-->not at all   Feeling down, depressed, or hopeless? 0-->not at all   Trouble falling or staying asleep, or sleeping too much?     Feeling tired or having little energy?     Poor appetite or overeating?     Feeling bad about yourself - or that you are a failure or have let yourself or your family down?     Trouble concentrating on things, such as reading the newspaper or watching  television?     Moving or speaking so slowly that other people could have noticed? Or the opposite - being so fidgety or restless that you have been moving around a lot more than usual?     Thoughts that you would be better off dead, or of hurting yourself in some way?     PHQ-9 Total Score 0   If you checked off any problems, how difficult have these problems made it for you to do your work, take care of things at home, or get along with other people?             Recent Hospitalizations:  No hospitalization(s) within the last year..  ccc      I personally reviewed and updated the patient's allergies, medications, problem list, and past medical, surgical, social, and family history. I have reviewed and confirmed the accuracy of the HPI and ROS as documented by the MA/LPN/RN Dilia Griffin MD    Family History   Problem Relation Age of Onset    Thyroid disease Mother     Diabetes Mother         DMII    Heart failure Mother     Hypertension Mother     Stroke Father     Arthritis Father     Cancer Sister         bladder cancer    Colon cancer Paternal Grandfather     Breast cancer Maternal Cousin     Breast cancer Paternal Cousin        Social History     Tobacco Use    Smoking status: Never     Passive exposure: Never    Smokeless tobacco: Never   Vaping Use    Vaping Use: Never used   Substance Use Topics    Alcohol use: No    Drug use: Never       Past Surgical History:   Procedure Laterality Date    APPENDECTOMY       SECTION      CHOLECYSTECTOMY         Patient Active Problem List   Diagnosis    Cervical lymphadenopathy    Fibromyalgia    Chronic headache    Gastroesophageal reflux disease    Hyperlipidemia    Hypothyroidism    Polyarthralgia    Prolonged QT interval    Sjogren's syndrome    Tricuspid valve regurgitation    Vitamin D deficiency    Lymphadenopathy    Cervical disc disorder at C6-C7 level with radiculopathy    Neck pain    Chronic bilateral low back pain without sciatica    Chronic  pain syndrome    Anxiety    Other acute recurrent sinusitis    Amenorrhea    Allergic rhinitis    Abnormal computed tomography scan    Acute mesenteric adenitis    Post-COVID-19 syndrome    Nausea    Mucositis    Nasal burning    Acute otalgia, left    TMJ (sprain of temporomandibular joint)    Dysuria    Complaints of total body pain    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 Dissolve 1 packet in 30 oz of water up to 10x daily. Swish half of solution x 1 minute, spit, then repeat., Disp: , Rfl:     aspirin (aspirin) 81 MG EC tablet, Take 1 tablet by mouth Daily., Disp: , Rfl:     Carboxymethylcellul-Glycerin 0.5-0.9 % solution, Apply 1 drop to eye(s) as directed by provider As Needed., Disp: , Rfl:     clonazePAM (KlonoPIN) 0.5 MG tablet, Take 1 tablet by mouth 2 (Two) Times a Day As Needed for Anxiety., Disp: 180 tablet, Rfl: 1    conjugated estrogens (Premarin) 0.625 MG/GM vaginal cream, Insert  into the vagina 2 (Two) Times a Week., Disp: 30 g, Rfl: 12    DULoxetine (CYMBALTA) 60 MG capsule, Take 1 capsule by mouth Daily., Disp: 90 capsule, Rfl: 0    ergocalciferol (ERGOCALCIFEROL) 1.25 MG (52738 UT) capsule, Take 1 capsule by mouth 2 (Two) Times a Week., Disp: 24 capsule, Rfl: 3    fluticasone (FLONASE) 50 MCG/ACT nasal spray, 2 sprays into the nostril(s) as directed by provider Daily.,  Disp: , Rfl:     gabapentin (NEURONTIN) 300 MG capsule, Take 2 capsules by mouth Every Night. For pain, Disp: 180 capsule, Rfl: 3    hydroxychloroquine (PLAQUENIL) 200 MG tablet, Take 1 tablet by mouth 2 (Two) Times a Day., Disp: , Rfl:     levothyroxine (SYNTHROID, LEVOTHROID) 137 MCG tablet, TAKE 1 TABLET BY MOUTH EVERY DAY, Disp: 90 tablet, Rfl: 5    Multiple Vitamins-Minerals (CENTRUM ADULTS) tablet, Take 1 tablet by mouth Daily., Disp: , Rfl:     naloxone (NARCAN) 4 MG/0.1ML nasal spray, 1 spray into the nostril(s) as directed by provider As Needed (narcotic overdose)., Disp: 1 each, Rfl: 1    omeprazole (priLOSEC) 40 MG capsule, TAKE 1 CAPSULE BY MOUTH EVERY DAY, Disp: 30 capsule, Rfl: 5    ondansetron (Zofran) 8 MG tablet, Take 1 tablet by mouth Every 12 (Twelve) Hours As Needed for Nausea or Vomiting., Disp: 30 tablet, Rfl: 3    pilocarpine (SALAGEN) 5 MG tablet, Take 1 tablet by mouth Every 8 (Eight) Hours., Disp: , Rfl:     promethazine (PHENERGAN) 25 MG tablet, 1-2 q6h prn, likely sedating, Disp: 20 tablet, Rfl: 5    psyllium (Metamucil Smooth Texture) 58.6 % powder, 1 teaspoon daily, increase by 1 teaspoon every 3 days to a max of 2 tablespoons, Disp: 425 g, Rfl: 12    SUMAtriptan (Imitrex) 100 MG tablet, Take 1 at onset of Migraine, may rpt in 2 hrs. Max 2 tab in 24h or 3 days/week, Disp: 18 tablet, Rfl: 11    traMADol (ULTRAM) 50 MG tablet, Take 2 tablets by mouth Every 8 (Eight) Hours As Needed for Moderate Pain or Severe Pain., Disp: 270 tablet, Rfl: 1    vitamin B-12 (CYANOCOBALAMIN) 1000 MCG tablet, Take 1 tablet by mouth Daily., Disp: 90 tablet, Rfl: 3    oxyCODONE-acetaminophen (Percocet) 5-325 MG per tablet, Take 1 tablet by mouth Every Night. PRN severe pain (Patient not taking: Reported on 1/9/2024), Disp: 90 tablet, Rfl: 0    Objective   /76 (BP Location: Right arm, Patient Position: Sitting, Cuff Size: Large Adult)   Pulse 66   Temp 98.4 °F (36.9 °C) (Temporal)   Resp 18   Ht 171.5  "cm (67.5\")   Wt 104 kg (228 lb 9.6 oz)   LMP  (LMP Unknown)   SpO2 97%   BMI 35.28 kg/m²   BP Readings from Last 3 Encounters:   02/07/24 118/76   01/09/24 126/68   08/25/23 126/76     Wt Readings from Last 3 Encounters:   02/07/24 104 kg (228 lb 9.6 oz)   01/09/24 104 kg (229 lb 9.6 oz)   08/25/23 103 kg (227 lb)     Physical Exam  Constitutional:       General: She is not in acute distress.     Appearance: She is well-developed.   HENT:      Head: Normocephalic and atraumatic.      Right Ear: External ear normal.      Left Ear: External ear normal.   Eyes:      Conjunctiva/sclera: Conjunctivae normal.      Pupils: Pupils are equal, round, and reactive to light.   Neck:      Thyroid: No thyromegaly.      Trachea: No tracheal deviation.   Cardiovascular:      Rate and Rhythm: Normal rate and regular rhythm.      Pulses: Normal pulses.      Heart sounds: Normal heart sounds. No murmur heard.     No friction rub. No gallop.   Pulmonary:      Effort: Pulmonary effort is normal. No respiratory distress.      Breath sounds: Normal breath sounds. No wheezing or rales.      Comments: Breasts: breasts appear normal, no suspicious masses, no skin or nipple changes or axillary nodes.    Chest:      Chest wall: No tenderness.   Abdominal:      General: Bowel sounds are normal.      Palpations: Abdomen is soft.      Tenderness: There is no abdominal tenderness.   Genitourinary:     Comments: Pelvic exam deferred, Pap is due in July  Musculoskeletal:      Cervical back: Normal range of motion and neck supple.   Lymphadenopathy:      Cervical: No cervical adenopathy.   Skin:     General: Skin is warm and dry.      Findings: No rash.   Neurological:      Mental Status: She is alert and oriented to person, place, and time.   Psychiatric:         Behavior: Behavior normal.         Data / Lab Results:  Hemoglobin A1C   Date Value Ref Range Status   02/07/2024 5.8 (H) 4.8 - 5.6 % Final     Comment:              Prediabetes: 5.7 - " 6.4           Diabetes: >6.4           Glycemic control for adults with diabetes: <7.0       Common labs          4/26/2023    10:38 7/26/2023    14:44 2/7/2024    12:00   Common Labs   Glucose  113  90    BUN  11  11    Creatinine 1.10  0.85  0.94    Sodium  138  140    Potassium  4.2  5.0    Chloride  100  100    Calcium  9.3  9.8    Total Protein   7.7    Albumin  4.4  4.7    Total Bilirubin  0.5  0.3    Alkaline Phosphatase  91  74    AST (SGOT)  19  29    ALT (SGPT)  21  32    WBC  11.50  4.4    Hemoglobin  14.3  14.9    Hematocrit  42.7  43.9    Platelets  286  307    Hemoglobin A1C   5.8      The 10-year ASCVD risk score (Ileana ROMO, et al., 2019) is: 2%    Values used to calculate the score:      Age: 53 years      Sex: Female      Is Non- : No      Diabetic: No      Tobacco smoker: No      Systolic Blood Pressure: 118 mmHg      Is BP treated: No      HDL Cholesterol: 47 mg/dL      Total Cholesterol: 238 mg/dL    Age-appropriate Screening Schedule:  Refer to the list below for future screening recommendations based on patient's age, sex and/or medical conditions. Orders for these recommended tests are listed in the plan section. The patient has been provided with a written plan.        Assessment & Plan      Medications        Problem List         LOS        Diagnoses and all orders for this visit:    1. Annual physical exam (Primary)    2. BMI 35.0-35.9,adult    3. Screening for colon cancer    4. Adenomatous polyp  -     Ambulatory Referral to Gastroenterology    5. Family history of colon cancer  -     Ambulatory Referral to Gastroenterology    6. Sjogren's syndrome, with unspecified organ involvement  -     Ambulatory Referral to Rheumatology    7. Fibromyalgia  -     Ambulatory Referral to Rheumatology    8. Menopause  -     DEXA Bone Density Axial; Future    9. Screening for cholesterol level    10. Screening for diabetes mellitus  -     Hemoglobin A1c  -     Comprehensive  Metabolic Panel    11. Encounter for screening mammogram for breast cancer  -     Mammo Screening Digital Tomosynthesis Bilateral With CAD; Future    12. Acquired hypothyroidism  -     TSH  -     T4, Free    13. Vitamin D deficiency  -     Vitamin D,25-Hydroxy    14. B12 deficiency  -     Vitamin B12  -     CBC & Differential        The patient was counseled regarding nutrition, physical activity, healthy weight, injury prevention, immunizations and preventative health screenings.  Recommend Shingrix and COVID19, pt declines at this time.  Ordering mammogram and DEXA scan, screening labs as above as well as labs to follow chronic medical conditions.    Referring for colonoscopy. Previous colonoscopy, with Dr. Sykes ([facility]) 12/12/2012 did find a tubular adenoma at the hepatic flexure.  Patient's paternal grandfather did have history of colon cancer    Fibromyalgia and Sjogren's syndrome.  Patient has not kept follow-up with rheumatology referring to rheumatology in [place] for reevaluation and discuss ongoing treatment.  Return in about 3 months (around 5/7/2024) for pain managment.    Expected course, medications, and adverse effects discussed.    Call or return if worsening or persistent symptoms.   Hand hygiene was performed before and after exam.   The complete contents of the Assessment and Plan and Data / Lab Results as documented above have been reviewed and addressed by myself with the patient today as part of an ongoing evaluation / treatment plan.    If separate E/M service has been provided today it was significant, medically necessary, and separately identifiable.    Return in about 3 months (around 5/7/2024) for pain managment.    There are no discontinued medications.

## 2024-02-07 ENCOUNTER — OFFICE VISIT (OUTPATIENT)
Dept: FAMILY MEDICINE CLINIC | Facility: CLINIC | Age: 54
End: 2024-02-07
Payer: COMMERCIAL

## 2024-02-07 VITALS
TEMPERATURE: 98.4 F | SYSTOLIC BLOOD PRESSURE: 118 MMHG | HEIGHT: 68 IN | BODY MASS INDEX: 34.65 KG/M2 | OXYGEN SATURATION: 97 % | RESPIRATION RATE: 18 BRPM | WEIGHT: 228.6 LBS | DIASTOLIC BLOOD PRESSURE: 76 MMHG | HEART RATE: 66 BPM

## 2024-02-07 DIAGNOSIS — Z13.1 SCREENING FOR DIABETES MELLITUS: ICD-10-CM

## 2024-02-07 DIAGNOSIS — Z78.0 MENOPAUSE: ICD-10-CM

## 2024-02-07 DIAGNOSIS — Z80.0 FAMILY HISTORY OF COLON CANCER: ICD-10-CM

## 2024-02-07 DIAGNOSIS — E03.9 ACQUIRED HYPOTHYROIDISM: ICD-10-CM

## 2024-02-07 DIAGNOSIS — D36.9 ADENOMATOUS POLYP: ICD-10-CM

## 2024-02-07 DIAGNOSIS — Z12.11 SCREENING FOR COLON CANCER: ICD-10-CM

## 2024-02-07 DIAGNOSIS — Z00.00 ANNUAL PHYSICAL EXAM: Primary | ICD-10-CM

## 2024-02-07 DIAGNOSIS — M35.00 SJOGREN'S SYNDROME, WITH UNSPECIFIED ORGAN INVOLVEMENT: ICD-10-CM

## 2024-02-07 DIAGNOSIS — E55.9 VITAMIN D DEFICIENCY: ICD-10-CM

## 2024-02-07 DIAGNOSIS — E53.8 B12 DEFICIENCY: ICD-10-CM

## 2024-02-07 DIAGNOSIS — Z12.31 ENCOUNTER FOR SCREENING MAMMOGRAM FOR BREAST CANCER: ICD-10-CM

## 2024-02-07 DIAGNOSIS — Z13.220 SCREENING FOR CHOLESTEROL LEVEL: ICD-10-CM

## 2024-02-07 DIAGNOSIS — M79.7 FIBROMYALGIA: ICD-10-CM

## 2024-02-08 LAB
25(OH)D3+25(OH)D2 SERPL-MCNC: 52.2 NG/ML (ref 30–100)
ALBUMIN SERPL-MCNC: 4.7 G/DL (ref 3.8–4.9)
ALBUMIN/GLOB SERPL: 1.6 {RATIO} (ref 1.2–2.2)
ALP SERPL-CCNC: 74 IU/L (ref 44–121)
ALT SERPL-CCNC: 32 IU/L (ref 0–32)
AST SERPL-CCNC: 29 IU/L (ref 0–40)
BASOPHILS # BLD AUTO: 0.1 X10E3/UL (ref 0–0.2)
BASOPHILS NFR BLD AUTO: 1 %
BILIRUB SERPL-MCNC: 0.3 MG/DL (ref 0–1.2)
BUN SERPL-MCNC: 11 MG/DL (ref 6–24)
BUN/CREAT SERPL: 12 (ref 9–23)
CALCIUM SERPL-MCNC: 9.8 MG/DL (ref 8.7–10.2)
CHLORIDE SERPL-SCNC: 100 MMOL/L (ref 96–106)
CO2 SERPL-SCNC: 27 MMOL/L (ref 20–29)
CREAT SERPL-MCNC: 0.94 MG/DL (ref 0.57–1)
EGFRCR SERPLBLD CKD-EPI 2021: 73 ML/MIN/1.73
EOSINOPHIL # BLD AUTO: 0.1 X10E3/UL (ref 0–0.4)
EOSINOPHIL NFR BLD AUTO: 3 %
ERYTHROCYTE [DISTWIDTH] IN BLOOD BY AUTOMATED COUNT: 12.2 % (ref 11.7–15.4)
GLOBULIN SER CALC-MCNC: 3 G/DL (ref 1.5–4.5)
GLUCOSE SERPL-MCNC: 90 MG/DL (ref 70–99)
HBA1C MFR BLD: 5.8 % (ref 4.8–5.6)
HCT VFR BLD AUTO: 43.9 % (ref 34–46.6)
HGB BLD-MCNC: 14.9 G/DL (ref 11.1–15.9)
IMM GRANULOCYTES # BLD AUTO: 0 X10E3/UL (ref 0–0.1)
IMM GRANULOCYTES NFR BLD AUTO: 0 %
LYMPHOCYTES # BLD AUTO: 1.3 X10E3/UL (ref 0.7–3.1)
LYMPHOCYTES NFR BLD AUTO: 30 %
MCH RBC QN AUTO: 31.2 PG (ref 26.6–33)
MCHC RBC AUTO-ENTMCNC: 33.9 G/DL (ref 31.5–35.7)
MCV RBC AUTO: 92 FL (ref 79–97)
MONOCYTES # BLD AUTO: 0.6 X10E3/UL (ref 0.1–0.9)
MONOCYTES NFR BLD AUTO: 13 %
NEUTROPHILS # BLD AUTO: 2.4 X10E3/UL (ref 1.4–7)
NEUTROPHILS NFR BLD AUTO: 53 %
PLATELET # BLD AUTO: 307 X10E3/UL (ref 150–450)
POTASSIUM SERPL-SCNC: 5 MMOL/L (ref 3.5–5.2)
PROT SERPL-MCNC: 7.7 G/DL (ref 6–8.5)
RBC # BLD AUTO: 4.78 X10E6/UL (ref 3.77–5.28)
SODIUM SERPL-SCNC: 140 MMOL/L (ref 134–144)
T4 FREE SERPL-MCNC: 1.18 NG/DL (ref 0.82–1.77)
TSH SERPL DL<=0.005 MIU/L-ACNC: 2.05 UIU/ML (ref 0.45–4.5)
VIT B12 SERPL-MCNC: 1370 PG/ML (ref 232–1245)
WBC # BLD AUTO: 4.4 X10E3/UL (ref 3.4–10.8)

## 2024-02-25 NOTE — PROGRESS NOTES
I have sent the following message to patient through [employer] please ask how she would like to reduce when B12 and update med list or send me prescription request:  Barbara,  Thyroid, vitamin D, CMP, and CBC all completely within normal limits.  A1c is slightly elevated at 5.8, this is low end of prediabetes, do recommend increase physical activity and weight reduction as well as a low concentrated sweets diet.  B12 level is just above normal limit.  Assuming you are taking the B12 1000 mcg every day we can reduce this to every other day or reduced to a 500 mcg tablet daily.  If you want me to send this in as a prescription let me know.

## 2024-03-06 ENCOUNTER — HOSPITAL ENCOUNTER (OUTPATIENT)
Dept: BONE DENSITY | Facility: HOSPITAL | Age: 54
Discharge: HOME | End: 2024-03-06
Payer: COMMERCIAL

## 2024-03-06 ENCOUNTER — HOSPITAL ENCOUNTER (OUTPATIENT)
Dept: MAMMOGRAPHY | Facility: HOSPITAL | Age: 54
Discharge: HOME | End: 2024-03-06
Payer: COMMERCIAL

## 2024-03-06 DIAGNOSIS — Z12.31 ENCOUNTER FOR SCREENING MAMMOGRAM FOR BREAST CANCER: ICD-10-CM

## 2024-03-06 DIAGNOSIS — Z78.0 MENOPAUSE: ICD-10-CM

## 2024-03-06 PROCEDURE — 77063 BREAST TOMOSYNTHESIS BI: CPT

## 2024-03-06 PROCEDURE — 77080 DXA BONE DENSITY AXIAL: CPT

## 2024-03-06 PROCEDURE — 77067 SCR MAMMO BI INCL CAD: CPT

## 2024-05-07 NOTE — PROGRESS NOTES
Answers submitted by the patient for this visit:  Other (Submitted on 5/7/2024)  Please describe your symptoms.: Pain management  Have you had these symptoms before?: Yes  How long have you been having these symptoms?: Greater than 2 weeks  Please list any medications you are currently taking for this condition.: Tramadol, Gabepentin  Please describe any probable cause for these symptoms. : Fibromyalgia , Sjogrens  Primary Reason for Visit (Submitted on 5/7/2024)  What is the primary reason for your visit?: Other  Chief Complaint  Pain management     History of Present Illness  Barbara Carter presents today for follow up on pain management      Pain Management   Patient states medications are working well.   Aggravating factors include going up and down stairs and walking  Pain scale today is 3.   Inspect done on 05/07/2024.  Patient pain contract is up to date. Last done on 08/25/2023.  Last UDS done on 08/25/2023.    Take tramadol 4 daily (2 twice daily)   Gabapentin 1-2 at hs only, is sedating if take in daytime    Back Pain: Patient complains of lumbar spine pain which is described as aching.  Patient denies numbness/tingling which does not radiate to legs.   She reports that the pain has been present for several years.  She reports stiffness in lower back after waking up in the morning .  Symptoms are relieved by  Gabapentin and Tramadol .The back problem is not work related.  She denies fever, bladder or bowel incontinence, or weakness to the hips or legs.     Did see Dr Chavez, Rheumatology for initial appt.  last Wednesday, continuing plaquenil and salagen, follow up  in 3 months. (Evoxac not on insurance formulary)     Patient Care Team:  Dilia Griffin MD as PCP - General (Family Medicine)   Current Outpatient Medications on File Prior to Visit   Medication Sig    acetaminophen (TYLENOL) 650 MG 8 hr tablet Take 2 tablets by mouth Every 8 (Eight) Hours As Needed for Mild Pain, Moderate Pain or  "Headache.    aspirin (aspirin) 81 MG EC tablet Take 1 tablet by mouth Daily.    Carboxymethylcellul-Glycerin 0.5-0.9 % solution Apply 1 drop to eye(s) as directed by provider As Needed.    conjugated estrogens (Premarin) 0.625 MG/GM vaginal cream Insert  into the vagina 2 (Two) Times a Week.    fluticasone (FLONASE) 50 MCG/ACT nasal spray 2 sprays into the nostril(s) as directed by provider Daily.    gabapentin (NEURONTIN) 300 MG capsule Take 2 capsules by mouth Every Night. For pain    hydroxychloroquine (PLAQUENIL) 200 MG tablet Take 1 tablet by mouth 2 (Two) Times a Day.    levothyroxine (SYNTHROID, LEVOTHROID) 137 MCG tablet TAKE 1 TABLET BY MOUTH EVERY DAY    Multiple Vitamins-Minerals (CENTRUM ADULTS) tablet Take 1 tablet by mouth Daily.    naloxone (NARCAN) 4 MG/0.1ML nasal spray 1 spray into the nostril(s) as directed by provider As Needed (narcotic overdose).    ondansetron (Zofran) 8 MG tablet Take 1 tablet by mouth Every 12 (Twelve) Hours As Needed for Nausea or Vomiting.    pilocarpine (SALAGEN) 5 MG tablet Take 1 tablet by mouth Every 8 (Eight) Hours.    promethazine (PHENERGAN) 25 MG tablet 1-2 q6h prn, likely sedating    psyllium (Metamucil Smooth Texture) 58.6 % powder 1 teaspoon daily, increase by 1 teaspoon every 3 days to a max of 2 tablespoons    SUMAtriptan (Imitrex) 100 MG tablet Take 1 at onset of Migraine, may rpt in 2 hrs. Max 2 tab in 24h or 3 days/week    vitamin B-12 (CYANOCOBALAMIN) 1000 MCG tablet Take 1 tablet by mouth Daily. (Patient taking differently: Take 1 tablet by mouth Every Other Day.)     No current facility-administered medications on file prior to visit.       Objective   Vital Signs:   /68 (BP Location: Right arm, Patient Position: Sitting, Cuff Size: Large Adult)   Pulse 106   Temp 97.3 °F (36.3 °C) (Temporal)   Resp 18   Ht 172.7 cm (68\")   Wt 100 kg (221 lb)   SpO2 96%   BMI 33.60 kg/m²    BP Readings from Last 3 Encounters:   05/08/24 110/68   02/07/24 " 118/76   01/09/24 126/68     Wt Readings from Last 3 Encounters:   05/08/24 100 kg (221 lb)   02/07/24 104 kg (228 lb 9.6 oz)   01/09/24 104 kg (229 lb 9.6 oz)         Physical Exam  Vitals and nursing note reviewed.   Constitutional:       General: She is not in acute distress.     Appearance: She is well-developed. She is obese.   HENT:      Head: Normocephalic and atraumatic.   Cardiovascular:      Rate and Rhythm: Normal rate and regular rhythm.      Heart sounds: No murmur heard.  Pulmonary:      Effort: Pulmonary effort is normal.      Breath sounds: Normal breath sounds. No wheezing.   Musculoskeletal:         General: Normal range of motion.   Skin:     General: Skin is warm and dry.      Findings: No rash.   Neurological:      Mental Status: She is alert and oriented to person, place, and time.            No visits with results within 1 Day(s) from this visit.   Latest known visit with results is:   Office Visit on 02/07/2024   Component Date Value Ref Range Status    Hemoglobin A1C 02/07/2024 5.8 (H)  4.8 - 5.6 % Final    Comment:          Prediabetes: 5.7 - 6.4           Diabetes: >6.4           Glycemic control for adults with diabetes: <7.0      Glucose 02/07/2024 90  70 - 99 mg/dL Final    BUN 02/07/2024 11  6 - 24 mg/dL Final    Creatinine 02/07/2024 0.94  0.57 - 1.00 mg/dL Final    EGFR Result 02/07/2024 73  >59 mL/min/1.73 Final    BUN/Creatinine Ratio 02/07/2024 12  9 - 23 Final    Sodium 02/07/2024 140  134 - 144 mmol/L Final    Potassium 02/07/2024 5.0  3.5 - 5.2 mmol/L Final    Chloride 02/07/2024 100  96 - 106 mmol/L Final    Total CO2 02/07/2024 27  20 - 29 mmol/L Final    Calcium 02/07/2024 9.8  8.7 - 10.2 mg/dL Final    Total Protein 02/07/2024 7.7  6.0 - 8.5 g/dL Final    Albumin 02/07/2024 4.7  3.8 - 4.9 g/dL Final    Globulin 02/07/2024 3.0  1.5 - 4.5 g/dL Final    A/G Ratio 02/07/2024 1.6  1.2 - 2.2 Final    Total Bilirubin 02/07/2024 0.3  0.0 - 1.2 mg/dL Final    Alkaline Phosphatase  02/07/2024 74  44 - 121 IU/L Final    AST (SGOT) 02/07/2024 29  0 - 40 IU/L Final    ALT (SGPT) 02/07/2024 32  0 - 32 IU/L Final    25 Hydroxy, Vitamin D 02/07/2024 52.2  30.0 - 100.0 ng/mL Final    Comment: Vitamin D deficiency has been defined by the [place] of  [employer]icine and an Endocrine Society practice guideline as a  level of serum 25-OH vitamin D less than 20 ng/mL (1,2).  The Endocrine Society went on to further define vitamin D  insufficiency as a level between 21 and 29 ng/mL (2).  1. IOM ([place] of [employer]). 2010. Dietary reference     intakes for calcium and D. [place]: The     National Academies Press.  2. Corry MF, Hari SAGE, Tegan NINA, et al.     Evaluation, treatment, and prevention of vitamin D     deficiency: an Endocrine Society clinical practice     guideline. JCEM. 2011 Jul; 96(7):1911-30.      TSH 02/07/2024 2.050  0.450 - 4.500 uIU/mL Final    Free T4 02/07/2024 1.18  0.82 - 1.77 ng/dL Final    Vitamin B-12 02/07/2024 1,370 (H)  232 - 1,245 pg/mL Final    WBC 02/07/2024 4.4  3.4 - 10.8 x10E3/uL Final    RBC 02/07/2024 4.78  3.77 - 5.28 x10E6/uL Final    Hemoglobin 02/07/2024 14.9  11.1 - 15.9 g/dL Final    Hematocrit 02/07/2024 43.9  34.0 - 46.6 % Final    MCV 02/07/2024 92  79 - 97 fL Final    MCH 02/07/2024 31.2  26.6 - 33.0 pg Final    MCHC 02/07/2024 33.9  31.5 - 35.7 g/dL Final    RDW 02/07/2024 12.2  11.7 - 15.4 % Final    Platelets 02/07/2024 307  150 - 450 x10E3/uL Final    Neutrophil Rel % 02/07/2024 53  Not Estab. % Final    Lymphocyte Rel % 02/07/2024 30  Not Estab. % Final    Monocyte Rel % 02/07/2024 13  Not Estab. % Final    Eosinophil Rel % 02/07/2024 3  Not Estab. % Final    Basophil Rel % 02/07/2024 1  Not Estab. % Final    Neutrophils Absolute 02/07/2024 2.4  1.4 - 7.0 x10E3/uL Final    Lymphocytes Absolute 02/07/2024 1.3  0.7 - 3.1 x10E3/uL Final    Monocytes Absolute 02/07/2024 0.6  0.1 - 0.9 x10E3/uL Final    Eosinophils Absolute 02/07/2024 0.1  0.0  - 0.4 x10E3/uL Final    Basophils Absolute 02/07/2024 0.1  0.0 - 0.2 x10E3/uL Final    Immature Granulocyte Rel % 02/07/2024 0  Not Estab. % Final    Immature Grans Absolute 02/07/2024 0.0  0.0 - 0.1 x10E3/uL Final     A1C Last 3 Results          2/7/2024    12:00   HGBA1C Last 3 Results   Hemoglobin A1C 5.8      Lab Results   Component Value Date    CHLPL 238 (H) 12/13/2022    TRIG 198 (H) 12/13/2022    HDL 47 12/13/2022     (H) 12/13/2022     Lab Results   Component Value Date    TSH 2.050 02/07/2024     Lab Results   Component Value Date    GLUCOSE 90 02/07/2024    BUN 11 02/07/2024    CREATININE 0.94 02/07/2024    EGFRIFNONA 78 01/03/2022    EGFRIFAFRI 90 01/03/2022    BCR 12 02/07/2024    K 5.0 02/07/2024    CO2 27 02/07/2024    CALCIUM 9.8 02/07/2024    PROTENTOTREF 7.7 02/07/2024    ALBUMIN 4.7 02/07/2024    LABIL2 1.6 02/07/2024    AST 29 02/07/2024    ALT 32 02/07/2024     Lab Results   Component Value Date    WBC 4.4 02/07/2024    HGB 14.9 02/07/2024    HCT 43.9 02/07/2024    MCV 92 02/07/2024     02/07/2024                      Assessment and Plan    Diagnoses and all orders for this visit:    1. Pain management (Primary)    2. Lumbar back pain    3. Class 1 obesity with body mass index (BMI) of 33.0 to 33.9 in adult, unspecified obesity type, unspecified whether serious comorbidity present    4. Chronic bilateral low back pain without sciatica    5. Persistent depressive disorder  -     ARIPiprazole (ABILIFY) 2 MG tablet; Take 1 tablet by mouth Daily.  Dispense: 90 tablet; Refill: 1    6. Anxiety  -     DULoxetine (CYMBALTA) 60 MG capsule; Take 1 capsule by mouth Daily.  Dispense: 90 capsule; Refill: 1  -     clonazePAM (KlonoPIN) 0.5 MG tablet; Take 1 tablet by mouth 2 (Two) Times a Day As Needed for Anxiety.  Dispense: 180 tablet; Refill: 1    7. Chronic pain syndrome  -     traMADol (ULTRAM) 50 MG tablet; Take 2 tablets by mouth Every 8 (Eight) Hours As Needed for Moderate Pain or Severe  Pain.  Dispense: 180 tablet; Refill: 1    8. Cervical disc disorder at C6-C7 level with radiculopathy  -     traMADol (ULTRAM) 50 MG tablet; Take 2 tablets by mouth Every 8 (Eight) Hours As Needed for Moderate Pain or Severe Pain.  Dispense: 180 tablet; Refill: 1    9. Neck pain  -     traMADol (ULTRAM) 50 MG tablet; Take 2 tablets by mouth Every 8 (Eight) Hours As Needed for Moderate Pain or Severe Pain.  Dispense: 180 tablet; Refill: 1    10. Fibromyalgia  -     traMADol (ULTRAM) 50 MG tablet; Take 2 tablets by mouth Every 8 (Eight) Hours As Needed for Moderate Pain or Severe Pain.  Dispense: 180 tablet; Refill: 1    11. Vitamin D deficiency  -     ergocalciferol (ERGOCALCIFEROL) 1.25 MG (77666 UT) capsule; Take 1 capsule by mouth 2 (Two) Times a Week.  Dispense: 24 capsule; Refill: 3    Chronic pain due to fibromyalgia, renewing tramadol    Vitamin D deficiency renewing cholecalciferol    Patient reports moods are stable, we are renewing Cymbalta and Abilify.  Unfortunately she believes Abilify is contributing to her weight gain and difficulty losing weight.  Advised she can try half of a 2 mg tablet but if moods worsen she should increase back to the 2 mg daily.  Also renewing clonazepam, she is taking 0.5 mg every night and an occasional dose during the day for anxiety symptoms.    Medications Discontinued During This Encounter   Medication Reason    oxyCODONE-acetaminophen (Percocet) 5-325 MG per tablet *Therapy completed    Artificial Saliva (SALIVAMAX) pack *Therapy completed    ergocalciferol (ERGOCALCIFEROL) 1.25 MG (42261 UT) capsule Reorder    ARIPiprazole (ABILIFY) 2 MG tablet Reorder    DULoxetine (CYMBALTA) 60 MG capsule Reorder    traMADol (ULTRAM) 50 MG tablet Reorder    clonazePAM (KlonoPIN) 0.5 MG tablet Reorder         Follow Up     No follow-ups on file.    Patient was given instructions and counseling regarding her condition or for health maintenance advice. Please see specific information  pulled into the AVS if appropriate.

## 2024-05-08 ENCOUNTER — TELEPHONE (OUTPATIENT)
Dept: FAMILY MEDICINE CLINIC | Facility: CLINIC | Age: 54
End: 2024-05-08

## 2024-05-08 ENCOUNTER — OFFICE VISIT (OUTPATIENT)
Dept: FAMILY MEDICINE CLINIC | Facility: CLINIC | Age: 54
End: 2024-05-08
Payer: COMMERCIAL

## 2024-05-08 VITALS
TEMPERATURE: 97.3 F | WEIGHT: 221 LBS | SYSTOLIC BLOOD PRESSURE: 110 MMHG | OXYGEN SATURATION: 96 % | RESPIRATION RATE: 18 BRPM | HEART RATE: 106 BPM | HEIGHT: 68 IN | BODY MASS INDEX: 33.49 KG/M2 | DIASTOLIC BLOOD PRESSURE: 68 MMHG

## 2024-05-08 DIAGNOSIS — G89.29 CHRONIC BILATERAL LOW BACK PAIN WITHOUT SCIATICA: ICD-10-CM

## 2024-05-08 DIAGNOSIS — M50.123 CERVICAL DISC DISORDER AT C6-C7 LEVEL WITH RADICULOPATHY: ICD-10-CM

## 2024-05-08 DIAGNOSIS — G89.4 CHRONIC PAIN SYNDROME: ICD-10-CM

## 2024-05-08 DIAGNOSIS — R52 PAIN MANAGEMENT: Primary | ICD-10-CM

## 2024-05-08 DIAGNOSIS — M54.50 LUMBAR BACK PAIN: ICD-10-CM

## 2024-05-08 DIAGNOSIS — E66.9 CLASS 1 OBESITY WITH BODY MASS INDEX (BMI) OF 33.0 TO 33.9 IN ADULT, UNSPECIFIED OBESITY TYPE, UNSPECIFIED WHETHER SERIOUS COMORBIDITY PRESENT: ICD-10-CM

## 2024-05-08 DIAGNOSIS — M54.2 NECK PAIN: ICD-10-CM

## 2024-05-08 DIAGNOSIS — E55.9 VITAMIN D DEFICIENCY: ICD-10-CM

## 2024-05-08 DIAGNOSIS — M79.7 FIBROMYALGIA: ICD-10-CM

## 2024-05-08 DIAGNOSIS — F41.9 ANXIETY: ICD-10-CM

## 2024-05-08 DIAGNOSIS — M54.50 CHRONIC BILATERAL LOW BACK PAIN WITHOUT SCIATICA: ICD-10-CM

## 2024-05-08 DIAGNOSIS — F34.1 PERSISTENT DEPRESSIVE DISORDER: ICD-10-CM

## 2024-05-08 PROCEDURE — 99214 OFFICE O/P EST MOD 30 MIN: CPT | Performed by: FAMILY MEDICINE

## 2024-05-08 RX ORDER — CLONAZEPAM 0.5 MG/1
0.5 TABLET ORAL 2 TIMES DAILY PRN
Qty: 180 TABLET | Refills: 1 | Status: SHIPPED | OUTPATIENT
Start: 2024-05-08

## 2024-05-08 RX ORDER — TRAMADOL HYDROCHLORIDE 50 MG/1
100 TABLET ORAL EVERY 8 HOURS PRN
Qty: 180 TABLET | Refills: 1 | Status: SHIPPED | OUTPATIENT
Start: 2024-05-08

## 2024-05-08 RX ORDER — DULOXETIN HYDROCHLORIDE 60 MG/1
60 CAPSULE, DELAYED RELEASE ORAL DAILY
Qty: 90 CAPSULE | Refills: 1 | Status: SHIPPED | OUTPATIENT
Start: 2024-05-08

## 2024-05-08 RX ORDER — ERGOCALCIFEROL 1.25 MG/1
1 CAPSULE ORAL 2 TIMES WEEKLY
Qty: 24 CAPSULE | Refills: 3 | Status: SHIPPED | OUTPATIENT
Start: 2024-05-09

## 2024-05-08 RX ORDER — ARIPIPRAZOLE 2 MG/1
2 TABLET ORAL DAILY
Qty: 90 TABLET | Refills: 1 | Status: SHIPPED | OUTPATIENT
Start: 2024-05-08

## 2024-05-14 DIAGNOSIS — K21.9 GASTROESOPHAGEAL REFLUX DISEASE, UNSPECIFIED WHETHER ESOPHAGITIS PRESENT: ICD-10-CM

## 2024-05-14 RX ORDER — OMEPRAZOLE 40 MG/1
40 CAPSULE, DELAYED RELEASE ORAL DAILY
Qty: 30 CAPSULE | Refills: 5 | Status: SHIPPED | OUTPATIENT
Start: 2024-05-14

## 2024-07-29 ENCOUNTER — TELEPHONE (OUTPATIENT)
Dept: FAMILY MEDICINE CLINIC | Facility: CLINIC | Age: 54
End: 2024-07-29

## 2024-07-29 NOTE — TELEPHONE ENCOUNTER
PATIENT WOULD LIKE TO LET  KNOW THAT SHE HAS TO RESCHEDULE HER APPOINTMENT DUE TO HER COLONOSCOPY BEING THE SAME DAY.

## 2024-08-13 DIAGNOSIS — M54.2 NECK PAIN: ICD-10-CM

## 2024-08-13 DIAGNOSIS — M79.7 FIBROMYALGIA: ICD-10-CM

## 2024-08-13 DIAGNOSIS — G89.4 CHRONIC PAIN SYNDROME: ICD-10-CM

## 2024-08-13 DIAGNOSIS — M50.123 CERVICAL DISC DISORDER AT C6-C7 LEVEL WITH RADICULOPATHY: ICD-10-CM

## 2024-08-13 RX ORDER — TRAMADOL HYDROCHLORIDE 50 MG/1
100 TABLET ORAL EVERY 8 HOURS PRN
Qty: 180 TABLET | Refills: 0 | Status: SHIPPED | OUTPATIENT
Start: 2024-08-13

## 2024-08-14 ENCOUNTER — OFFICE (AMBULATORY)
Age: 54
End: 2024-08-14
Payer: COMMERCIAL

## 2024-08-14 ENCOUNTER — OFFICE (AMBULATORY)
Age: 54
End: 2024-08-14

## 2024-08-14 ENCOUNTER — OFFICE (AMBULATORY)
Dept: URBAN - METROPOLITAN AREA PATHOLOGY 19 | Facility: PATHOLOGY | Age: 54
End: 2024-08-14
Payer: COMMERCIAL

## 2024-08-14 ENCOUNTER — ON CAMPUS - OUTPATIENT (AMBULATORY)
Dept: URBAN - METROPOLITAN AREA HOSPITAL 2 | Facility: HOSPITAL | Age: 54
End: 2024-08-14
Payer: COMMERCIAL

## 2024-08-14 ENCOUNTER — OFFICE (AMBULATORY)
Dept: URBAN - METROPOLITAN AREA PATHOLOGY 19 | Facility: PATHOLOGY | Age: 54
End: 2024-08-14

## 2024-08-14 VITALS
TEMPERATURE: 97 F | HEART RATE: 70 BPM | OXYGEN SATURATION: 98 % | SYSTOLIC BLOOD PRESSURE: 105 MMHG | SYSTOLIC BLOOD PRESSURE: 139 MMHG | DIASTOLIC BLOOD PRESSURE: 70 MMHG | OXYGEN SATURATION: 97 % | OXYGEN SATURATION: 100 % | HEIGHT: 68 IN | HEART RATE: 79 BPM | OXYGEN SATURATION: 95 % | SYSTOLIC BLOOD PRESSURE: 157 MMHG | DIASTOLIC BLOOD PRESSURE: 91 MMHG | SYSTOLIC BLOOD PRESSURE: 90 MMHG | SYSTOLIC BLOOD PRESSURE: 110 MMHG | DIASTOLIC BLOOD PRESSURE: 89 MMHG | RESPIRATION RATE: 17 BRPM | DIASTOLIC BLOOD PRESSURE: 64 MMHG | SYSTOLIC BLOOD PRESSURE: 108 MMHG | SYSTOLIC BLOOD PRESSURE: 138 MMHG | SYSTOLIC BLOOD PRESSURE: 121 MMHG | DIASTOLIC BLOOD PRESSURE: 83 MMHG | DIASTOLIC BLOOD PRESSURE: 73 MMHG | DIASTOLIC BLOOD PRESSURE: 77 MMHG | OXYGEN SATURATION: 96 % | DIASTOLIC BLOOD PRESSURE: 82 MMHG | HEART RATE: 67 BPM | RESPIRATION RATE: 18 BRPM | HEART RATE: 68 BPM | HEART RATE: 85 BPM | WEIGHT: 219 LBS | DIASTOLIC BLOOD PRESSURE: 85 MMHG | OXYGEN SATURATION: 99 % | SYSTOLIC BLOOD PRESSURE: 107 MMHG | RESPIRATION RATE: 16 BRPM

## 2024-08-14 DIAGNOSIS — D12.3 BENIGN NEOPLASM OF TRANSVERSE COLON: ICD-10-CM

## 2024-08-14 DIAGNOSIS — Z86.010 PERSONAL HISTORY OF COLONIC POLYPS: ICD-10-CM

## 2024-08-14 DIAGNOSIS — Z86.010 PERSONAL HISTORY OF COLON POLYPS: ICD-10-CM

## 2024-08-14 DIAGNOSIS — Z09 ENCOUNTER FOR FOLLOW-UP EXAMINATION AFTER COMPLETED TREATMEN: ICD-10-CM

## 2024-08-14 PROBLEM — K63.5 POLYP OF COLON: Status: ACTIVE | Noted: 2024-08-14

## 2024-08-14 LAB
GI HISTOLOGY: A. TRANSVERSE COLON: (no result)
GI HISTOLOGY: PDF REPORT: (no result)

## 2024-08-14 PROCEDURE — 88305 TISSUE EXAM BY PATHOLOGIST: CPT | Mod: 26 | Performed by: PATHOLOGY

## 2024-08-14 PROCEDURE — 45385 COLONOSCOPY W/LESION REMOVAL: CPT | Mod: 33 | Performed by: INTERNAL MEDICINE

## 2024-09-10 NOTE — PROGRESS NOTES
Chief Complaint  Pain management, Back Pain, and Anxiety    History of Present Illness  Barbara Carter presents today for follow up on pain management and anxiety      Pain Management   Patient states medications are working well.  As long as she is taking her medication she is able to participate in activities of daily living including babysitting some of her 6 grandchildren under the age of 5 on a regular basis.    Aggravating factors include any weight bearing, going up and down stairs, and inactivity.  Pain scale today is 0.   Inspect done on 9/11/2024.  Patient pain contract is not up to date. Last done on 8/25/2023. Will update in office today.  Last UDS done on 8/25/2023.      Back Pain:   Patient complains of lumbar spine pain which is described as aching.  Patient denies numbness/tingling which does not radiate to legs.   She reports that the pain has been present for several years.  She reports stiffness in lower back after waking up in the morning .  Symptoms are relieved by  Gabapentin and Tramadol .The back problem is not work related.  She denies fever, bladder or bowel incontinence, or weakness to the hips or legs.      Anxiety/Depression  Associated symptoms include: anxiety   Severity is described per patient : Mild  Patient reports they are taking medications as prescribed and they are not having side effects.    BMI is >= 30 and <35. (Class 1 Obesity). The following options were offered after discussion;: exercise counseling/recommendations and nutrition counseling/recommendations     Patient Care Team:  Dilia Griffin MD as PCP - General (Family Medicine)   Current Outpatient Medications on File Prior to Visit   Medication Sig    acetaminophen (TYLENOL) 650 MG 8 hr tablet Take 2 tablets by mouth Every 8 (Eight) Hours As Needed for Mild Pain, Moderate Pain or Headache.    aspirin (aspirin) 81 MG EC tablet Take 1 tablet by mouth Daily.    ergocalciferol (ERGOCALCIFEROL) 1.25 MG (10330  UT) capsule Take 1 capsule by mouth 2 (Two) Times a Week.    fluticasone (FLONASE) 50 MCG/ACT nasal spray 2 sprays into the nostril(s) as directed by provider Daily.    hydroxychloroquine (PLAQUENIL) 200 MG tablet Take 1 tablet by mouth 2 (Two) Times a Day.    levothyroxine (SYNTHROID, LEVOTHROID) 137 MCG tablet TAKE 1 TABLET BY MOUTH EVERY DAY    Multiple Vitamins-Minerals (CENTRUM ADULTS) tablet Take 1 tablet by mouth Daily.    naloxone (NARCAN) 4 MG/0.1ML nasal spray 1 spray into the nostril(s) as directed by provider As Needed (narcotic overdose).    omeprazole (priLOSEC) 40 MG capsule TAKE 1 CAPSULE BY MOUTH EVERY DAY    ondansetron (Zofran) 8 MG tablet Take 1 tablet by mouth Every 12 (Twelve) Hours As Needed for Nausea or Vomiting.    pilocarpine (SALAGEN) 5 MG tablet Take 1 tablet by mouth Every 8 (Eight) Hours.    promethazine (PHENERGAN) 25 MG tablet 1-2 q6h prn, likely sedating    psyllium (Metamucil Smooth Texture) 58.6 % powder 1 teaspoon daily, increase by 1 teaspoon every 3 days to a max of 2 tablespoons    SUMAtriptan (Imitrex) 100 MG tablet Take 1 at onset of Migraine, may rpt in 2 hrs. Max 2 tab in 24h or 3 days/week    [DISCONTINUED] ARIPiprazole (ABILIFY) 2 MG tablet Take 1 tablet by mouth Daily.    [DISCONTINUED] clonazePAM (KlonoPIN) 0.5 MG tablet Take 1 tablet by mouth 2 (Two) Times a Day As Needed for Anxiety.    [DISCONTINUED] DULoxetine (CYMBALTA) 60 MG capsule Take 1 capsule by mouth Daily.    [DISCONTINUED] gabapentin (NEURONTIN) 300 MG capsule Take 2 capsules by mouth Every Night. For pain    [DISCONTINUED] traMADol (ULTRAM) 50 MG tablet Take 2 tablets by mouth Every 8 (Eight) Hours As Needed for Moderate Pain. for pain (Patient taking differently: Take 2 tablets by mouth Every Night. for pain)    [DISCONTINUED] vitamin B-12 (CYANOCOBALAMIN) 1000 MCG tablet Take 1 tablet by mouth Daily. (Patient taking differently: Take 1 tablet by mouth Every Other Day.)    Carboxymethylcellul-Glycerin  "0.5-0.9 % solution Apply 1 drop to eye(s) as directed by provider As Needed. (Patient not taking: Reported on 9/11/2024)    conjugated estrogens (Premarin) 0.625 MG/GM vaginal cream Insert  into the vagina 2 (Two) Times a Week. (Patient not taking: Reported on 9/11/2024)     No current facility-administered medications on file prior to visit.       Objective   Vital Signs:   /82 (BP Location: Right arm, Patient Position: Sitting, Cuff Size: Large Adult)   Pulse 82   Temp 98 °F (36.7 °C) (Temporal)   Resp 18   Ht 172.7 cm (68\")   Wt 100 kg (220 lb 6.4 oz)   SpO2 96%   BMI 33.51 kg/m²    BP Readings from Last 3 Encounters:   09/11/24 128/82   05/08/24 110/68   02/07/24 118/76     Wt Readings from Last 3 Encounters:   09/11/24 100 kg (220 lb 6.4 oz)   05/08/24 100 kg (221 lb)   02/07/24 104 kg (228 lb 9.6 oz)         Physical Exam  Vitals and nursing note reviewed.   Constitutional:       General: She is not in acute distress.     Appearance: Normal appearance. She is well-developed. She is obese. She is not ill-appearing.   HENT:      Head: Normocephalic and atraumatic.   Cardiovascular:      Rate and Rhythm: Normal rate and regular rhythm.      Heart sounds: No murmur heard.  Pulmonary:      Effort: Pulmonary effort is normal.      Breath sounds: Normal breath sounds. No wheezing.   Musculoskeletal:         General: Normal range of motion.   Skin:     General: Skin is warm and dry.      Findings: No rash.   Neurological:      Mental Status: She is alert and oriented to person, place, and time.   Psychiatric:         Mood and Affect: Mood normal.         Behavior: Behavior normal.         Thought Content: Thought content normal.            No visits with results within 1 Day(s) from this visit.   Latest known visit with results is:   Office Visit on 02/07/2024   Component Date Value Ref Range Status    Hemoglobin A1C 02/07/2024 5.8 (H)  4.8 - 5.6 % Final    Comment:          Prediabetes: 5.7 - 6.4         "   Diabetes: >6.4           Glycemic control for adults with diabetes: <7.0      Glucose 02/07/2024 90  70 - 99 mg/dL Final    BUN 02/07/2024 11  6 - 24 mg/dL Final    Creatinine 02/07/2024 0.94  0.57 - 1.00 mg/dL Final    EGFR Result 02/07/2024 73  >59 mL/min/1.73 Final    BUN/Creatinine Ratio 02/07/2024 12  9 - 23 Final    Sodium 02/07/2024 140  134 - 144 mmol/L Final    Potassium 02/07/2024 5.0  3.5 - 5.2 mmol/L Final    Chloride 02/07/2024 100  96 - 106 mmol/L Final    Total CO2 02/07/2024 27  20 - 29 mmol/L Final    Calcium 02/07/2024 9.8  8.7 - 10.2 mg/dL Final    Total Protein 02/07/2024 7.7  6.0 - 8.5 g/dL Final    Albumin 02/07/2024 4.7  3.8 - 4.9 g/dL Final    Globulin 02/07/2024 3.0  1.5 - 4.5 g/dL Final    A/G Ratio 02/07/2024 1.6  1.2 - 2.2 Final    Total Bilirubin 02/07/2024 0.3  0.0 - 1.2 mg/dL Final    Alkaline Phosphatase 02/07/2024 74  44 - 121 IU/L Final    AST (SGOT) 02/07/2024 29  0 - 40 IU/L Final    ALT (SGPT) 02/07/2024 32  0 - 32 IU/L Final    25 Hydroxy, Vitamin D 02/07/2024 52.2  30.0 - 100.0 ng/mL Final    Comment: Vitamin D deficiency has been defined by the [place] of  Medicine and an Endocrine Society practice guideline as a  level of serum 25-OH vitamin D less than 20 ng/mL (1,2).  The Endocrine Society went on to further define vitamin D  insufficiency as a level between 21 and 29 ng/mL (2).  1. IOM ([place] of [employer]). 2010. Dietary reference     intakes for calcium and D. [place]: The     National Academies Press.  2. Corry MF, Hari NC, Tegan NINA, et al.     Evaluation, treatment, and prevention of vitamin D     deficiency: an Endocrine Society clinical practice     guideline. JCEM. 2011 Jul; 96(7):1911-30.      TSH 02/07/2024 2.050  0.450 - 4.500 uIU/mL Final    Free T4 02/07/2024 1.18  0.82 - 1.77 ng/dL Final    Vitamin B-12 02/07/2024 1,370 (H)  232 - 1,245 pg/mL Final    WBC 02/07/2024 4.4  3.4 - 10.8 x10E3/uL Final    RBC 02/07/2024 4.78  3.77 - 5.28  x10E6/uL Final    Hemoglobin 02/07/2024 14.9  11.1 - 15.9 g/dL Final    Hematocrit 02/07/2024 43.9  34.0 - 46.6 % Final    MCV 02/07/2024 92  79 - 97 fL Final    MCH 02/07/2024 31.2  26.6 - 33.0 pg Final    MCHC 02/07/2024 33.9  31.5 - 35.7 g/dL Final    RDW 02/07/2024 12.2  11.7 - 15.4 % Final    Platelets 02/07/2024 307  150 - 450 x10E3/uL Final    Neutrophil Rel % 02/07/2024 53  Not Estab. % Final    Lymphocyte Rel % 02/07/2024 30  Not Estab. % Final    Monocyte Rel % 02/07/2024 13  Not Estab. % Final    Eosinophil Rel % 02/07/2024 3  Not Estab. % Final    Basophil Rel % 02/07/2024 1  Not Estab. % Final    Neutrophils Absolute 02/07/2024 2.4  1.4 - 7.0 x10E3/uL Final    Lymphocytes Absolute 02/07/2024 1.3  0.7 - 3.1 x10E3/uL Final    Monocytes Absolute 02/07/2024 0.6  0.1 - 0.9 x10E3/uL Final    Eosinophils Absolute 02/07/2024 0.1  0.0 - 0.4 x10E3/uL Final    Basophils Absolute 02/07/2024 0.1  0.0 - 0.2 x10E3/uL Final    Immature Granulocyte Rel % 02/07/2024 0  Not Estab. % Final    Immature Grans Absolute 02/07/2024 0.0  0.0 - 0.1 x10E3/uL Final     A1C Last 3 Results          2/7/2024    12:00   HGBA1C Last 3 Results   Hemoglobin A1C 5.8      Lab Results   Component Value Date    CHLPL 238 (H) 12/13/2022    TRIG 198 (H) 12/13/2022    HDL 47 12/13/2022     (H) 12/13/2022     Lab Results   Component Value Date    TSH 2.050 02/07/2024     Lab Results   Component Value Date    GLUCOSE 90 02/07/2024    BUN 11 02/07/2024    CREATININE 0.94 02/07/2024    EGFRIFNONA 78 01/03/2022    EGFRIFAFRI 90 01/03/2022    BCR 12 02/07/2024    K 5.0 02/07/2024    CO2 27 02/07/2024    CALCIUM 9.8 02/07/2024    PROTENTOTREF 7.7 02/07/2024    ALBUMIN 4.7 02/07/2024    LABIL2 1.6 02/07/2024    AST 29 02/07/2024    ALT 32 02/07/2024     Lab Results   Component Value Date    WBC 4.4 02/07/2024    HGB 14.9 02/07/2024    HCT 43.9 02/07/2024    MCV 92 02/07/2024     02/07/2024                      Assessment and Plan     Diagnoses and all orders for this visit:    1. Class 1 obesity with body mass index (BMI) of 33.0 to 33.9 in adult, unspecified obesity type, unspecified whether serious comorbidity present (Primary)    2. Chronic pain syndrome  -     traMADol (ULTRAM) 50 MG tablet; Take 2 tablets by mouth Every Night. for pain  Dispense: 168 tablet; Refill: 1  -     Drug Screen 16 w/Reflex Confirmation    3. Cervical disc disorder at C6-C7 level with radiculopathy  -     traMADol (ULTRAM) 50 MG tablet; Take 2 tablets by mouth Every Night. for pain  Dispense: 168 tablet; Refill: 1  -     gabapentin (NEURONTIN) 300 MG capsule; Take 2 capsules by mouth Every Night. For pain  Dispense: 180 capsule; Refill: 1    4. Neck pain  -     traMADol (ULTRAM) 50 MG tablet; Take 2 tablets by mouth Every Night. for pain  Dispense: 168 tablet; Refill: 1    5. Fibromyalgia  -     traMADol (ULTRAM) 50 MG tablet; Take 2 tablets by mouth Every Night. for pain  Dispense: 168 tablet; Refill: 1    6. Menopause    7. Persistent depressive disorder  -     ARIPiprazole (ABILIFY) 2 MG tablet; Take 1 tablet by mouth Daily.  Dispense: 90 tablet; Refill: 1    8. Anxiety  -     DULoxetine (CYMBALTA) 60 MG capsule; Take 1 capsule by mouth Daily.  Dispense: 90 capsule; Refill: 1  -     clonazePAM (KlonoPIN) 0.5 MG tablet; Take 1 tablet by mouth At Night As Needed for Anxiety. May take single daytime dose if needed for increased anxiety  Dispense: 56 tablet; Refill: 4  -     Drug Screen 16 w/Reflex Confirmation    9. Vitamin D deficiency    10. Need for influenza vaccination  -     Fluzone >6mos    11. B12 deficiency  -     vitamin B-12 (CYANOCOBALAMIN) 1000 MCG tablet; Take 1 tablet by mouth Every Other Day.  Dispense: 45 tablet; Refill: 3    12. Encounter for pain management  -     Drug Screen 16 w/Reflex Confirmation    Chronic pain from cervical degenerative disc disease and fibromyalgia as well as chronic anxiety.  Patient takes clonazepam 0.5 mg, gabapentin  600 mg, and tramadol 100 mg nightly, last dose for all 3 was last night.  Occasionally when she is having a bad pain day she will take a dose of tramadol 100 mg total during the day no more than twice a week.  Similarly for clonazepam she will take an additional single 0.5 mg dose 1-2 times a week at the most when anxiety is more overwhelming.  She does state adding Abilify to duloxetine has improved anxiety overall.  She never takes a daytime dose of gabapentin.  Overall with combination of medications she states pain is adequately controlled.  She is having some increased leg pain/cramping last couple of weeks but is tolerable.  Pain inventory, COMM, PHQ,  has been completed and reviewed and updated pain management agreement has been signed.  Inspect report has been reviewed and other than quantity is below prescribed amount is as expected.  Had intended 180 tramadol for 3-month supply she has been getting 168 tablets, reducing current prescription to pharmacy allowed quantity.  Similar for clonazepam previously had prescribed as 90-day supply and is receiving 56, updating quantity and today's prescription.  It has been 1 year since last drug screen, doing drug screen today.          The patient was counseled regarding nutrition, physical activity, healthy weight, injury prevention, immunizations and preventative health screenings.  Last year influenza vaccination today discussed and strongly recommend Shingrix she will check insurance for coverage.  She does not intend to get future COVID vaccinations, removing from care gaps.  Has been 5 years since last Pap, will do with next appointment in 3 months.  Patient did have colonoscopy 8/14/2024, Dr. Gomez pathology transverse colon tubular adenomas x 2 told to follow up in 7 years, updating record.    Medications Discontinued During This Encounter   Medication Reason    vitamin B-12 (CYANOCOBALAMIN) 1000 MCG tablet     gabapentin (NEURONTIN) 300 MG capsule Reorder     ARIPiprazole (ABILIFY) 2 MG tablet Reorder    DULoxetine (CYMBALTA) 60 MG capsule Reorder    clonazePAM (KlonoPIN) 0.5 MG tablet Reorder    traMADol (ULTRAM) 50 MG tablet Reorder         Follow Up     Return in about 3 months (around 12/11/2024) for pain managment, anxiety, depression PAP (not annual exam) .    Patient was given instructions and counseling regarding her condition or for health maintenance advice. Please see specific information pulled into the AVS if appropriate.

## 2024-09-11 ENCOUNTER — OFFICE VISIT (OUTPATIENT)
Dept: FAMILY MEDICINE CLINIC | Facility: CLINIC | Age: 54
End: 2024-09-11
Payer: COMMERCIAL

## 2024-09-11 ENCOUNTER — TELEPHONE (OUTPATIENT)
Dept: FAMILY MEDICINE CLINIC | Facility: CLINIC | Age: 54
End: 2024-09-11

## 2024-09-11 VITALS
OXYGEN SATURATION: 96 % | BODY MASS INDEX: 33.4 KG/M2 | WEIGHT: 220.4 LBS | HEIGHT: 68 IN | TEMPERATURE: 98 F | DIASTOLIC BLOOD PRESSURE: 82 MMHG | RESPIRATION RATE: 18 BRPM | HEART RATE: 82 BPM | SYSTOLIC BLOOD PRESSURE: 128 MMHG

## 2024-09-11 DIAGNOSIS — G89.4 CHRONIC PAIN SYNDROME: ICD-10-CM

## 2024-09-11 DIAGNOSIS — F41.9 ANXIETY: ICD-10-CM

## 2024-09-11 DIAGNOSIS — E53.8 B12 DEFICIENCY: ICD-10-CM

## 2024-09-11 DIAGNOSIS — M50.123 CERVICAL DISC DISORDER AT C6-C7 LEVEL WITH RADICULOPATHY: ICD-10-CM

## 2024-09-11 DIAGNOSIS — E66.9 CLASS 1 OBESITY WITH BODY MASS INDEX (BMI) OF 33.0 TO 33.9 IN ADULT, UNSPECIFIED OBESITY TYPE, UNSPECIFIED WHETHER SERIOUS COMORBIDITY PRESENT: Primary | ICD-10-CM

## 2024-09-11 DIAGNOSIS — R52 ENCOUNTER FOR PAIN MANAGEMENT: ICD-10-CM

## 2024-09-11 DIAGNOSIS — Z23 NEED FOR INFLUENZA VACCINATION: ICD-10-CM

## 2024-09-11 DIAGNOSIS — F34.1 PERSISTENT DEPRESSIVE DISORDER: ICD-10-CM

## 2024-09-11 DIAGNOSIS — E55.9 VITAMIN D DEFICIENCY: ICD-10-CM

## 2024-09-11 DIAGNOSIS — M54.2 NECK PAIN: ICD-10-CM

## 2024-09-11 DIAGNOSIS — M79.7 FIBROMYALGIA: ICD-10-CM

## 2024-09-11 DIAGNOSIS — Z78.0 MENOPAUSE: ICD-10-CM

## 2024-09-11 PROCEDURE — 90656 IIV3 VACC NO PRSV 0.5 ML IM: CPT | Performed by: FAMILY MEDICINE

## 2024-09-11 PROCEDURE — 99214 OFFICE O/P EST MOD 30 MIN: CPT | Performed by: FAMILY MEDICINE

## 2024-09-11 PROCEDURE — 90471 IMMUNIZATION ADMIN: CPT | Performed by: FAMILY MEDICINE

## 2024-09-11 RX ORDER — CLONAZEPAM 0.5 MG/1
0.5 TABLET ORAL NIGHTLY PRN
Qty: 56 TABLET | Refills: 4 | Status: SHIPPED | OUTPATIENT
Start: 2024-09-11

## 2024-09-11 RX ORDER — ERGOCALCIFEROL 1.25 MG/1
1 CAPSULE ORAL 2 TIMES WEEKLY
Qty: 24 CAPSULE | Refills: 3 | Status: CANCELLED | OUTPATIENT
Start: 2024-09-12

## 2024-09-11 RX ORDER — GABAPENTIN 300 MG/1
600 CAPSULE ORAL NIGHTLY
Qty: 180 CAPSULE | Refills: 1 | Status: SHIPPED | OUTPATIENT
Start: 2024-09-11

## 2024-09-11 RX ORDER — TRAMADOL HYDROCHLORIDE 50 MG/1
100 TABLET ORAL NIGHTLY
Qty: 168 TABLET | Refills: 1 | Status: SHIPPED | OUTPATIENT
Start: 2024-09-11

## 2024-09-11 RX ORDER — ARIPIPRAZOLE 2 MG/1
2 TABLET ORAL DAILY
Qty: 90 TABLET | Refills: 1 | Status: SHIPPED | OUTPATIENT
Start: 2024-09-11

## 2024-09-11 RX ORDER — CONJUGATED ESTROGENS 0.62 MG/G
CREAM VAGINAL 2 TIMES WEEKLY
Qty: 30 G | Refills: 12 | Status: CANCELLED | OUTPATIENT
Start: 2024-09-12

## 2024-09-11 RX ORDER — LANOLIN ALCOHOL/MO/W.PET/CERES
1000 CREAM (GRAM) TOPICAL EVERY OTHER DAY
Qty: 45 TABLET | Refills: 3 | Status: SHIPPED | OUTPATIENT
Start: 2024-09-11

## 2024-09-11 RX ORDER — DULOXETIN HYDROCHLORIDE 60 MG/1
60 CAPSULE, DELAYED RELEASE ORAL DAILY
Qty: 90 CAPSULE | Refills: 1 | Status: SHIPPED | OUTPATIENT
Start: 2024-09-11

## 2024-09-11 NOTE — TELEPHONE ENCOUNTER
Chantelle with [facility] called and needs to confirm the directions on this rx.  The past rx's have been 2 every 8 hours. Please contact her at .  Thanks Scarlett

## 2024-09-11 NOTE — TELEPHONE ENCOUNTER
I called and spoke with Chantelle, I confirmed with her that the instructions sent over today are correct. She voiced understanding.

## 2024-09-21 LAB
GABAPENTIN SERPLBLD QL SCN: NEGATIVE
GABAPENTIN SERPLBLD-MCNC: NEGATIVE UG/ML

## 2024-09-25 LAB
AMPHETAMINES SPEC-MCNC: NEGATIVE NG/ML
BARBITURATES SERPLBLD QL: NEGATIVE UG/ML
BENZODIAZ BLD QL: NEGATIVE NG/ML
BUPRENORPHINE BLD QL SCN: NEGATIVE NG/ML
CANNABINOIDS BLD QL SCN: NEGATIVE NG/ML
CARISOPRODOL+MEPROB BLD QL SCN: NEGATIVE UG/ML
COCAINE+BZE SERPLBLD QL SCN: NEGATIVE NG/ML
FENTANYL BLD QL SCN: NEGATIVE NG/ML
GABAPENTIN SERPLBLD QL SCN: NEGATIVE UG/ML
MEPERIDINE SERPLBLD QL SCN: NEGATIVE NG/ML
METHADONE BLD QL SCN: NEGATIVE NG/ML
O-NORTRAMADOL SERPLBLD CFM-MCNC: 27.4 NG/ML
OPIATES BLD QL SCN: NEGATIVE NG/ML
OXYCODONE BLD QL SCN: NEGATIVE NG/ML
PCP BLD QL SCN: NEGATIVE NG/ML
PROPOXYPH BLD QL SCN: NEGATIVE NG/ML
TRAMADOL BLD QL CFM: POSITIVE
TRAMADOL BLD QL SCN: ABNORMAL NG/ML
TRAMADOL BLD-MCNC: 102.9 NG/ML

## 2024-11-13 DIAGNOSIS — E03.9 ACQUIRED HYPOTHYROIDISM: ICD-10-CM

## 2024-11-13 DIAGNOSIS — F41.9 ANXIETY: ICD-10-CM

## 2024-11-13 DIAGNOSIS — K21.9 GASTROESOPHAGEAL REFLUX DISEASE, UNSPECIFIED WHETHER ESOPHAGITIS PRESENT: ICD-10-CM

## 2024-11-13 RX ORDER — CLONAZEPAM 0.5 MG/1
0.5 TABLET ORAL 2 TIMES DAILY PRN
Qty: 180 TABLET | Refills: 0 | Status: SHIPPED | OUTPATIENT
Start: 2024-11-13

## 2024-11-13 RX ORDER — OMEPRAZOLE 40 MG/1
40 CAPSULE, DELAYED RELEASE ORAL DAILY
Qty: 30 CAPSULE | Refills: 5 | Status: SHIPPED | OUTPATIENT
Start: 2024-11-13

## 2024-11-13 RX ORDER — LEVOTHYROXINE SODIUM 137 UG/1
137 TABLET ORAL DAILY
Qty: 90 TABLET | Refills: 5 | Status: SHIPPED | OUTPATIENT
Start: 2024-11-13

## 2024-11-18 ENCOUNTER — TELEPHONE (OUTPATIENT)
Dept: FAMILY MEDICINE CLINIC | Facility: CLINIC | Age: 54
End: 2024-11-18
Payer: COMMERCIAL

## 2024-11-18 DIAGNOSIS — M50.123 CERVICAL DISC DISORDER AT C6-C7 LEVEL WITH RADICULOPATHY: ICD-10-CM

## 2024-11-18 DIAGNOSIS — G89.4 CHRONIC PAIN SYNDROME: ICD-10-CM

## 2024-11-18 DIAGNOSIS — M54.2 NECK PAIN: ICD-10-CM

## 2024-11-18 DIAGNOSIS — M79.7 FIBROMYALGIA: ICD-10-CM

## 2024-11-18 RX ORDER — TRAMADOL HYDROCHLORIDE 50 MG/1
TABLET ORAL
Qty: 270 TABLET | Refills: 0 | Status: SHIPPED | OUTPATIENT
Start: 2024-11-18

## 2024-11-18 NOTE — TELEPHONE ENCOUNTER
"Patient returned my call and states she has always taken 2 tablets nightly of her tramadol as the prescription is written from 9/11/24. However she states that previously it has been prescribed to where she takes the two tablets every night and 2 tablets during the day PRN as well. She states she has been taking the additional 2 tablets during the day along with her nightly doses about 2-3 times per week so she is now out of th medication. Her next appointment is not until 12/11/24. She states she was unaware before now that the tramadol was written and prescribed as \"take 2 tablets by mouth Every Night\" only.  "

## 2024-11-18 NOTE — TELEPHONE ENCOUNTER
Caller: Barbara Carter    Relationship: Self    Best call back number: [phone]    Which medication are you concerned about: TRAMADOL     What are your concerns: PATIENT STATES THAT THIS IS WRITTEN TO TAKE THIS AT NIGHT TIME ONLY. PATIENT STATES THAT SHE THOUGHT SHE COULD TAKE THIS MEDICATION IN THE DAY TIME IF NEEDED. PATIENT STATES TAHT THE PRESCRIPTION DOES NOT ALLOW HER TO TAKE THIS IN THE DAY TIME IF SHE IS NEEDING IT DUE TO THERE NOT BEING ENOUGH MEDICATION.    PLEASE ADVISE PATIENT ON NEXT STEPS ASAP.

## 2024-11-18 NOTE — TELEPHONE ENCOUNTER
I have updated directions and quantity for 2 tablets at bedtime and 1 to 2 tablets during the day as needed for chronic pain for quantity of 270, for an average of 3 daily for a 90-day supply.  Previous quantity was 168, this may have been the maximum allowed by insurance.  If maximum allowed is less than 270 tablets pharmacy will adjust prescription.  Regardless new prescription was sent to pharmacy and should last until next appointment in December.

## 2024-12-10 NOTE — PROGRESS NOTES
Chief Complaint  Pain management, Anxiety, Depression, and Gynecologic Exam    History of Present Illness  Barbara Carter presents today for follow up on pain management, depression, anxiety, and gynecological exam    Pain Management   Patient states medications are working well.   Aggravating factors include going up and down stairs and inactivity  Pain scale today is 3.   Inspect done on 12/10/24.  Patient pain contract is up to date. Last done on 9/11/24.  Last UDS done on 9/11/24.      Back Pain:   Patient complains of lumbar spine pain which is described as aching.  Patient reports pain which does radiate to legs and arms.   She reports that the pain has been present for several years.  She reports stiffness in lower back after waking up in the morning . Symptoms are relieved by  Gabapentin and Tramadol .The back problem is not work related.  She denies fever, bladder or bowel incontinence, or weakness to the hips or legs.      Anxiety/Depression  Associated symptoms include: depressed mood and anxiety   Severity is described per patient : Moderate  Patient reports they are taking medications as prescribed and they are not having side effects.  History of Present Illness  The patient is a 54-year-old  female who presents for a Pap exam as well as follow-up on chronic pain management and anxiety.    She reports no new health concerns today. She does not report any breast lumps or bumps. She has not had any surgical procedures. She does not report any vaginal discharge or symptoms suggestive of a yeast infection.    She has been experiencing significant stress due to her daughter's ongoing divorce, which has led to an increase in her anxiety and depression symptoms. She has been taking additional doses of Klonopin and tramadol during the day, with a frequency of 3 to 4 days per week. She takes 2 tablets of tramadol at a time during the day. She takes 2 tablets of Klonopin daily, but not more than 2, and  does not report any excessive sedation. She has never taken any other medications for anxiety. She is currently on Abilify for depression, which was effective until the recent stressor. She is also taking Cymbalta, which she finds beneficial when taken during the day. She is seeking refills for her tramadol, clonazepam, Abilify, and gabapentin prescriptions. She takes 2 tablets of gabapentin 300 mg at night.    MEDICATIONS  tramadol, Klonopin, Abilify, gabapentin, Cymbalta      Patient Care Team:  Dilia Griffin MD as PCP - General (Family Medicine)   Current Outpatient Medications on File Prior to Visit   Medication Sig    acetaminophen (TYLENOL) 650 MG 8 hr tablet Take 2 tablets by mouth Every 8 (Eight) Hours As Needed for Mild Pain, Moderate Pain or Headache.    ARIPiprazole (ABILIFY) 2 MG tablet Take 1 tablet by mouth Daily.    aspirin (aspirin) 81 MG EC tablet Take 1 tablet by mouth Daily.    DULoxetine (CYMBALTA) 60 MG capsule Take 1 capsule by mouth Daily.    ergocalciferol (ERGOCALCIFEROL) 1.25 MG (31573 UT) capsule Take 1 capsule by mouth 2 (Two) Times a Week.    fluticasone (FLONASE) 50 MCG/ACT nasal spray Administer 2 sprays into the nostril(s) as directed by provider Daily.    hydroxychloroquine (PLAQUENIL) 200 MG tablet Take 1 tablet by mouth 2 (Two) Times a Day.    levothyroxine (SYNTHROID, LEVOTHROID) 137 MCG tablet TAKE 1 TABLET BY MOUTH DAILY    Multiple Vitamins-Minerals (CENTRUM ADULTS) tablet Take 1 tablet by mouth Daily.    naloxone (NARCAN) 4 MG/0.1ML nasal spray 1 spray into the nostril(s) as directed by provider As Needed (narcotic overdose).    omeprazole (priLOSEC) 40 MG capsule TAKE 1 CAPSULE BY MOUTH EVERY DAY    ondansetron (Zofran) 8 MG tablet Take 1 tablet by mouth Every 12 (Twelve) Hours As Needed for Nausea or Vomiting.    pilocarpine (SALAGEN) 5 MG tablet Take 1 tablet by mouth Every 8 (Eight) Hours.    promethazine (PHENERGAN) 25 MG tablet 1-2 q6h prn, likely sedating     "psyllium (Metamucil Smooth Texture) 58.6 % powder 1 teaspoon daily, increase by 1 teaspoon every 3 days to a max of 2 tablespoons    SUMAtriptan (Imitrex) 100 MG tablet Take 1 at onset of Migraine, may rpt in 2 hrs. Max 2 tab in 24h or 3 days/week    vitamin B-12 (CYANOCOBALAMIN) 1000 MCG tablet Take 1 tablet by mouth Every Other Day.    Carboxymethylcellul-Glycerin 0.5-0.9 % solution Apply 1 drop to eye(s) as directed by provider As Needed. (Patient not taking: Reported on 12/11/2024)    conjugated estrogens (Premarin) 0.625 MG/GM vaginal cream Insert  into the vagina 2 (Two) Times a Week. (Patient not taking: Reported on 12/11/2024)     No current facility-administered medications on file prior to visit.       Objective   Vital Signs:   /84 (BP Location: Right arm, Patient Position: Sitting, Cuff Size: Large Adult)   Pulse 102   Temp 98.2 °F (36.8 °C) (Temporal)   Resp 18   Ht 172.7 cm (68\")   Wt 103 kg (227 lb)   SpO2 98%   BMI 34.52 kg/m²    BP Readings from Last 3 Encounters:   12/11/24 132/84   09/11/24 128/82   05/08/24 110/68     Wt Readings from Last 3 Encounters:   12/11/24 103 kg (227 lb)   09/11/24 100 kg (220 lb 6.4 oz)   05/08/24 100 kg (221 lb)         Physical Exam  Vitals and nursing note reviewed.   Constitutional:       General: She is not in acute distress.     Appearance: She is well-developed.   HENT:      Head: Normocephalic and atraumatic.      Right Ear: External ear normal.      Left Ear: External ear normal.      Nose: Nose normal.   Eyes:      General: No scleral icterus.        Right eye: No discharge.         Left eye: No discharge.      Conjunctiva/sclera: Conjunctivae normal.      Pupils: Pupils are equal, round, and reactive to light.   Cardiovascular:      Rate and Rhythm: Normal rate and regular rhythm.      Heart sounds: No murmur heard.  Pulmonary:      Effort: Pulmonary effort is normal.      Breath sounds: No wheezing.      Comments: Breasts: breasts appear normal, " no suspicious masses, no skin or nipple changes or axillary nodes    Abdominal:      General: Bowel sounds are normal. There is no distension.      Palpations: Abdomen is soft. There is no mass.      Tenderness: There is no abdominal tenderness.      Hernia: No hernia is present.   Genitourinary:     Vagina: Normal. No vaginal discharge.      Adnexa:         Right: No tenderness.          Left: No tenderness.        Comments: Pelvic exam: normal external genitalia, vulva, vagina, cervix, uterus and adnexa  Pap obtained  Musculoskeletal:         General: Normal range of motion.      Cervical back: Normal range of motion.   Lymphadenopathy:      Cervical: No cervical adenopathy.   Skin:     General: Skin is warm and dry.   Neurological:      Mental Status: She is alert and oriented to person, place, and time.          Physical Exam      Office Visit on 12/11/2024   Component Date Value Ref Range Status    Diagnosis 12/11/2024 Comment   Final    NEGATIVE FOR INTRAEPITHELIAL LESION OR MALIGNANCY.    Specimen adequacy: 12/11/2024 Comment   Final    Comment: Satisfactory for evaluation.  Endocervical and/or squamous metaplastic  cells (endocervical component) are present.      Clinician Provided ICD-10: 12/11/2024 Comment   Final    Z12.4    Performed by: 12/11/2024 Comment   Final    Kamilla Sky, Cytotechnologist    . 12/11/2024 .   Final    Note: 12/11/2024 Comment   Final    Comment: The Pap smear is a screening test designed to aid in the detection of  premalignant and malignant conditions of the uterine cervix.  It is not a  diagnostic procedure and should not be used as the sole means of detecting  cervical cancer.  Both false-positive and false-negative reports do occur.      Method: 12/11/2024 Comment   Final    Comment: This liquid based ThinPrep(R) pap test was screened with the  use of an image guided system.      HPV Aptima 12/11/2024 Negative  Negative Final    Comment: This nucleic acid amplification  test detects fourteen high-risk  HPV types (16,18,31,33,35,39,45,51,52,56,58,59,66,68) without  differentiation.      HPV Genotype Reflex 12/11/2024 Comment   Final    Criteria not met, HPV Genotype not performed.    Chlamydia, Nuc. Acid Amp 12/11/2024 Negative  Negative Final    Gonococcus by Nucleic Acid Amp 12/11/2024 Negative  Negative Final     A1C Last 3 Results          2/7/2024    12:00   HGBA1C Last 3 Results   Hemoglobin A1C 5.8      Lab Results   Component Value Date    CHLPL 238 (H) 12/13/2022    TRIG 198 (H) 12/13/2022    HDL 47 12/13/2022     (H) 12/13/2022     Lab Results   Component Value Date    TSH 2.050 02/07/2024     Lab Results   Component Value Date    GLUCOSE 90 02/07/2024    BUN 11 02/07/2024    CREATININE 0.94 02/07/2024    EGFRIFNONA 78 01/03/2022    EGFRIFAFRI 90 01/03/2022    BCR 12 02/07/2024    K 5.0 02/07/2024    CO2 27 02/07/2024    CALCIUM 9.8 02/07/2024    PROTENTOTREF 7.7 02/07/2024    ALBUMIN 4.7 02/07/2024    LABIL2 1.6 02/07/2024    AST 29 02/07/2024    ALT 32 02/07/2024     Lab Results   Component Value Date    WBC 4.4 02/07/2024    HGB 14.9 02/07/2024    HCT 43.9 02/07/2024    MCV 92 02/07/2024     02/07/2024             Results  Imaging  Last mammogram was in March 2024, BI-RADS 1. DEXA scan was normal.             Assessment and Plan    Diagnoses and all orders for this visit:    1. Screening for cervical cancer (Primary)  -     IGP,CtNg,AptimaHPV,rfx16 / 18,45    2. Screening for STD (sexually transmitted disease)    3. Chronic pain syndrome  -     traMADol (ULTRAM) 50 MG tablet; 2 tablets at bedtime and 1 to 2 tablets daily as needed for chronic pain  Dispense: 112 tablet; Refill: 5    4. Fibromyalgia  -     traMADol (ULTRAM) 50 MG tablet; 2 tablets at bedtime and 1 to 2 tablets daily as needed for chronic pain  Dispense: 112 tablet; Refill: 5    5. Cervical disc disorder at C6-C7 level with radiculopathy  -     gabapentin (NEURONTIN) 300 MG capsule; Take 2  capsules by mouth Every Night. For pain  Dispense: 56 capsule; Refill: 5  -     traMADol (ULTRAM) 50 MG tablet; 2 tablets at bedtime and 1 to 2 tablets daily as needed for chronic pain  Dispense: 112 tablet; Refill: 5    6. Neck pain  -     traMADol (ULTRAM) 50 MG tablet; 2 tablets at bedtime and 1 to 2 tablets daily as needed for chronic pain  Dispense: 112 tablet; Refill: 5    7. Stress at home    8. Anxiety  -     clonazePAM (KlonoPIN) 0.5 MG tablet; Take 1 tablet by mouth 2 (Two) Times a Day As Needed for Anxiety.  Dispense: 56 tablet; Refill: 5    9. Class 1 obesity with body mass index (BMI) of 34.0 to 34.9 in adult, unspecified obesity type, unspecified whether serious comorbidity present        Assessment & Plan  1. Health maintenance.  Her last physical examination was conducted in February 2024. The most recent mammogram, performed in March 2024, yielded a BI-RADS 1 result. A DEXA scan was also conducted, which returned normal results. A mammogram will be scheduled for on or after 03/06/2025. The next DEXA scan is recommended at age 65 or earlier if there are any concerns. An HPV test will be ordered today. If the HPV test is negative, the next Pap smear can be deferred for 5 years.    2. Chronic pain management.  She is currently taking two tramadol at bedtime and 1-2 during the day as needed. She reports taking extra tramadol during the daytime, averaging three to four days a week. She is also taking gabapentin 300 mg, two at night. A prescription for 270 tablets of tramadol will be provided, which should last for 90 days based on her average usage. A prescription for 56 tablets of gabapentin will be provided, with refills to avoid frequent requests.    3. Anxiety.  She reports increased anxiety due to family stress and is currently taking Klonopin, no more than two tablets daily. She does not report any issues with over-sedation. A prescription for 56 tablets of Klonopin will be provided, with  instructions to take half a tablet if unsure about the need for a full dose.    4. Depression.  She is currently taking Abilify and Cymbalta, which have been helpful. She reports increased symptoms due to recent stress. Refills for Abilify and Cymbalta will be provided.      Medications Discontinued During This Encounter   Medication Reason    gabapentin (NEURONTIN) 300 MG capsule Reorder    clonazePAM (KlonoPIN) 0.5 MG tablet Reorder    traMADol (ULTRAM) 50 MG tablet Reorder         Follow Up     No follow-ups on file.    Patient was given instructions and counseling regarding her condition or for health maintenance advice. Please see specific information pulled into the AVS if appropriate.     Patient or patient representative verbalized consent for the use of Ambient Listening during the visit with  Dilia Griffin MD for chart documentation. 12/19/2024  17:53 EST

## 2024-12-11 ENCOUNTER — OFFICE VISIT (OUTPATIENT)
Dept: FAMILY MEDICINE CLINIC | Facility: CLINIC | Age: 54
End: 2024-12-11
Payer: COMMERCIAL

## 2024-12-11 VITALS
HEIGHT: 68 IN | DIASTOLIC BLOOD PRESSURE: 84 MMHG | RESPIRATION RATE: 18 BRPM | BODY MASS INDEX: 34.4 KG/M2 | WEIGHT: 227 LBS | OXYGEN SATURATION: 98 % | HEART RATE: 102 BPM | TEMPERATURE: 98.2 F | SYSTOLIC BLOOD PRESSURE: 132 MMHG

## 2024-12-11 DIAGNOSIS — G89.4 CHRONIC PAIN SYNDROME: ICD-10-CM

## 2024-12-11 DIAGNOSIS — Z12.4 SCREENING FOR CERVICAL CANCER: Primary | ICD-10-CM

## 2024-12-11 DIAGNOSIS — M79.7 FIBROMYALGIA: ICD-10-CM

## 2024-12-11 DIAGNOSIS — E66.811 CLASS 1 OBESITY WITH BODY MASS INDEX (BMI) OF 34.0 TO 34.9 IN ADULT, UNSPECIFIED OBESITY TYPE, UNSPECIFIED WHETHER SERIOUS COMORBIDITY PRESENT: ICD-10-CM

## 2024-12-11 DIAGNOSIS — M54.2 NECK PAIN: ICD-10-CM

## 2024-12-11 DIAGNOSIS — F41.9 ANXIETY: ICD-10-CM

## 2024-12-11 DIAGNOSIS — Z11.3 SCREENING FOR STD (SEXUALLY TRANSMITTED DISEASE): ICD-10-CM

## 2024-12-11 DIAGNOSIS — F43.9 STRESS AT HOME: ICD-10-CM

## 2024-12-11 DIAGNOSIS — M50.123 CERVICAL DISC DISORDER AT C6-C7 LEVEL WITH RADICULOPATHY: ICD-10-CM

## 2024-12-11 PROCEDURE — 99214 OFFICE O/P EST MOD 30 MIN: CPT | Performed by: FAMILY MEDICINE

## 2024-12-11 RX ORDER — GABAPENTIN 300 MG/1
600 CAPSULE ORAL NIGHTLY
Qty: 56 CAPSULE | Refills: 5 | Status: SHIPPED | OUTPATIENT
Start: 2024-12-11

## 2024-12-11 RX ORDER — CLONAZEPAM 0.5 MG/1
0.5 TABLET ORAL 2 TIMES DAILY PRN
Qty: 56 TABLET | Refills: 5 | Status: SHIPPED | OUTPATIENT
Start: 2024-12-11

## 2024-12-11 RX ORDER — TRAMADOL HYDROCHLORIDE 50 MG/1
TABLET ORAL
Qty: 112 TABLET | Refills: 5 | Status: SHIPPED | OUTPATIENT
Start: 2024-12-11

## 2024-12-16 LAB
C TRACH RRNA CVX QL NAA+PROBE: NEGATIVE
CYTOLOGIST CVX/VAG CYTO: NORMAL
CYTOLOGY CVX/VAG DOC CYTO: NORMAL
CYTOLOGY CVX/VAG DOC THIN PREP: NORMAL
DX ICD CODE: NORMAL
HPV GENOTYPE REFLEX: NORMAL
HPV I/H RISK 4 DNA CVX QL PROBE+SIG AMP: NEGATIVE
Lab: NORMAL
N GONORRHOEA RRNA CVX QL NAA+PROBE: NEGATIVE
OTHER STN SPEC: NORMAL
STAT OF ADQ CVX/VAG CYTO-IMP: NORMAL

## 2025-01-13 ENCOUNTER — PATIENT MESSAGE (OUTPATIENT)
Dept: FAMILY MEDICINE CLINIC | Facility: CLINIC | Age: 55
End: 2025-01-13
Payer: COMMERCIAL

## 2025-01-13 DIAGNOSIS — E03.9 ACQUIRED HYPOTHYROIDISM: ICD-10-CM

## 2025-01-13 DIAGNOSIS — E53.8 B12 DEFICIENCY: Primary | ICD-10-CM

## 2025-01-13 NOTE — TELEPHONE ENCOUNTER
As per reply to patient can you get Dr. Chan's January 8 consultation note, including thyroid labs.

## 2025-01-14 DIAGNOSIS — M79.7 FIBROMYALGIA: ICD-10-CM

## 2025-01-14 DIAGNOSIS — M50.123 CERVICAL DISC DISORDER AT C6-C7 LEVEL WITH RADICULOPATHY: ICD-10-CM

## 2025-01-14 DIAGNOSIS — F41.9 ANXIETY: ICD-10-CM

## 2025-01-14 DIAGNOSIS — M54.2 NECK PAIN: ICD-10-CM

## 2025-01-14 DIAGNOSIS — G89.4 CHRONIC PAIN SYNDROME: ICD-10-CM

## 2025-01-16 NOTE — TELEPHONE ENCOUNTER
Patient returned call.    Also is needing a PA and a new script for tramadol and B12. She has switched pharmacies to [facility].

## 2025-01-17 ENCOUNTER — TELEPHONE (OUTPATIENT)
Dept: FAMILY MEDICINE CLINIC | Facility: CLINIC | Age: 55
End: 2025-01-17
Payer: COMMERCIAL

## 2025-01-17 DIAGNOSIS — M79.7 FIBROMYALGIA: ICD-10-CM

## 2025-01-17 DIAGNOSIS — G89.4 CHRONIC PAIN SYNDROME: ICD-10-CM

## 2025-01-17 DIAGNOSIS — M54.2 NECK PAIN: ICD-10-CM

## 2025-01-17 DIAGNOSIS — M50.123 CERVICAL DISC DISORDER AT C6-C7 LEVEL WITH RADICULOPATHY: ICD-10-CM

## 2025-01-17 RX ORDER — TRAMADOL HYDROCHLORIDE 50 MG/1
TABLET ORAL
Qty: 112 TABLET | Refills: 5 | Status: SHIPPED | OUTPATIENT
Start: 2025-01-17

## 2025-01-17 RX ORDER — DULOXETIN HYDROCHLORIDE 60 MG/1
60 CAPSULE, DELAYED RELEASE ORAL DAILY
Qty: 90 CAPSULE | Refills: 1 | Status: SHIPPED | OUTPATIENT
Start: 2025-01-17

## 2025-01-17 RX ORDER — TRAMADOL HYDROCHLORIDE 50 MG/1
TABLET ORAL
Qty: 112 TABLET | Refills: 5 | Status: SHIPPED | OUTPATIENT
Start: 2025-01-17 | End: 2025-01-17

## 2025-01-17 RX ORDER — LEVOTHYROXINE SODIUM 150 UG/1
150 TABLET ORAL DAILY
Qty: 90 TABLET | Refills: 1 | Status: SHIPPED | OUTPATIENT
Start: 2025-01-17

## 2025-01-17 NOTE — TELEPHONE ENCOUNTER
Spoke with the patient as I was not sure what the previous encounter was asking for. She said that she switched insurance to [employer] so she needs a PA on Tramadol and B12. She also needs new prescriptions sent to [facility] in [place] for the Tramadol and Cymbalta. I advised that she send a picture through [employer] of her new insurance card because I will need that information to complete new PA's, she voiced understanding. Please advise refill requests pended below.

## 2025-03-04 NOTE — PROGRESS NOTES
Chief Complaint  Pain, Anxiety, and Vaginitis    History of Present Illness  Barbara Carter presents today for follow up on pain management and anxiety and new onset concern of vaginitis    Pain Management   Patient states medications are working well.   Aggravating factors include any weight bearing and inactivity  Pain scale today is 5.   Inspect done on 3/4/25.  Patient pain contract is up to date. Last done on 9/11/24.  Last UDS done on 9/11/24.      Back Pain:   Patient complains of lumbar spine pain which is described as aching.  Patient denies numbness/tingling which does not radiate to legs.   She reports that the pain has been present for several years.  She reports stiffness in lower back after waking up in the morning .  Symptoms are relieved by  Gabapentin and Tramadol .The back problem is not work related.  She denies fever, bladder or bowel incontinence, or weakness to the hips or legs.       Anxiety/Depression  Associated symptoms include: anxiety, insomnia   Severity is described per patient : Moderate  Patient reports they are taking medications as prescribed and they are not having side effects.    Vaginitis: Patient complains of an abnormal vaginal discharge for  1.5  weeks. Vaginal symptoms include discharge described as white, local irritation, and vulvar itching. STI Risk: none. Discharge described as: thick. Patient reports recently being on two rounds of antibiotics after a dental procedure. Tried OTC monistat with no relief.    History of Present Illness        Patient Care Team:  Dilia Griffin MD as PCP - General (Family Medicine)   Current Outpatient Medications on File Prior to Visit   Medication Sig    acetaminophen (TYLENOL) 650 MG 8 hr tablet Take 2 tablets by mouth Every 8 (Eight) Hours As Needed for Mild Pain, Moderate Pain or Headache.    ARIPiprazole (ABILIFY) 2 MG tablet Take 1 tablet by mouth Daily.    aspirin (aspirin) 81 MG EC tablet Take 1 tablet by mouth Daily.     clonazePAM (KlonoPIN) 0.5 MG tablet Take 1 tablet by mouth 2 (Two) Times a Day As Needed for Anxiety.    DULoxetine (CYMBALTA) 60 MG capsule Take 1 capsule by mouth Daily.    ergocalciferol (ERGOCALCIFEROL) 1.25 MG (41931 UT) capsule Take 1 capsule by mouth 2 (Two) Times a Week.    fluticasone (FLONASE) 50 MCG/ACT nasal spray Administer 2 sprays into the nostril(s) as directed by provider Daily.    gabapentin (NEURONTIN) 300 MG capsule Take 2 capsules by mouth Every Night. For pain    hydroxychloroquine (PLAQUENIL) 200 MG tablet Take 1 tablet by mouth 2 (Two) Times a Day.    levothyroxine (SYNTHROID, LEVOTHROID) 150 MCG tablet Take 1 tablet by mouth Daily.    Multiple Vitamins-Minerals (CENTRUM ADULTS) tablet Take 1 tablet by mouth Daily.    naloxone (NARCAN) 4 MG/0.1ML nasal spray 1 spray into the nostril(s) as directed by provider As Needed (narcotic overdose).    omeprazole (priLOSEC) 40 MG capsule TAKE 1 CAPSULE BY MOUTH EVERY DAY    ondansetron (Zofran) 8 MG tablet Take 1 tablet by mouth Every 12 (Twelve) Hours As Needed for Nausea or Vomiting.    pilocarpine (SALAGEN) 5 MG tablet Take 1 tablet by mouth Every 8 (Eight) Hours.    promethazine (PHENERGAN) 25 MG tablet 1-2 q6h prn, likely sedating    psyllium (Metamucil Smooth Texture) 58.6 % powder 1 teaspoon daily, increase by 1 teaspoon every 3 days to a max of 2 tablespoons    SUMAtriptan (Imitrex) 100 MG tablet Take 1 at onset of Migraine, may rpt in 2 hrs. Max 2 tab in 24h or 3 days/week    traMADol (ULTRAM) 50 MG tablet 1-2 tablets bid if needed for pain    vitamin B-12 (CYANOCOBALAMIN) 1000 MCG tablet Take 1 tablet by mouth Every Other Day.    Carboxymethylcellul-Glycerin 0.5-0.9 % solution Apply 1 drop to eye(s) as directed by provider As Needed. (Patient not taking: Reported on 9/11/2024)    conjugated estrogens (Premarin) 0.625 MG/GM vaginal cream Insert  into the vagina 2 (Two) Times a Week. (Patient not taking: Reported on 9/11/2024)     No current  "facility-administered medications on file prior to visit.       Objective   Vital Signs:   /82 (BP Location: Right arm, Patient Position: Sitting, Cuff Size: Large Adult)   Pulse 82   Temp 97.7 °F (36.5 °C) (Temporal)   Resp 18   Ht 172.7 cm (68\")   Wt 109 kg (240 lb)   SpO2 96%   BMI 36.49 kg/m²    BP Readings from Last 3 Encounters:   03/05/25 122/82   12/11/24 132/84   09/11/24 128/82     Wt Readings from Last 3 Encounters:   03/05/25 109 kg (240 lb)   12/11/24 103 kg (227 lb)   09/11/24 100 kg (220 lb 6.4 oz)         Physical Exam     Physical Exam        No visits with results within 1 Day(s) from this visit.   Latest known visit with results is:   Office Visit on 12/11/2024   Component Date Value Ref Range Status    Diagnosis 12/11/2024 Comment   Final    NEGATIVE FOR INTRAEPITHELIAL LESION OR MALIGNANCY.    Specimen adequacy: 12/11/2024 Comment   Final    Comment: Satisfactory for evaluation.  Endocervical and/or squamous metaplastic  cells (endocervical component) are present.      Clinician Provided ICD-10: 12/11/2024 Comment   Final    Z12.4    Performed by: 12/11/2024 Comment   Final    Kamilla Sky, Cytotechnologist    . 12/11/2024 .   Final    Note: 12/11/2024 Comment   Final    Comment: The Pap smear is a screening test designed to aid in the detection of  premalignant and malignant conditions of the uterine cervix.  It is not a  diagnostic procedure and should not be used as the sole means of detecting  cervical cancer.  Both false-positive and false-negative reports do occur.      Method: 12/11/2024 Comment   Final    Comment: This liquid based ThinPrep(R) pap test was screened with the  use of an image guided system.      HPV Aptima 12/11/2024 Negative  Negative Final    Comment: This nucleic acid amplification test detects fourteen high-risk  HPV types (16,18,31,33,35,39,45,51,52,56,58,59,66,68) without  differentiation.      HPV Genotype Reflex 12/11/2024 Comment   Final    " Criteria not met, HPV Genotype not performed.    Chlamydia, Nuc. Acid Amp 12/11/2024 Negative  Negative Final    Gonococcus by Nucleic Acid Amp 12/11/2024 Negative  Negative Final       Lab Results   Component Value Date    CHLPL 238 (H) 12/13/2022    TRIG 198 (H) 12/13/2022    HDL 47 12/13/2022     (H) 12/13/2022     Lab Results   Component Value Date    TSH 2.050 02/07/2024     Lab Results   Component Value Date    GLUCOSE 90 02/07/2024    BUN 11 02/07/2024    CREATININE 0.94 02/07/2024    EGFRIFNONA 78 01/03/2022    EGFRIFAFRI 90 01/03/2022    BCR 12 02/07/2024    K 5.0 02/07/2024    CO2 27 02/07/2024    CALCIUM 9.8 02/07/2024    ALBUMIN 4.7 02/07/2024    AST 29 02/07/2024    ALT 32 02/07/2024     Lab Results   Component Value Date    WBC 4.4 02/07/2024    HGB 14.9 02/07/2024    HCT 43.9 02/07/2024    MCV 92 02/07/2024     02/07/2024       {Ambulatory Labs (Optional):77639}  {ASCVD Risk (Optional):87290}    Results               Assessment and Plan {CC Problem List  Visit Diagnosis  ROS  Review (Popup)  Health Maintenance  Quality  BestPractice  Medications  SmartSets  SnapShot Encounters  Media :23}   There are no diagnoses linked to this encounter.    Assessment & Plan        There are no discontinued medications.    {Time Spent (Optional):76541}  Follow Up {Instructions Charge Capture  Follow-up Communications :23}    No follow-ups on file.    Patient was given instructions and counseling regarding her condition or for health maintenance advice. Please see specific information pulled into the AVS if appropriate.     {[employer] Provider Statement:00662}

## 2025-03-05 ENCOUNTER — OFFICE VISIT (OUTPATIENT)
Dept: FAMILY MEDICINE CLINIC | Facility: CLINIC | Age: 55
End: 2025-03-05
Payer: COMMERCIAL

## 2025-03-05 VITALS
BODY MASS INDEX: 36.37 KG/M2 | HEIGHT: 68 IN | OXYGEN SATURATION: 96 % | DIASTOLIC BLOOD PRESSURE: 82 MMHG | WEIGHT: 240 LBS | HEART RATE: 82 BPM | SYSTOLIC BLOOD PRESSURE: 122 MMHG | TEMPERATURE: 97.7 F | RESPIRATION RATE: 18 BRPM

## 2025-03-05 DIAGNOSIS — Z12.31 ENCOUNTER FOR SCREENING MAMMOGRAM FOR BREAST CANCER: ICD-10-CM

## 2025-03-05 DIAGNOSIS — M79.7 FIBROMYALGIA: ICD-10-CM

## 2025-03-05 DIAGNOSIS — M50.123 CERVICAL DISC DISORDER AT C6-C7 LEVEL WITH RADICULOPATHY: ICD-10-CM

## 2025-03-05 DIAGNOSIS — E03.9 ACQUIRED HYPOTHYROIDISM: ICD-10-CM

## 2025-03-05 DIAGNOSIS — E55.9 VITAMIN D DEFICIENCY: ICD-10-CM

## 2025-03-05 DIAGNOSIS — B37.31 CANDIDAL VAGINITIS: ICD-10-CM

## 2025-03-05 DIAGNOSIS — M35.05 SJOGREN SYNDROME WITH INFLAMMATORY ARTHRITIS: ICD-10-CM

## 2025-03-05 DIAGNOSIS — F43.9 STRESS AT HOME: ICD-10-CM

## 2025-03-05 DIAGNOSIS — G89.4 CHRONIC PAIN SYNDROME: ICD-10-CM

## 2025-03-05 DIAGNOSIS — R73.09 ELEVATED HEMOGLOBIN A1C: ICD-10-CM

## 2025-03-05 DIAGNOSIS — F34.1 PERSISTENT DEPRESSIVE DISORDER: ICD-10-CM

## 2025-03-05 DIAGNOSIS — Z00.00 ANNUAL PHYSICAL EXAM: Primary | ICD-10-CM

## 2025-03-05 DIAGNOSIS — F41.9 ANXIETY: ICD-10-CM

## 2025-03-05 DIAGNOSIS — Z13.220 SCREENING FOR CHOLESTEROL LEVEL: ICD-10-CM

## 2025-03-05 DIAGNOSIS — E53.8 B12 DEFICIENCY: ICD-10-CM

## 2025-03-05 RX ORDER — ARIPIPRAZOLE 2 MG/1
1 TABLET ORAL DAILY
Start: 2025-03-05 | End: 2025-03-12

## 2025-03-05 RX ORDER — FLUCONAZOLE 150 MG/1
TABLET ORAL
Qty: 2 TABLET | Refills: 1 | Status: SHIPPED | OUTPATIENT
Start: 2025-03-05

## 2025-03-05 NOTE — PROGRESS NOTES
Chief Complaint   Patient presents with    Pain    Anxiety    Vaginitis    Annual Exam       History of Present Illness  Subjective   Barbara Carter is a 54 y.o. female.     History of Present Illness  The patient is here for an annual exam, pain management for chronic back pain, follow-up on anxiety, depression, and insomnia, and a new complaint of vaginitis.    She suspects a yeast infection, which she attributes to the use of antibiotics for a dental abscess. The infection manifested during the second course of antibiotics, presenting with symptoms of white discharge, itching, and irritation. She has not tried any other treatment options. She completed her antibiotic course on Sunday and does not anticipate the need for further antibiotics. Despite some relief from Monistat, the infection persists. She has previously responded well to Diflucan, with the last treatment involving 2 doses.    She is currently experiencing significant stress due to her daughter's divorce, which is impacting her grandchildren. She continues to care for her 3 grandchildren, with the oldest attending  3 days a week. She reports that her anxiety is more severe than her depression. She has previously taken BuSpar many years ago but does not remember if it was effective. She finds that additional Klonopin during the day helps manage her symptoms when she is feeling overwhelmed. Her anxiety and depression are managed with daily doses of Cymbalta and Abilify, which she reports as effective. She also takes Klonopin once daily and at bedtime, with an additional dose during the day 3 or 4 times a week when her anxiety intensifies.    She has gained weight since starting Abilify but is reluctant to discontinue it due to its effectiveness. She does not have a regular exercise routine but remains active while caring for her grandchildren. She has attempted a low-carb diet but found it challenging. She has no history of hypertension or  anemia.    She is due for a mammogram. She has not yet received the pneumonia or shingles vaccines. She is currently taking vitamin D and B12 supplements. She had an elevated A1c level last year. She has not experienced high blood pressure or anemia.    She is on nightly gabapentin 300 mg for pain management and takes tramadol 2 tablets at bedtime and 1 to 2 tablets during the day as needed, approximately 3 days a week. She has discontinued oxycodone quite sometime ago. She reports no side effects from tramadol but prefers not to rely on it.    She believes her thyroid levels are slightly abnormal and recalls a medication adjustment at her last visit. She has not missed any doses of her thyroid medication.    She continues to take Plaquenil and undergoes lab work with rheumatology every 6 months.    MEDICATIONS  Current: Cymbalta, Abilify, Klonopin, gabapentin, tramadol, Plaquenil, vitamin D, vitamin B12  Past: BuSpar      The patient is here: for coordination of medical care.  Last comprehensive physical was on 2/7/24.  Previous physical was performed by  Dilia Griffin MD*.  Overall has: moderate activity with work/home activities, good appetite, feels well with minor complaints, good energy level, and is sleeping well. Nutrition: balanced diet. Last tetanus shot was  8/24/19 . Patient is doing routine self breast exams: occasionally    Pain Management   Patient states medications are working well.   Aggravating factors include any weight bearing and inactivity  Pain scale today is 5.   Inspect done on 3/4/25.  Patient pain contract is up to date. Last done on 9/11/24.  Last UDS done on 9/11/24.      Back Pain:   Patient complains of lumbar spine pain which is described as aching.  Patient denies numbness/tingling which does not radiate to legs.   She reports that the pain has been present for several years.  She reports stiffness in lower back after waking up in the morning .  Symptoms are relieved by   Gabapentin and Tramadol .The back problem is not work related.  She denies fever, bladder or bowel incontinence, or weakness to the hips or legs.       Anxiety/Depression  Associated symptoms include: anxiety, insomnia   Severity is described per patient : Moderate  Patient reports they are taking medications as prescribed and they are not having side effects.    Vaginitis: Patient complains of an abnormal vaginal discharge for 1.5 weeks. Vaginal symptoms include discharge described as white, local irritation, and vulvar itching. STI Risk: none. Discharge described as: thick. Patient reports recently being on two rounds of antibiotics after a dental procedure. Tried OTC monistat with no relief.    Health Maintenance   Topic Date Due    Pneumococcal Vaccine 50+ (1 of 1 - PCV) Never done    ZOSTER VACCINE (1 of 2) Never done    LIPID PANEL  12/13/2023    ANNUAL PHYSICAL  02/07/2025    BMI FOLLOWUP  09/11/2025    MAMMOGRAM  03/06/2026    TDAP/TD VACCINES (2 - Td or Tdap) 08/24/2029    PAP SMEAR  12/11/2029    COLORECTAL CANCER SCREENING  08/14/2031    HEPATITIS C SCREENING  Completed    INFLUENZA VACCINE  Completed    COVID-19 Vaccine  Discontinued       Recent Hospitalizations:  No hospitalization(s) within the last year..  ccc      I personally reviewed and updated the patient's allergies, medications, problem list, and past medical, surgical, social, and family history. I have reviewed and confirmed the accuracy of the HPI and ROS as documented by the MA/LPN/RN Dilia Griffin MD    Family History   Problem Relation Age of Onset    Thyroid disease Mother     Diabetes Mother         DMII    Heart failure Mother     Hypertension Mother     Stroke Father     Arthritis Father     Cancer Sister         bladder cancer    Colon cancer Paternal Grandfather     Breast cancer Maternal Cousin     Breast cancer Paternal Cousin        Social History     Tobacco Use    Smoking status: Never     Passive exposure: Never     Smokeless tobacco: Never   Vaping Use    Vaping status: Never Used   Substance Use Topics    Alcohol use: No    Drug use: Never       Past Surgical History:   Procedure Laterality Date    APPENDECTOMY       SECTION      CHOLECYSTECTOMY         Patient Active Problem List   Diagnosis    Cervical lymphadenopathy    Fibromyalgia    Chronic headache    Gastroesophageal reflux disease    Hyperlipidemia    Hypothyroidism    Polyarthralgia    Prolonged QT interval    Sjogren's syndrome    Tricuspid valve regurgitation    Vitamin D deficiency    Lymphadenopathy    Cervical disc disorder at C6-C7 level with radiculopathy    Neck pain    Chronic bilateral low back pain without sciatica    Chronic pain syndrome    Anxiety    Other acute recurrent sinusitis    Amenorrhea    Allergic rhinitis    Abnormal computed tomography scan    Acute mesenteric adenitis    Post-COVID-19 syndrome    Nausea    Mucositis    Nasal burning    Acute otalgia, left    TMJ (sprain of temporomandibular joint)    Dysuria    Complaints of total body pain    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    Sjogren-Korey syndrome    Myalgia    Persistent depressive disorder    Other fatigue    Breast discharge    Annual physical exam    Breast asymmetry in female    Galactorrhea on both sides    Sialadenitis    Salivary stones    Body mass index (BMI) of 36.0 to 36.9 in adult    Elevated hemoglobin A1c    B12 deficiency    Stress at home         Current Outpatient Medications:     acetaminophen (TYLENOL) 650 MG 8 hr tablet, Take 2 tablets by mouth Every 8 (Eight) Hours As Needed for Mild Pain, Moderate Pain or Headache., Disp: , Rfl:     ARIPiprazole (ABILIFY) 2 MG tablet, Take 0.5 tablets by mouth Daily for 7 days., Disp: , Rfl:     aspirin (aspirin) 81 MG EC tablet, Take 1 tablet by mouth Daily., Disp: , Rfl:     clonazePAM (KlonoPIN) 0.5 MG tablet, Take 1 tablet by mouth 2 (Two) Times a Day As Needed for  Anxiety., Disp: 56 tablet, Rfl: 5    DULoxetine (CYMBALTA) 60 MG capsule, Take 1 capsule by mouth Daily., Disp: 90 capsule, Rfl: 1    ergocalciferol (ERGOCALCIFEROL) 1.25 MG (54171 UT) capsule, Take 1 capsule by mouth 2 (Two) Times a Week., Disp: 24 capsule, Rfl: 3    fluticasone (FLONASE) 50 MCG/ACT nasal spray, Administer 2 sprays into the nostril(s) as directed by provider Daily., Disp: , Rfl:     gabapentin (NEURONTIN) 300 MG capsule, Take 2 capsules by mouth Every Night. For pain, Disp: 56 capsule, Rfl: 5    hydroxychloroquine (PLAQUENIL) 200 MG tablet, Take 1 tablet by mouth 2 (Two) Times a Day., Disp: , Rfl:     levothyroxine (SYNTHROID, LEVOTHROID) 150 MCG tablet, Take 1 tablet by mouth Daily., Disp: 90 tablet, Rfl: 1    Multiple Vitamins-Minerals (CENTRUM ADULTS) tablet, Take 1 tablet by mouth Daily., Disp: , Rfl:     naloxone (NARCAN) 4 MG/0.1ML nasal spray, 1 spray into the nostril(s) as directed by provider As Needed (narcotic overdose)., Disp: 1 each, Rfl: 1    omeprazole (priLOSEC) 40 MG capsule, TAKE 1 CAPSULE BY MOUTH EVERY DAY, Disp: 30 capsule, Rfl: 5    ondansetron (Zofran) 8 MG tablet, Take 1 tablet by mouth Every 12 (Twelve) Hours As Needed for Nausea or Vomiting., Disp: 30 tablet, Rfl: 3    pilocarpine (SALAGEN) 5 MG tablet, Take 1 tablet by mouth Every 8 (Eight) Hours., Disp: , Rfl:     promethazine (PHENERGAN) 25 MG tablet, 1-2 q6h prn, likely sedating, Disp: 20 tablet, Rfl: 5    psyllium (Metamucil Smooth Texture) 58.6 % powder, 1 teaspoon daily, increase by 1 teaspoon every 3 days to a max of 2 tablespoons, Disp: 425 g, Rfl: 12    SUMAtriptan (Imitrex) 100 MG tablet, Take 1 at onset of Migraine, may rpt in 2 hrs. Max 2 tab in 24h or 3 days/week, Disp: 18 tablet, Rfl: 11    traMADol (ULTRAM) 50 MG tablet, 1-2 tablets bid if needed for pain, Disp: 112 tablet, Rfl: 5    vitamin B-12 (CYANOCOBALAMIN) 1000 MCG tablet, Take 1 tablet by mouth Every Other Day., Disp: 45 tablet, Rfl: 3     "Carboxymethylcellul-Glycerin 0.5-0.9 % solution, Apply 1 drop to eye(s) as directed by provider As Needed. (Patient not taking: Reported on 9/11/2024), Disp: , Rfl:     conjugated estrogens (Premarin) 0.625 MG/GM vaginal cream, Insert  into the vagina 2 (Two) Times a Week. (Patient not taking: Reported on 9/11/2024), Disp: 30 g, Rfl: 12    fluconazole (Diflucan) 150 MG tablet, 1 now repeat in 1 week if needed, Disp: 2 tablet, Rfl: 1    Objective   /82 (BP Location: Right arm, Patient Position: Sitting, Cuff Size: Large Adult)   Pulse 82   Temp 97.7 °F (36.5 °C) (Temporal)   Resp 18   Ht 172.7 cm (68\")   Wt 109 kg (240 lb)   LMP  (LMP Unknown)   SpO2 96%   BMI 36.49 kg/m²   BP Readings from Last 3 Encounters:   03/05/25 122/82   12/11/24 132/84   09/11/24 128/82     Wt Readings from Last 3 Encounters:   03/05/25 109 kg (240 lb)   12/11/24 103 kg (227 lb)   09/11/24 100 kg (220 lb 6.4 oz)     Physical Exam  Vitals and nursing note reviewed.   Constitutional:       General: She is not in acute distress.     Appearance: Normal appearance. She is well-developed. She is obese. She is not ill-appearing.   HENT:      Head: Normocephalic and atraumatic.   Neck:      Comments: No thyromegaly or thyroid masses  Cardiovascular:      Rate and Rhythm: Normal rate and regular rhythm.      Heart sounds: No murmur heard.  Pulmonary:      Effort: Pulmonary effort is normal.      Breath sounds: Normal breath sounds. No wheezing.   Musculoskeletal:         General: Normal range of motion.      Cervical back: Neck supple.   Skin:     General: Skin is warm and dry.      Findings: No rash.   Neurological:      Mental Status: She is alert and oriented to person, place, and time.   Psychiatric:         Mood and Affect: Mood normal.         Behavior: Behavior normal.         Thought Content: Thought content normal.         Physical Exam      Data / Lab Results:  Hemoglobin A1C   Date Value Ref Range Status   02/07/2024 5.8 (H) " 4.8 - 5.6 % Final     Comment:              Prediabetes: 5.7 - 6.4           Diabetes: >6.4           Glycemic control for adults with diabetes: <7.0             Results  Laboratory Studies  Pap smear was negative for intraepithelial lesion or malignancy and HPV was negative on 12/11/2024. A1c was 5.8 last year.    Age-appropriate Screening Schedule:  Refer to the list below for future screening recommendations based on patient's age, sex and/or medical conditions. Orders for these recommended tests are listed in the plan section. The patient has been provided with a written plan.      Assessment & Plan      Medications        Problem List         LOS      Diagnoses and all orders for this visit:    1. Annual physical exam (Primary)    2. Chronic pain syndrome    3. Fibromyalgia    4. Candidal vaginitis  -     fluconazole (Diflucan) 150 MG tablet; 1 now repeat in 1 week if needed  Dispense: 2 tablet; Refill: 1    5. Cervical disc disorder at C6-C7 level with radiculopathy    6. Anxiety    7. Body mass index (BMI) of 36.0 to 36.9 in adult    8. Acquired hypothyroidism  -     TSH  -     T4, Free    9. Vitamin D deficiency  -     Vitamin D,25-Hydroxy    10. Elevated hemoglobin A1c  -     Hemoglobin A1c  -     Comprehensive Metabolic Panel  -     CBC & Differential    11. B12 deficiency  -     CBC & Differential  -     Vitamin B12    12. Screening for cholesterol level  -     Lipid Panel    13. Encounter for screening mammogram for breast cancer  -     Mammo Screening Digital Tomosynthesis Bilateral With CAD; Future    14. Persistent depressive disorder  -     ARIPiprazole (ABILIFY) 2 MG tablet; Take 0.5 tablets by mouth Daily for 7 days.    15. Sjogren syndrome with inflammatory arthritis    16. Stress at home        Assessment & Plan  1. Vaginitis.   She reported symptoms of white discharge, itching, and irritation, which began during the second round of antibiotics for an abscessed tooth. Monistat provided some  relief but did not completely clear the infection. A prescription for Diflucan 150 mg will be provided, with instructions to take one tablet initially and the second tablet after a week if symptoms persist. If symptoms resolve, the second tablet can be saved for future use if symptoms recur.    2. Anxiety and depression.  She is currently taking Cymbalta and Abilify daily, which are controlling her symptoms well. She also takes Klonopin, usually one tablet at bedtime and occasionally during the day as needed. She will reduce her Abilify dosage to half a tablet for the next week. If there is no worsening of her anxiety or depression symptoms, she will discontinue Abilify. If symptoms worsen, she will revert to her previous dosage.    3. Insomnia.  She is currently taking Cymbalta and Abilify daily, which are controlling her symptoms well. She also takes Klonopin, usually one tablet at bedtime and occasionally during the day as needed. She will reduce her Abilify dosage to half a tablet for the next week. If there is no worsening of her anxiety or depression symptoms, she will discontinue Abilify. If symptoms worsen, she will revert to her previous dosage.    4. Chronic back pain.  She is currently taking gabapentin 300 mg at night and tramadol 50 mg, two tablets at bedtime and 1-2 tablets during the day as needed, approximately three days a week. She does not report any side effects from these medications. No changes to her current pain management regimen are necessary at this time.    5. Obesity.  Her BMI has increased to 36, with a weight increase of 13 pounds over the past 3 months and 20 pounds over the past 6 months.  Suspect this is partially due to Abilify but also dietary and lifestyle changes.  We are reducing and possibly discontinuing Abilify.  She is advised to engage in physical activity for 150 minutes per week, including walking and bike riding. Dietary recommendations include increasing intake of  vegetables, fruits, whole grains, and lean meats, while reducing consumption of fatty foods, pastas, potatoes, cheeses, nuts, butters, and sauces. She is also advised to consider a low-carb diet and aim for a daily caloric intake of 1200 to 1500 calories, without going below 1200 calories.    6. Health maintenance.  Her Pap smear results from 12/11/2024 were negative for intraepithelial lesion or malignancy, and HPV was also negative. Her blood pressure is within normal range. A mammogram will be ordered. Laboratory tests will be conducted today, including TSH, T4, B12, lipid panel, CMP, and CBC. She is advised to monitor her dietary sugar intake and maintain physical activity. She is also encouraged to receive the pneumonia and shingles vaccines.    7. Elevated A1c.  Her A1c level was slightly above the normal range last year, at 5.8. Her A1c level will be rechecked to ensure it is not increasing. She is advised to monitor her dietary sugar intake and maintain physical activity.    8. Thyroid disorder.  A TSH and T4 test will be ordered to monitor her thyroid function. She has not been missing doses of her thyroid medication.  Will adjust dose if indicated.    9. Rheumatology follow-up.  She continues to take Plaquenil and undergoes lab work with rheumatology every 6 months.    Follow-up  The patient will follow up in 3 months.    The patient was counseled regarding nutrition, physical activity, healthy weight, injury prevention, immunizations and preventative health screenings.      No follow-ups on file.      Expected course, medications, and adverse effects discussed.    Call or return if worsening or persistent symptoms.   Hand hygiene was performed before and after exam.   The complete contents of the Assessment and Plan and Data / Lab Results as documented above have been reviewed and addressed by myself with the patient today as part of an ongoing evaluation / treatment plan.    If separate E/M service has  been provided today it was significant, medically necessary, and separately identifiable.           Patient or patient representative verbalized consent for the use of Ambient Listening during the visit with  Dilia Griffin MD for chart documentation. 3/5/2025  11:07 EST

## 2025-03-06 LAB
25(OH)D3+25(OH)D2 SERPL-MCNC: 49.5 NG/ML (ref 30–100)
ALBUMIN SERPL-MCNC: 4.4 G/DL (ref 3.8–4.9)
ALP SERPL-CCNC: 86 IU/L (ref 44–121)
ALT SERPL-CCNC: 34 IU/L (ref 0–32)
AST SERPL-CCNC: 35 IU/L (ref 0–40)
BASOPHILS # BLD AUTO: 0 X10E3/UL (ref 0–0.2)
BASOPHILS NFR BLD AUTO: 1 %
BILIRUB SERPL-MCNC: 0.3 MG/DL (ref 0–1.2)
BUN SERPL-MCNC: 12 MG/DL (ref 6–24)
BUN/CREAT SERPL: 13 (ref 9–23)
CALCIUM SERPL-MCNC: 9.6 MG/DL (ref 8.7–10.2)
CHLORIDE SERPL-SCNC: 103 MMOL/L (ref 96–106)
CHOLEST SERPL-MCNC: 242 MG/DL (ref 100–199)
CO2 SERPL-SCNC: 22 MMOL/L (ref 20–29)
CREAT SERPL-MCNC: 0.94 MG/DL (ref 0.57–1)
EGFRCR SERPLBLD CKD-EPI 2021: 72 ML/MIN/1.73
EOSINOPHIL # BLD AUTO: 0.1 X10E3/UL (ref 0–0.4)
EOSINOPHIL NFR BLD AUTO: 3 %
ERYTHROCYTE [DISTWIDTH] IN BLOOD BY AUTOMATED COUNT: 12.2 % (ref 11.7–15.4)
GLOBULIN SER CALC-MCNC: 2.7 G/DL (ref 1.5–4.5)
GLUCOSE SERPL-MCNC: 95 MG/DL (ref 70–99)
HBA1C MFR BLD: 5.9 % (ref 4.8–5.6)
HCT VFR BLD AUTO: 40.4 % (ref 34–46.6)
HDLC SERPL-MCNC: 51 MG/DL
HGB BLD-MCNC: 14 G/DL (ref 11.1–15.9)
IMM GRANULOCYTES # BLD AUTO: 0 X10E3/UL (ref 0–0.1)
IMM GRANULOCYTES NFR BLD AUTO: 0 %
LDLC SERPL CALC-MCNC: 158 MG/DL (ref 0–99)
LYMPHOCYTES # BLD AUTO: 1 X10E3/UL (ref 0.7–3.1)
LYMPHOCYTES NFR BLD AUTO: 27 %
MCH RBC QN AUTO: 32.1 PG (ref 26.6–33)
MCHC RBC AUTO-ENTMCNC: 34.7 G/DL (ref 31.5–35.7)
MCV RBC AUTO: 93 FL (ref 79–97)
MONOCYTES # BLD AUTO: 0.6 X10E3/UL (ref 0.1–0.9)
MONOCYTES NFR BLD AUTO: 16 %
NEUTROPHILS # BLD AUTO: 1.9 X10E3/UL (ref 1.4–7)
NEUTROPHILS NFR BLD AUTO: 53 %
PLATELET # BLD AUTO: 294 X10E3/UL (ref 150–450)
POTASSIUM SERPL-SCNC: 4.5 MMOL/L (ref 3.5–5.2)
PROT SERPL-MCNC: 7.1 G/DL (ref 6–8.5)
RBC # BLD AUTO: 4.36 X10E6/UL (ref 3.77–5.28)
SODIUM SERPL-SCNC: 142 MMOL/L (ref 134–144)
T4 FREE SERPL-MCNC: 1.23 NG/DL (ref 0.82–1.77)
TRIGL SERPL-MCNC: 180 MG/DL (ref 0–149)
TSH SERPL DL<=0.005 MIU/L-ACNC: 1.22 UIU/ML (ref 0.45–4.5)
VIT B12 SERPL-MCNC: 760 PG/ML (ref 232–1245)
VLDLC SERPL CALC-MCNC: 33 MG/DL (ref 5–40)
WBC # BLD AUTO: 3.7 X10E3/UL (ref 3.4–10.8)

## 2025-05-01 DIAGNOSIS — E53.8 B12 DEFICIENCY: ICD-10-CM

## 2025-05-01 DIAGNOSIS — F41.9 ANXIETY: ICD-10-CM

## 2025-05-03 RX ORDER — LANOLIN ALCOHOL/MO/W.PET/CERES
1000 CREAM (GRAM) TOPICAL EVERY OTHER DAY
Qty: 45 TABLET | Refills: 3 | Status: SHIPPED | OUTPATIENT
Start: 2025-05-03

## 2025-05-03 RX ORDER — CLONAZEPAM 0.5 MG/1
0.5 TABLET ORAL 2 TIMES DAILY PRN
Qty: 56 TABLET | Refills: 5 | Status: SHIPPED | OUTPATIENT
Start: 2025-05-03

## 2025-06-12 DIAGNOSIS — E55.9 VITAMIN D DEFICIENCY: ICD-10-CM

## 2025-06-13 DIAGNOSIS — K21.9 GASTROESOPHAGEAL REFLUX DISEASE, UNSPECIFIED WHETHER ESOPHAGITIS PRESENT: ICD-10-CM

## 2025-06-13 RX ORDER — ERGOCALCIFEROL 1.25 MG/1
50000 CAPSULE, LIQUID FILLED ORAL 2 TIMES WEEKLY
Qty: 8 CAPSULE | Refills: 0 | Status: SHIPPED | OUTPATIENT
Start: 2025-06-16

## 2025-06-13 RX ORDER — OMEPRAZOLE 40 MG/1
40 CAPSULE, DELAYED RELEASE ORAL DAILY
Qty: 30 CAPSULE | Refills: 5 | Status: SHIPPED | OUTPATIENT
Start: 2025-06-13